# Patient Record
Sex: FEMALE | Race: WHITE | NOT HISPANIC OR LATINO | Employment: FULL TIME | URBAN - METROPOLITAN AREA
[De-identification: names, ages, dates, MRNs, and addresses within clinical notes are randomized per-mention and may not be internally consistent; named-entity substitution may affect disease eponyms.]

---

## 2017-08-25 ENCOUNTER — APPOINTMENT (OUTPATIENT)
Dept: RADIOLOGY | Facility: MEDICAL CENTER | Age: 37
End: 2017-08-25
Attending: EMERGENCY MEDICINE
Payer: MEDICAID

## 2017-08-25 ENCOUNTER — HOSPITAL ENCOUNTER (EMERGENCY)
Facility: MEDICAL CENTER | Age: 37
End: 2017-08-26
Attending: EMERGENCY MEDICINE
Payer: MEDICAID

## 2017-08-25 VITALS
TEMPERATURE: 97.4 F | DIASTOLIC BLOOD PRESSURE: 73 MMHG | HEIGHT: 64 IN | SYSTOLIC BLOOD PRESSURE: 119 MMHG | HEART RATE: 71 BPM | BODY MASS INDEX: 35 KG/M2 | OXYGEN SATURATION: 96 % | RESPIRATION RATE: 16 BRPM | WEIGHT: 205.03 LBS

## 2017-08-25 DIAGNOSIS — R10.13 EPIGASTRIC PAIN: ICD-10-CM

## 2017-08-25 LAB
ALBUMIN SERPL BCP-MCNC: 3.9 G/DL (ref 3.2–4.9)
ALBUMIN/GLOB SERPL: 1.3 G/DL
ALP SERPL-CCNC: 70 U/L (ref 30–99)
ALT SERPL-CCNC: 39 U/L (ref 2–50)
ANION GAP SERPL CALC-SCNC: 6 MMOL/L (ref 0–11.9)
AST SERPL-CCNC: 35 U/L (ref 12–45)
BASOPHILS # BLD AUTO: 0.2 % (ref 0–1.8)
BASOPHILS # BLD: 0.02 K/UL (ref 0–0.12)
BILIRUB SERPL-MCNC: 0.8 MG/DL (ref 0.1–1.5)
BUN SERPL-MCNC: 10 MG/DL (ref 8–22)
CALCIUM SERPL-MCNC: 9 MG/DL (ref 8.4–10.2)
CHLORIDE SERPL-SCNC: 103 MMOL/L (ref 96–112)
CO2 SERPL-SCNC: 26 MMOL/L (ref 20–33)
CREAT SERPL-MCNC: 0.67 MG/DL (ref 0.5–1.4)
EOSINOPHIL # BLD AUTO: 0.18 K/UL (ref 0–0.51)
EOSINOPHIL NFR BLD: 1.7 % (ref 0–6.9)
ERYTHROCYTE [DISTWIDTH] IN BLOOD BY AUTOMATED COUNT: 43.7 FL (ref 35.9–50)
GFR SERPL CREATININE-BSD FRML MDRD: >60 ML/MIN/1.73 M 2
GLOBULIN SER CALC-MCNC: 3.1 G/DL (ref 1.9–3.5)
GLUCOSE SERPL-MCNC: 87 MG/DL (ref 65–99)
HCT VFR BLD AUTO: 39.7 % (ref 37–47)
HGB BLD-MCNC: 13.3 G/DL (ref 12–16)
IMM GRANULOCYTES # BLD AUTO: 0.06 K/UL (ref 0–0.11)
IMM GRANULOCYTES NFR BLD AUTO: 0.6 % (ref 0–0.9)
LIPASE SERPL-CCNC: 29 U/L (ref 7–58)
LYMPHOCYTES # BLD AUTO: 3.6 K/UL (ref 1–4.8)
LYMPHOCYTES NFR BLD: 33.2 % (ref 22–41)
MCH RBC QN AUTO: 29.5 PG (ref 27–33)
MCHC RBC AUTO-ENTMCNC: 33.5 G/DL (ref 33.6–35)
MCV RBC AUTO: 88 FL (ref 81.4–97.8)
MONOCYTES # BLD AUTO: 0.87 K/UL (ref 0–0.85)
MONOCYTES NFR BLD AUTO: 8 % (ref 0–13.4)
NEUTROPHILS # BLD AUTO: 6.12 K/UL (ref 2–7.15)
NEUTROPHILS NFR BLD: 56.3 % (ref 44–72)
NRBC # BLD AUTO: 0 K/UL
NRBC BLD AUTO-RTO: 0 /100 WBC
PLATELET # BLD AUTO: 208 K/UL (ref 164–446)
PMV BLD AUTO: 10.4 FL (ref 9–12.9)
POTASSIUM SERPL-SCNC: 3.6 MMOL/L (ref 3.6–5.5)
PROT SERPL-MCNC: 7 G/DL (ref 6–8.2)
RBC # BLD AUTO: 4.51 M/UL (ref 4.2–5.4)
SODIUM SERPL-SCNC: 135 MMOL/L (ref 135–145)
WBC # BLD AUTO: 10.9 K/UL (ref 4.8–10.8)

## 2017-08-25 PROCEDURE — 85025 COMPLETE CBC W/AUTO DIFF WBC: CPT

## 2017-08-25 PROCEDURE — 99283 EMERGENCY DEPT VISIT LOW MDM: CPT

## 2017-08-25 PROCEDURE — 83690 ASSAY OF LIPASE: CPT

## 2017-08-25 PROCEDURE — 80053 COMPREHEN METABOLIC PANEL: CPT

## 2017-08-25 PROCEDURE — 81025 URINE PREGNANCY TEST: CPT

## 2017-08-25 PROCEDURE — 36415 COLL VENOUS BLD VENIPUNCTURE: CPT

## 2017-08-25 NOTE — ED AVS SNAPSHOT
Tomorrow Access Code: [email or web address]  Expires: 9/25/2017 12:16 AM    Your email address is not on file at [facility].  Email Addresses are required for you to sign up for Tomorrow, please contact [phone] to verify your personal information and to provide your email address prior to attempting to register for Tomorrow.    Terri Alvares  [address]   [place] [address]    Tomorrow  A secure, online tool to manage your health information     [facility]s Tomorrow® is a secure, online tool that connects you to your personalized health information from the privacy of your home -- day or night - making it very easy for you to manage your healthcare. Once the activation process is completed, you can even access your medical information using the Tomorrow concha, which is available for free in the Apple Concha store or Google Play store.     To learn more about Tomorrow, visit [email or web address]    There are two levels of access available (as shown below):   My Chart Features  [facility] Primary Care Doctor [facility]  Specialists [facility]  Urgent  Care Non-[facility] Primary Care Doctor   Email your healthcare team securely and privately 24/7 X X X    Manage appointments: schedule your next appointment; view details of past/upcoming appointments X      Request prescription refills. X      View recent personal medical records, including lab and immunizations X X X X   View health record, including health history, allergies, medications X X X X   Read reports about your outpatient visits, procedures, consult and ER notes X X X X   See your discharge summary, which is a recap of your hospital and/or ER visit that includes your diagnosis, lab results, and care plan X X  X     How to register for [employer]Me[employer]:  Once your e-mail address has been verified, follow the following steps to sign up for Tomorrow.     1. Go to  [email or web address]  2. Click on the Sign Up Now box, which takes you to the New Member Sign Up page. You will need  to provide the following information:  a. Enter your mySBX Access Code exactly as it appears at the top of this page. (You will not need to use this code after you’ve completed the sign-up process. If you do not sign up before the expiration date, you must request a new code.)   b. Enter your date of birth.   c. Enter your home email address.   d. Click Submit, and follow the next screen’s instructions.  3. Create a mySBX ID. This will be your mySBX login ID and cannot be changed, so think of one that is secure and easy to remember.  4. Create a mySBX password. You can change your password at any time.  5. Enter your Password Reset Question and Answer. This can be used at a later time if you forget your password.   6. Enter your e-mail address. This allows you to receive e-mail notifications when new information is available in mySBX.  7. Click Sign Up. You can now view your health information.    For assistance activating your mySBX account, call [phone]

## 2017-08-25 NOTE — ED AVS SNAPSHOT
Home Care Instructions                                                                                                                Terri Alvares   MRN: [id number]    Department:  [facility], Emergency Dept   Date of Visit:  8/25/2017            [facility], Emergency Dept    [address] [place] [address]    Phone:  [phone]      You were seen by     Edwin Qiu M.D.      Your Diagnosis Was     Epigastric pain     R10.13       Follow-up Information     1. Follow up with Missy Eckert M.D. In 1 week.    Specialty:  Family Medicine    Contact information    [address]   [place] [address] [phone]        Medication Information     Review all of your home medications and newly ordered medications with your primary doctor and/or pharmacist as soon as possible. Follow medication instructions as directed by your doctor and/or pharmacist.     Please keep your complete medication list with you and share with your physician. Update the information when medications are discontinued, doses are changed, or new medications (including over-the-counter products) are added; and carry medication information at all times in the event of emergency situations.               Medication List      ASK your doctor about these medications        Instructions    Morning Afternoon Evening Bedtime    acyclovir 400 MG tablet   Commonly known as:  ZOVIRAX        Take 1 Tab by mouth 2 times a day.   Dose:  400 mg                        ibuprofen 800 MG Tabs   Commonly known as:  MOTRIN        Take 1 Tab by mouth every 8 hours as needed (Cramping).   Dose:  800 mg                        oxycodone-acetaminophen 5-325 MG Tabs   Commonly known as:  PERCOCET        Take 2 Tabs by mouth every four hours as needed for Severe Pain ((Pain Scale 7-10)).   Dose:  2 Tab                        PRENATAL VIT-FE BISG-FA-DHA PO        Take  by mouth.                                   Procedures and tests performed during your visit     CBC WITH DIFFERENTIAL    COMP METABOLIC PANEL    CONSENT FOR CONTRAST INJECTION    CT-ABDOMEN-PELVIS WITH    ESTIMATED GFR    LIPASE        Discharge Instructions       Your CT shows no problems with your left and/or other surgical process. Please follow-up with your doctor as below, seek immediate medical attention for any vomiting, fevers, new or worsening pain or any other concerns    Abdominal Pain (Nonspecific)  Your exam might not show the exact reason you have abdominal pain. Since there are many different causes of abdominal pain, another checkup and more tests may be needed. It is very important to follow up for lasting (persistent) or worsening symptoms. A possible cause of abdominal pain in any person who still has his or her appendix is acute appendicitis. Appendicitis is often hard to diagnose. Normal blood tests, urine tests, ultrasound, and CT scans do not completely rule out early appendicitis or other causes of abdominal pain. Sometimes, only the changes that happen over time will allow appendicitis and other causes of abdominal pain to be determined. Other potential problems that may require surgery may also take time to become more apparent. Because of this, it is important that you follow all of the instructions below.  HOME CARE INSTRUCTIONS   · Rest as much as possible.   · Do not eat solid food until your pain is gone.   · While adults or children have pain: A diet of water, weak decaffeinated tea, broth or bouillon, gelatin, oral rehydration solutions (ORS), frozen ice pops, or ice chips may be helpful.   · When pain is gone in adults or children: Start a light diet (dry toast, crackers, applesauce, or white rice). Increase the diet slowly as long as it does not bother you. Eat no dairy products (including cheese and eggs) and no spicy, fatty, fried, or high-fiber foods.   · Use no alcohol, caffeine, or cigarettes.   · Take your regular  medicines unless your caregiver told you not to.   · Take any prescribed medicine as directed.   · Only take over-the-counter or prescription medicines for pain, discomfort, or fever as directed by your caregiver. Do not give aspirin to children.   If your caregiver has given you a follow-up appointment, it is very important to keep that appointment. Not keeping the appointment could result in a permanent injury and/or lasting (chronic) pain and/or disability. If there is any problem keeping the appointment, you must call to reschedule.   SEEK IMMEDIATE MEDICAL CARE IF:   · Your pain is not gone in 24 hours.   · Your pain becomes worse, changes location, or feels different.   · You or your child has an oral temperature above 102° F (38.9° C), not controlled by medicine.   · Your baby is older than 3 months with a rectal temperature of 102° F (38.9° C) or higher.   · Your baby is 3 months old or younger with a rectal temperature of 100.4° F (38° C) or higher.   · You have shaking chills.   · You keep throwing up (vomiting) or cannot drink liquids.   · There is blood in your vomit or you see blood in your bowel movements.   · Your bowel movements become dark or black.   · You have frequent bowel movements.   · Your bowel movements stop (become blocked) or you cannot pass gas.   · You have bloody, frequent, or painful urination.   · You have yellow discoloration in the skin or whites of the eyes.   · Your stomach becomes bloated or bigger.   · You have dizziness or fainting.   · You have chest or back pain.   MAKE SURE YOU:   · Understand these instructions.   · Will watch your condition.   · Will get help right away if you are not doing well or get worse.   Document Released: 12/18/2006 Document Revised: 03/11/2013 Document Reviewed: 11/15/2010  ExitCare® Patient Information ©2013 [employer].          Patient Information     Patient Information    Following emergency treatment: all patient requiring follow-up care  must return either to a private physician or a clinic if your condition worsens before you are able to obtain further medical attention, please return to the emergency room.     Billing Information    At [facility], we work to make the billing process streamlined for our patients.  Our Representatives are here to answer any questions you may have regarding your hospital bill.  If you have insurance coverage and have supplied your insurance information to us, we will submit a claim to your insurer on your behalf.  Should you have any questions regarding your bill, we can be reached online or by phone as follows:  Online: You are able pay your bills online or live chat with our representatives about any billing questions you may have. We are here to help Monday - Friday from 8:00am to 7:30pm and 9:00am - 12:00pm on Saturdays.  Please visit [email or web address]  for more information.   Phone:  [phone] or [phone]    Please note that your emergency physician, surgeon, pathologist, radiologist, anesthesiologist, and other specialists are not employed by [facility] and will therefore bill separately for their services.  Please contact them directly for any questions concerning their bills at the numbers below:     Emergency Physician Services:  [phone]  [facility]:  [phone]  [facility]:  [phone]  [facility]:  [phone]    1. Your final bill may vary from the amount quoted upon discharge if all procedures are not complete at that time, or if your doctor has additional procedures of which we are not aware. You will receive an additional bill if you return to the Emergency Department at [facility] for suture removal regardless of the facility of which the sutures were placed.     2. Please arrange for settlement of this account at the emergency registration.    3. All self-pay accounts are due in full at the  time of treatment.  If you are unable to meet this obligation then payment is expected within 4-5 days.     4. If you have had radiology studies (CT, X-ray, Ultrasound, MRI), you have received a preliminary result during your emergency department visit. Please contact the radiology department [phone] to receive a copy of your final result. Please discuss the Final result with your primary physician or with the follow up physician provided.     Crisis Hotline:  [facility] Hotline:  7-139-LRDSXTX or 1-584.357.9145  [place] Crisis Hotline:    1-590.516.3574 or 381-078-8617         ED Discharge Follow Up Questions    1. In order to provide you with very good care, we would like to follow up with a phone call in the next few days.  May we have your permission to contact you?     YES /  NO    2. What is the best phone number to call you? (       )_____-__________    3. What is the best time to call you?      Morning  /  Afternoon  /  Evening                   Patient Signature:  ____________________________________________________________    Date:  ____________________________________________________________

## 2017-08-25 NOTE — ED AVS SNAPSHOT
8/26/2017    Terri Alvares  [address]   [place] [address]    Dear Terri:    [facility] wants to ensure your discharge home is safe and you or your loved ones have had all of your questions answered regarding your care after you leave the hospital.    Below is a list of resources and contact information should you have any questions regarding your hospital stay, follow-up instructions, or active medical symptoms.    Questions or Concerns Regarding… Contact   Medical Questions Related to Your Discharge  (7 days a week, 8am-5pm) Contact a Nurse Care Coordinator   [phone]   Medical Questions Not Related to Your Discharge  (24 hours a day / 7 days a week)  Contact the Nurse Health Line   [phone]    Medications or Discharge Instructions Refer to your discharge packet   or contact your [facility] Primary Care Provider   [phone]   Follow-up Appointment(s) Schedule your appointment via [employer]   or contact Scheduling [phone]   Billing Review your statement via [employer]  or contact Billing [phone]   Medical Records Review your records via [employer]   or contact Medical Records [phone]     You may receive a telephone call within two days of discharge. This call is to make certain you understand your discharge instructions and have the opportunity to have any questions answered. You can also easily access your medical information, test results and upcoming appointments via the MagForce free online health management tool. You can learn more and sign up at [email or web address]/MagForce. For assistance setting up your MagForce account, please call [phone].    Once again, we want to ensure your discharge home is safe and that you have a clear understanding of any next steps in your care. If you have any questions or concerns, please do not hesitate to contact us, we are here for you. Thank you for choosing [facility] for your healthcare needs.    Sincerely,    Your [facility] Healthcare Team

## 2017-08-26 LAB — HCG UR QL: NEGATIVE

## 2017-08-26 PROCEDURE — 700117 HCHG RX CONTRAST REV CODE 255: Performed by: EMERGENCY MEDICINE

## 2017-08-26 PROCEDURE — 74177 CT ABD & PELVIS W/CONTRAST: CPT

## 2017-08-26 RX ADMIN — IOHEXOL 100 ML: 350 INJECTION, SOLUTION INTRAVENOUS at 00:20

## 2017-08-26 NOTE — ED PROVIDER NOTES
ED Provider Note    ER PROVIDER NOTE    Scribed for Edwin Qiu M.D.  by Edwin Qiu. 8/25/2017 at 11:03 PM.    Primary Care Provider: Missy Eckert M.D.  Means of Arrival: pt  History obtained from: pt   History limited by: none     CHIEF COMPLAINT  Chief Complaint   Patient presents with   • Abdominal Pain       HPI  Terri Alvares is a 37 y.o. female who presents to the emergency department complaining ofUpper abdominal pain. Patient had lap band 4 years ago and has been doing well. She had some felt sick and outs around 1 month ago and has had some intermittent pain since then. She denies any nausea or vomiting. Pain just seems more random and crampy in nature to her without alleviating or aggravating factors. She's had some constipation that is still having bowel movements. No blood. No fevers or chills. No urinary symptoms. No chest pain or other symptoms    REVIEW OF SYSTEMS  Pertinent positives include abdominal pain. Pertinent negatives include no vomiting. See HPI for details. All other systems reviewed and are negative.    PAST MEDICAL HISTORY   has a past medical history of Allergy; Depression; GDM (gestational diabetes mellitus) (7/3/2013); Psychiatric disorder; and Suicidal attempted.    SURGICAL HISTORY   has a past surgical history that includes eye surgery (2011) and gastric banding laparoscopic (12/12).    FAMILY HISTORY  Family History   Problem Relation Age of Onset   • Diabetes Mother      dialysis   • Diabetes Brother    • Diabetes Maternal Aunt    • Diabetes Maternal Grandmother        SOCIAL HISTORY  Social History     Social History   • Marital status: Single     Spouse name: N/A   • Number of children: N/A   • Years of education: N/A     Social History Main Topics   • Smoking status: Former Smoker     Types: Cigarettes     Quit date: 11/12/2013   • Smokeless tobacco: Not on file      Comment: last used 3/13 due to pregnancy   • Alcohol use No      Comment: occ    • Drug use:  "No   • Sexual activity: Not Currently     Partners: Male      Comment: unplanned pregnancy FOB not involved     Other Topics Concern   • Not on file     Social History Narrative   • No narrative on file      History   Drug Use No       CURRENT MEDICATIONS  Home Medications    **Home medications have not yet been reviewed for this encounter**         ALLERGIES  No Known Allergies    PHYSICAL EXAM  VITAL SIGNS: /73   Pulse 71   Temp 36.3 °C (97.4 °F)   Resp 16   Ht 1.626 m (5' 4.02\")   Wt 93 kg (205 lb 0.4 oz)   LMP 07/01/2015 Comment: Irregular periods, +UPT in Aug, no bleeding since  SpO2 96%   BMI 35.18 kg/m²   Pulse ox interpretation: I interpret this pulse ox as normal.    Constitutional: Alert in no apparent distress.  HENT: No signs of trauma, Bilateral external ears normal, Nose normal.   Eyes: Pupils are equal and reactive, Conjunctiva normal, Non-icteric.   Neck: Normal range of motion, No tenderness, Supple, No stridor.   Lymphatic: No lymphadenopathy noted.   Cardiovascular: Regular rate and rhythm, no murmurs.   Thorax & Lungs: Normal breath sounds, No respiratory distress, No wheezing, No chest tenderness.   Abdomen: Bowel sounds normal, Soft, No tenderness, No masses, No pulsatile masses. No peritoneal signs.  Skin: Warm, Dry, No erythema, No rash.   Back: No bony tenderness, No CVA tenderness.   Extremities: Intact distal pulses, No edema, No tenderness, No cyanosis, Negative Alla's sign.  Musculoskeletal: Good range of motion in all major joints. No tenderness to palpation or major deformities noted.   Neurologic: Alert , Normal motor function, Normal sensory function, No focal deficits noted.   Psychiatric: Affect normal, Judgment normal, Mood normal.     DIAGNOSTIC STUDIES / PROCEDURES        LABS  Results for orders placed or performed during the hospital encounter of 08/25/17   CBC WITH DIFFERENTIAL   Result Value Ref Range    WBC 10.9 (H) 4.8 - 10.8 K/uL    RBC 4.51 4.20 - 5.40 " M/uL    Hemoglobin 13.3 12.0 - 16.0 g/dL    Hematocrit 39.7 37.0 - 47.0 %    MCV 88.0 81.4 - 97.8 fL    MCH 29.5 27.0 - 33.0 pg    MCHC 33.5 (L) 33.6 - 35.0 g/dL    RDW 43.7 35.9 - 50.0 fL    Platelet Count 208 164 - 446 K/uL    MPV 10.4 9.0 - 12.9 fL    Neutrophils-Polys 56.30 44.00 - 72.00 %    Lymphocytes 33.20 22.00 - 41.00 %    Monocytes 8.00 0.00 - 13.40 %    Eosinophils 1.70 0.00 - 6.90 %    Basophils 0.20 0.00 - 1.80 %    Immature Granulocytes 0.60 0.00 - 0.90 %    Nucleated RBC 0.00 /100 WBC    Neutrophils (Absolute) 6.12 2.00 - 7.15 K/uL    Lymphs (Absolute) 3.60 1.00 - 4.80 K/uL    Monos (Absolute) 0.87 (H) 0.00 - 0.85 K/uL    Eos (Absolute) 0.18 0.00 - 0.51 K/uL    Baso (Absolute) 0.02 0.00 - 0.12 K/uL    Immature Granulocytes (abs) 0.06 0.00 - 0.11 K/uL    NRBC (Absolute) 0.00 K/uL   COMP METABOLIC PANEL   Result Value Ref Range    Sodium 135 135 - 145 mmol/L    Potassium 3.6 3.6 - 5.5 mmol/L    Chloride 103 96 - 112 mmol/L    Co2 26 20 - 33 mmol/L    Anion Gap 6.0 0.0 - 11.9    Glucose 87 65 - 99 mg/dL    Bun 10 8 - 22 mg/dL    Creatinine 0.67 0.50 - 1.40 mg/dL    Calcium 9.0 8.4 - 10.2 mg/dL    AST(SGOT) 35 12 - 45 U/L    ALT(SGPT) 39 2 - 50 U/L    Alkaline Phosphatase 70 30 - 99 U/L    Total Bilirubin 0.8 0.1 - 1.5 mg/dL    Albumin 3.9 3.2 - 4.9 g/dL    Total Protein 7.0 6.0 - 8.2 g/dL    Globulin 3.1 1.9 - 3.5 g/dL    A-G Ratio 1.3 g/dL   LIPASE   Result Value Ref Range    Lipase 29 7 - 58 U/L   ESTIMATED GFR   Result Value Ref Range    GFR If African American >60 >60 mL/min/1.73 m 2    GFR If Non African American >60 >60 mL/min/1.73 m 2   POC URINE PREGNANCY   Result Value Ref Range    POC Urine Pregnancy Test Negative        All labs reviewed by me.    RADIOLOGY  CT-ABDOMEN-PELVIS WITH   Final Result         1.  No acute abnormality.   2.  Hepatomegaly and diffuse hepatic steatosis.   3.  Splenic cyst        The radiologist's interpretation of all radiological studies have been reviewed by  me.    COURSE & MEDICAL DECISION MAKING  Nursing notes, VS, PMSFHx reviewed in chart.    11:03 PM Patient seen and examined at bedside.Ordered for  CT, blood work to evaluate her symptoms.     12:17 AM  Evaluated, patient states no pain at this time, abdomen soft and nontender    Patient reevaluated still symptom free. Updated on result and plan for discharge    Decision Making:  This is a 37 y.o. female presenting with intermittent abdominal pain. At this time no surgical process is detected. The nature of her symptoms and diagnostics obtained here today do not suggest biliary pathology, or pancreatitis. This may be more gastric in nature. There does not appear to be any complication, emergently, from her lap band without evidence of obstruction, internal hernia or perforation. Patient will follow up with her surgeon for reevaluation and is well-appearing, pain-free and tolerating by mouth well at the time of discharge     The patient will return for new or worsening symptoms and is stable at the time of discharge.    The patient is referred to a primary physician for blood pressure management, diabetic screening, and for all other preventative health concerns.    DISPOSITION:  Patient will be discharged home in stable condition.    FOLLOW UP:  Missy Eckert M.D.  [address] [facility] [address]  [phone]    In 1 week        OUTPATIENT MEDICATIONS:  Discharge Medication List as of 8/26/2017 12:16 AM              FINAL IMPRESSION  1. Epigastric pain         The note accurately reflects work and decisions made by me.  Edwin Qiu  8/26/2017  4:57 AM

## 2017-08-26 NOTE — DISCHARGE INSTRUCTIONS
Your CT shows no problems with your left and/or other surgical process. Please follow-up with your doctor as below, seek immediate medical attention for any vomiting, fevers, new or worsening pain or any other concerns    Abdominal Pain (Nonspecific)  Your exam might not show the exact reason you have abdominal pain. Since there are many different causes of abdominal pain, another checkup and more tests may be needed. It is very important to follow up for lasting (persistent) or worsening symptoms. A possible cause of abdominal pain in any person who still has his or her appendix is acute appendicitis. Appendicitis is often hard to diagnose. Normal blood tests, urine tests, ultrasound, and CT scans do not completely rule out early appendicitis or other causes of abdominal pain. Sometimes, only the changes that happen over time will allow appendicitis and other causes of abdominal pain to be determined. Other potential problems that may require surgery may also take time to become more apparent. Because of this, it is important that you follow all of the instructions below.  HOME CARE INSTRUCTIONS   · Rest as much as possible.   · Do not eat solid food until your pain is gone.   · While adults or children have pain: A diet of water, weak decaffeinated tea, broth or bouillon, gelatin, oral rehydration solutions (ORS), frozen ice pops, or ice chips may be helpful.   · When pain is gone in adults or children: Start a light diet (dry toast, crackers, applesauce, or white rice). Increase the diet slowly as long as it does not bother you. Eat no dairy products (including cheese and eggs) and no spicy, fatty, fried, or high-fiber foods.   · Use no alcohol, caffeine, or cigarettes.   · Take your regular medicines unless your caregiver told you not to.   · Take any prescribed medicine as directed.   · Only take over-the-counter or prescription medicines for pain, discomfort, or fever as directed by your caregiver. Do not give  aspirin to children.   If your caregiver has given you a follow-up appointment, it is very important to keep that appointment. Not keeping the appointment could result in a permanent injury and/or lasting (chronic) pain and/or disability. If there is any problem keeping the appointment, you must call to reschedule.   SEEK IMMEDIATE MEDICAL CARE IF:   · Your pain is not gone in 24 hours.   · Your pain becomes worse, changes location, or feels different.   · You or your child has an oral temperature above 102° F (38.9° C), not controlled by medicine.   · Your baby is older than 3 months with a rectal temperature of 102° F (38.9° C) or higher.   · Your baby is 3 months old or younger with a rectal temperature of 100.4° F (38° C) or higher.   · You have shaking chills.   · You keep throwing up (vomiting) or cannot drink liquids.   · There is blood in your vomit or you see blood in your bowel movements.   · Your bowel movements become dark or black.   · You have frequent bowel movements.   · Your bowel movements stop (become blocked) or you cannot pass gas.   · You have bloody, frequent, or painful urination.   · You have yellow discoloration in the skin or whites of the eyes.   · Your stomach becomes bloated or bigger.   · You have dizziness or fainting.   · You have chest or back pain.   MAKE SURE YOU:   · Understand these instructions.   · Will watch your condition.   · Will get help right away if you are not doing well or get worse.   Document Released: 12/18/2006 Document Revised: 03/11/2013 Document Reviewed: 11/15/2010  [employer]® Patient Information ©2013 [employer], "[employer].

## 2017-08-26 NOTE — ED NOTES
"C/o pain in upper abdomen. Pt states she thinks this is secondary to her lap band. Pt states she had the surgery \"a couple years ago\" and that she has had issues before. Wants an x-ray to confirm that the lap band has not moved.  "

## 2017-10-16 ENCOUNTER — NON-PROVIDER VISIT (OUTPATIENT)
Dept: OCCUPATIONAL MEDICINE | Facility: CLINIC | Age: 37
End: 2017-10-16

## 2017-10-16 DIAGNOSIS — Z02.1 PRE-EMPLOYMENT DRUG SCREENING: ICD-10-CM

## 2017-10-16 LAB
AMP AMPHETAMINE: NORMAL
COC COCAINE: NORMAL
INT CON NEG: NORMAL
INT CON POS: NORMAL
MET METHAMPHETAMINES: NORMAL
OPI OPIATES: NORMAL
PCP PHENCYCLIDINE: NORMAL
POC DRUG COMMENT 753798-OCCUPATIONAL HEALTH: NORMAL
THC: NORMAL

## 2017-10-16 PROCEDURE — 80305 DRUG TEST PRSMV DIR OPT OBS: CPT | Performed by: PREVENTIVE MEDICINE

## 2018-05-02 ENCOUNTER — INITIAL PRENATAL (OUTPATIENT)
Dept: OBGYN | Facility: CLINIC | Age: 38
End: 2018-05-02
Payer: MEDICAID

## 2018-05-02 VITALS — WEIGHT: 207 LBS | BODY MASS INDEX: 35.51 KG/M2 | DIASTOLIC BLOOD PRESSURE: 72 MMHG | SYSTOLIC BLOOD PRESSURE: 108 MMHG

## 2018-05-02 DIAGNOSIS — N93.8 DUB (DYSFUNCTIONAL UTERINE BLEEDING): ICD-10-CM

## 2018-05-02 LAB
INT CON NEG: NEGATIVE
INT CON POS: POSITIVE
POC URINE PREGNANCY TEST: POSITIVE

## 2018-05-02 PROCEDURE — 81025 URINE PREGNANCY TEST: CPT | Performed by: OBSTETRICS & GYNECOLOGY

## 2018-05-02 PROCEDURE — 99213 OFFICE O/P EST LOW 20 MIN: CPT | Mod: 25 | Performed by: OBSTETRICS & GYNECOLOGY

## 2018-05-02 PROCEDURE — 76830 TRANSVAGINAL US NON-OB: CPT | Performed by: OBSTETRICS & GYNECOLOGY

## 2018-05-02 NOTE — PROGRESS NOTES
Patient here for a   LMP: beginning of February 2018  PNV patient started taking vitamins when she found out she was pregnant   Phone #:[phone]  Pharmacy Confirmed w/Pt.  C/O: Yeast infection, tried OTC, but states this has not resolved.

## 2018-05-02 NOTE — PROGRESS NOTES
Chief complaint;  This patient is a 37 y.o. female , No LMP recorded. Patient is pregnant. presents complaining of dysfunctional uterine bleeding.    Review of systems-denies; chest pain, shortness of breath, fever, chills, abdominal pain  Past OB history-  OB History    Para Term  AB Living   4 2 2   1 2   SAB TAB Ectopic Molar Multiple Live Births     0     0 2      # Outcome Date GA Lbr Frank/2nd Weight Sex Delivery Anes PTL Lv   4 Current            3 Term 04/10/16 41w0d  2.722 kg (6 lb) F Vag-Spont EPI N ROWAN      Birth Comments: 1 day procedure   2 Term 13 39w0d  3.135 kg (6 lb 14.6 oz) F VAGINAL TREY  N ROWAN   1 AB 98              Birth Comments: System Generated. Please review and update pregnancy details.        Past surgical history-   Past Surgical History:   Procedure Laterality Date   • GASTRIC BANDING LAPAROSCOPIC      Lost 40#   • EYE SURGERY       Past medical history-   Past Medical History:   Diagnosis Date   • Allergy     seasonal   • Depression    • GDM (gestational diabetes mellitus) 7/3/2013    denies, states treated r/t previous gastric surgery. NOT current diabetic   • Psychiatric disorder    • Suicidal attempted      Allergies- Patient has no known allergies.  Present medications-   Outpatient Encounter Prescriptions as of 2018   Medication Sig Dispense Refill   • PRENATAL VIT-FE BISG-FA-DHA PO Take  by mouth.     • oxycodone-acetaminophen (PERCOCET) 5-325 MG Tab Take 2 Tabs by mouth every four hours as needed for Severe Pain ((Pain Scale 7-10)). (Patient not taking: Reported on 2018) 15 Tab 0   • ibuprofen (MOTRIN) 800 MG Tab Take 1 Tab by mouth every 8 hours as needed (Cramping). (Patient not taking: Reported on 2018) 30 Tab 3   • acyclovir (ZOVIRAX) 400 MG tablet Take 1 Tab by mouth 2 times a day. (Patient not taking: Reported on 2018) 60 Tab 6     No facility-administered encounter medications on file as of 2018.      Family  history- no history of breast or ovarian cancer  Social history-   Social History     Social History   • Marital status: Single     Spouse name: N/A   • Number of children: N/A   • Years of education: N/A     Occupational History   • Not on file.     Social History Main Topics   • Smoking status: Former Smoker     Types: Cigarettes     Quit date: 11/12/2013   • Smokeless tobacco: Never Used      Comment: last used 3/13 due to pregnancy   • Alcohol use No      Comment: occ    • Drug use: No   • Sexual activity: Yes     Partners: Male     Other Topics Concern   • Not on file     Social History Narrative   • No narrative on file         Physical examination;   Alert and oriented x3  General-well-developed well-nourished female in no apparent distress  Vitals:    05/02/18 1322   BP: 108/72   Weight: 93.9 kg (207 lb)     Skin is warm and dry   HEENT-normocephalic,nontraumatic,PERRLA,EOMI  Throat- no edema or erythema  Neck-supple  Cardiovascular-regular rate and rhythm, normal S1-S2 without murmurs or gallops  Lungs-clear to auscultation bilaterally  Back-negative CVA tenderness  Abdomen-nondistended positive bowel sounds soft nontender without masses or hepatosplenomegaly  Pelvic examination;  External genitalia-without lesions  Vagina-no blood, no discharge  Cervix-closed,no gross lesions  Uterus-  12 weeks size, nontender  Adnexa- without mass or tenderness  Extremities-without cyanosis clubbing or edema  Neurologic-grossly intact    Transvaginal ultrasound; as performed and read by myself; intrauterine gestational sac containing small pregnancy positive fetal cardiac motion crown-rump length consistent with 12 weeks 0 days, EDC 11/14/18 no free pelvic fluid, no adnexal masses    Impression;  Dysfunctional uterine bleeding-no evidence of ectopic or miscarriage    Plan;     Patient to followup 2 weeks for initial OB

## 2018-06-05 ENCOUNTER — HOSPITAL ENCOUNTER (EMERGENCY)
Facility: MEDICAL CENTER | Age: 38
End: 2018-06-06
Attending: EMERGENCY MEDICINE
Payer: MEDICAID

## 2018-06-05 ENCOUNTER — APPOINTMENT (OUTPATIENT)
Dept: RADIOLOGY | Facility: MEDICAL CENTER | Age: 38
End: 2018-06-05
Attending: EMERGENCY MEDICINE
Payer: MEDICAID

## 2018-06-05 DIAGNOSIS — R10.9 ABDOMINAL PAIN DURING PREGNANCY IN SECOND TRIMESTER: ICD-10-CM

## 2018-06-05 DIAGNOSIS — O26.892 ABDOMINAL PAIN DURING PREGNANCY IN SECOND TRIMESTER: ICD-10-CM

## 2018-06-05 LAB
ALBUMIN SERPL BCP-MCNC: 3.6 G/DL (ref 3.2–4.9)
ALBUMIN/GLOB SERPL: 1.1 G/DL
ALP SERPL-CCNC: 46 U/L (ref 30–99)
ALT SERPL-CCNC: 41 U/L (ref 2–50)
ANION GAP SERPL CALC-SCNC: 9 MMOL/L (ref 0–11.9)
AST SERPL-CCNC: 52 U/L (ref 12–45)
BASOPHILS # BLD AUTO: 0.1 % (ref 0–1.8)
BASOPHILS # BLD: 0.01 K/UL (ref 0–0.12)
BILIRUB SERPL-MCNC: 0.4 MG/DL (ref 0.1–1.5)
BUN SERPL-MCNC: 8 MG/DL (ref 8–22)
CALCIUM SERPL-MCNC: 8.9 MG/DL (ref 8.4–10.2)
CHLORIDE SERPL-SCNC: 108 MMOL/L (ref 96–112)
CO2 SERPL-SCNC: 19 MMOL/L (ref 20–33)
CREAT SERPL-MCNC: 0.59 MG/DL (ref 0.5–1.4)
EOSINOPHIL # BLD AUTO: 0.14 K/UL (ref 0–0.51)
EOSINOPHIL NFR BLD: 1.4 % (ref 0–6.9)
ERYTHROCYTE [DISTWIDTH] IN BLOOD BY AUTOMATED COUNT: 46.4 FL (ref 35.9–50)
GLOBULIN SER CALC-MCNC: 3.3 G/DL (ref 1.9–3.5)
GLUCOSE SERPL-MCNC: 106 MG/DL (ref 65–99)
HCT VFR BLD AUTO: 37.4 % (ref 37–47)
HGB BLD-MCNC: 12.4 G/DL (ref 12–16)
IMM GRANULOCYTES # BLD AUTO: 0.14 K/UL (ref 0–0.11)
IMM GRANULOCYTES NFR BLD AUTO: 1.4 % (ref 0–0.9)
LIPASE SERPL-CCNC: 25 U/L (ref 7–58)
LYMPHOCYTES # BLD AUTO: 2.49 K/UL (ref 1–4.8)
LYMPHOCYTES NFR BLD: 25.6 % (ref 22–41)
MCH RBC QN AUTO: 30.5 PG (ref 27–33)
MCHC RBC AUTO-ENTMCNC: 33.2 G/DL (ref 33.6–35)
MCV RBC AUTO: 91.9 FL (ref 81.4–97.8)
MONOCYTES # BLD AUTO: 0.54 K/UL (ref 0–0.85)
MONOCYTES NFR BLD AUTO: 5.5 % (ref 0–13.4)
NEUTROPHILS # BLD AUTO: 6.42 K/UL (ref 2–7.15)
NEUTROPHILS NFR BLD: 66 % (ref 44–72)
NRBC # BLD AUTO: 0 K/UL
NRBC BLD-RTO: 0 /100 WBC
PLATELET # BLD AUTO: 179 K/UL (ref 164–446)
PMV BLD AUTO: 10.5 FL (ref 9–12.9)
POTASSIUM SERPL-SCNC: 3.1 MMOL/L (ref 3.6–5.5)
PROT SERPL-MCNC: 6.9 G/DL (ref 6–8.2)
RBC # BLD AUTO: 4.07 M/UL (ref 4.2–5.4)
SODIUM SERPL-SCNC: 136 MMOL/L (ref 135–145)
WBC # BLD AUTO: 9.7 K/UL (ref 4.8–10.8)

## 2018-06-05 PROCEDURE — 80053 COMPREHEN METABOLIC PANEL: CPT

## 2018-06-05 PROCEDURE — 76705 ECHO EXAM OF ABDOMEN: CPT

## 2018-06-05 PROCEDURE — 36415 COLL VENOUS BLD VENIPUNCTURE: CPT

## 2018-06-05 PROCEDURE — 99285 EMERGENCY DEPT VISIT HI MDM: CPT

## 2018-06-05 PROCEDURE — A9270 NON-COVERED ITEM OR SERVICE: HCPCS | Performed by: EMERGENCY MEDICINE

## 2018-06-05 PROCEDURE — 85025 COMPLETE CBC W/AUTO DIFF WBC: CPT

## 2018-06-05 PROCEDURE — 700102 HCHG RX REV CODE 250 W/ 637 OVERRIDE(OP): Performed by: EMERGENCY MEDICINE

## 2018-06-05 PROCEDURE — 83690 ASSAY OF LIPASE: CPT

## 2018-06-05 RX ORDER — POTASSIUM CHLORIDE 20 MEQ/1
40 TABLET, EXTENDED RELEASE ORAL ONCE
Status: COMPLETED | OUTPATIENT
Start: 2018-06-05 | End: 2018-06-05

## 2018-06-05 RX ADMIN — POTASSIUM CHLORIDE 40 MEQ: 1500 TABLET, EXTENDED RELEASE ORAL at 22:42

## 2018-06-05 ASSESSMENT — PAIN SCALES - GENERAL
PAINLEVEL_OUTOF10: 7
PAINLEVEL_OUTOF10: 7

## 2018-06-06 VITALS
HEART RATE: 74 BPM | SYSTOLIC BLOOD PRESSURE: 114 MMHG | RESPIRATION RATE: 18 BRPM | TEMPERATURE: 98.2 F | OXYGEN SATURATION: 97 % | WEIGHT: 212.08 LBS | HEIGHT: 64 IN | DIASTOLIC BLOOD PRESSURE: 74 MMHG | BODY MASS INDEX: 36.21 KG/M2

## 2018-06-06 NOTE — ED NOTES
ANTICOAGULATION FOLLOW-UP CLINIC VISIT    Patient Name:  Salome Saeed  Date:  2/8/2018  Contact Type:  Face to Face    SUBJECTIVE:     Patient Findings     Positives Change in diet/appetite, Unexplained INR or factor level change           OBJECTIVE    INR Protime   Date Value Ref Range Status   02/08/2018 3.8 (A) 0.86 - 1.14 Final       ASSESSMENT / PLAN  INR assessment SUPRA    Recheck INR In: 5 DAYS    INR Location Clinic      Anticoagulation Summary as of 2/8/2018     INR goal 2.0-3.0   Today's INR 3.8!   Maintenance plan 2.5 mg (5 mg x 0.5) on Mon, Wed, Fri; 5 mg (5 mg x 1) all other days   Full instructions 2/8: Hold; Otherwise 2.5 mg on Mon, Wed, Fri; 5 mg all other days   Weekly total 27.5 mg   Plan last modified Leyla Lopez (1/11/2016)   Next INR check 2/13/2018   Target end date     Indications   Long-term (current) use of anticoagulants [Z79.01] [Z79.01]  CVA (cerebral vascular accident) (Resolved) [I63.9]         Anticoagulation Episode Summary     INR check location     Preferred lab     Send INR reminders to [facility] TRIAGE    Comments             See the Encounter Report to view Anticoagulation Flowsheet and Dosing Calendar (Go to Encounters tab in chart review, and find the Anticoagulation Therapy Visit)    Dosage adjustment made based on physician directed care plan.    Rosanne Rico RN                Pt assessed, c/o mid epigastric pain. Denies N/V/D. 16 weeks pregnant. Call light within reach, will cont to monitor.

## 2018-06-06 NOTE — PROGRESS NOTES
Patient given DC instructions and verbalized understanding. Patient ambulated out of ED in good condition.

## 2018-06-06 NOTE — ED PROVIDER NOTES
ED Provider Note  Chief Complaint:   Right upper quadrant pain    HPI:  Terri Alvares is a 37 y.o. Female,  at 16 and 6/7 weeks by first trimester US (YEE 18) who presents with chief complaint of right upper quadrant pain. She has a past medical history significant for bariatric lap band procedure in  performed in [place]. She is currently followed by [facility]. Approximately 3-4 days ago she developed persistent right upper quadrant pain, she states this is in the area of her lap band. When she learned she was pregnant, she had the lap band decompressed. She has had some intermittent pain in the area of the right upper quadrant, however over the last 3-4 days it has become persistent and more severe. When she initially learned she was pregnant she discussed this pain with her provider at [facility] who recommended CT scan. She declined due to her pregnancy. She has not followed up with her obstetrician regarding this pain. Pain is exacerbated by eating, though not always brought on by eating. She describes it as intermittently sharp and dull. Pain is nonradiating.    She has not had any associated vomiting, she has had some mild nausea which she attributes to her pregnancy. She has had no chest pain, no shortness of breath, no fevers. She has no lower abdominal pain, no lower abdominal cramping, no abnormal vaginal discharge. She has not had any vaginal bleeding. She has no history of diabetes, no impaired immunity. No headaches, no neck or back pain.    Review of Systems:  See HPI for pertinent positives and negatives. All other systems negative.    Past Medical History:   has a past medical history of Allergy; Depression; GDM (gestational diabetes mellitus) (7/3/2013); Psychiatric disorder; and Suicidal attempted.    Social History:  Social History     Social History Main Topics   • Smoking status: Former Smoker     Types: Cigarettes     Quit date: 2013   • Smokeless  "tobacco: Never Used      Comment: last used 3/13 due to pregnancy   • Alcohol use No      Comment: occ    • Drug use: No   • Sexual activity: Yes     Partners: Male       Surgical History:   has a past surgical history that includes eye surgery (2011) and gastric banding laparoscopic (12/12).    Current Medications:  Home Medications    **Home medications have not yet been reviewed for this encounter**         Allergies:  No Known Allergies    Physical Exam:  Vital Signs: /79   Pulse 72   Temp 36.8 °C (98.2 °F)   Resp 18   Ht 1.626 m (5' 4\")   Wt 96.2 kg (212 lb 1.3 oz)   SpO2 99%   BMI 36.40 kg/m²   Constitutional: Alert, no acute distress  HENT: Moist mucus membranes, normal posterior pharynx, no intraoral lesions  Eyes: Pupils equal and reactive, normal conjunctiva  Neck: Supple, normal range of motion, no stridor  Cardiovascular: Extremities are warm and well perfused, no murmer appreciated, normal cardiac auscultation  Pulmonary: No respiratory distress, normal work of breathing, no accessory muscule usage, breath sounds clear and equal bilaterally  Abdomen: Soft, non-distended, mild right upper quadrant tenderness to palpation without rebound or guarding, negative Buckley sign, no peritoneal signs  Skin: Warm, dry, no rashes or lesions  Musculoskeletal: Normal range of motion in all extremities, no swelling or deformity noted  Neurologic: Alert, oriented, normal speech, normal motor function  Psychiatric: Normal and appropriate mood and affect    Labs:  Labs Reviewed   CBC WITH DIFFERENTIAL - Abnormal; Notable for the following:        Result Value    RBC 4.07 (*)     MCHC 33.2 (*)     Immature Granulocytes 1.40 (*)     Immature Granulocytes (abs) 0.14 (*)     All other components within normal limits   COMP METABOLIC PANEL - Abnormal; Notable for the following:     Potassium 3.1 (*)     Co2 19 (*)     Glucose 106 (*)     AST(SGOT) 52 (*)     All other components within normal limits   LIPASE "   ESTIMATED GFR       Radiology:  US-GALLBLADDER   Final Result         1. Echogenic heterogeneous liver parenchyma, most commonly hepatic steatosis.           Medical records reviewed for continuity of care. Patient seen and evaluated 8/25/17 for abdominal pain. History of lap band procedure done 4 years ago. White blood count elevated to 10.9. Potassium normal at 3.6.    Differential diagnosis:  Lap band complication, adhesions, cholecystitis, cholelithiasis, pancreatitis    MDM:  History and physical exam as documented above. Patient presents with right upper quadrant pain for the past 4 days in the setting of pregnancy. On physical exam she has negative Buckley sign, no peritoneal signs, no acute abdomen. She has no fevers, no tachycardia, no hypotension, no evidence of SIRS or sepsis by vital signs.    On laboratory evaluation white blood count is within normal limits at 9.7, no evidence of infectious etiology. Hemoglobin within normal limits. Lipase is 25, no evidence of pancreatitis. Potassium is low at 13.1, this was replaced in the emergency department. No other electrolyte abnormalities. She does have normal liver enzymes, normal total bilirubin, less concerning for obstructive biliary process.    Ultrasound of the gallbladder demonstrates heterogenous liver parenchyma, most commonly hepatic steatosis. Gallbladder is normal without wall thickening or calculi. Gallbladder wall thickness measures 0.14 cm. No pericholecystic fluid.    At this time, I suspect her pain may be related to the LAP-BAND. There is no evidence of infection, I suspect if there were perforation or other complication we would see an elevated white blood count and more concerning abdominal exam. Due to the risks involved with CT in this pregnant patient, I do believe the safest option is to have her follow up with [facility] as well as her obstetrician to continue to evaluate her abdominal pain. Return precautions given including  worsening pain, development of fevers, nausea or vomiting, inability to tolerate fluids, or any further concerns. She will contact both of these specialists tomorrow morning for follow-up appointments.    Blood pressure today is greater than 120/80, patient is instructed to follow up with primary care provider for blood pressure recheck.    Disposition:  Discharge home in stable condition    Final Impression:  1. Abdominal pain during pregnancy in second trimester        Electronically signed by: Smita Stern, 6/5/2018 10:25 PM

## 2018-06-06 NOTE — DISCHARGE INSTRUCTIONS
Please follow up with your bariatric surgeon as well as your obstetrician. Call tomorrow to schedule these appointments. Return to the emergency department if you develop new or worsening symptoms including worsening abdominal pain, fevers, redness or swelling, nausea or vomiting, inability to drink fluids, or any further concerns.        Abdominal Pain During Pregnancy  Belly (abdominal) pain is common during pregnancy. Most of the time, it is not a serious problem. Other times, it can be a sign that something is wrong with the pregnancy. Always tell your doctor if you have belly pain.  Follow these instructions at home:  Monitor your belly pain for any changes. The following actions may help you feel better:  · Do not have sex (intercourse) or put anything in your vagina until you feel better.  · Rest until your pain stops.  · Drink clear fluids if you feel sick to your stomach (nauseous). Do not eat solid food until you feel better.  · Only take medicine as told by your doctor.  · Keep all doctor visits as told.  Get help right away if:  · You are bleeding, leaking fluid, or pieces of tissue come out of your vagina.  · You have more pain or cramping.  · You keep throwing up (vomiting).  · You have pain when you pee (urinate) or have blood in your pee.  · You have a fever.  · You do not feel your baby moving as much.  · You feel very weak or feel like passing out.  · You have trouble breathing, with or without belly pain.  · You have a very bad headache and belly pain.  · You have fluid leaking from your vagina and belly pain.  · You keep having watery poop (diarrhea).  · Your belly pain does not go away after resting, or the pain gets worse.  This information is not intended to replace advice given to you by your health care provider. Make sure you discuss any questions you have with your health care provider.  Document Released: 12/06/2010 Document Revised: 07/26/2017 Document Reviewed: 07/17/2014  [employer]  Interactive Patient Education © 2017 [employer]

## 2018-06-06 NOTE — ED NOTES
Patient presents with mid epigastric pain for 4 days. Patient is 16 weeks pregnant. She denies vaginal DC or n/v.

## 2018-06-13 ENCOUNTER — HOSPITAL ENCOUNTER (OUTPATIENT)
Dept: LAB | Facility: MEDICAL CENTER | Age: 38
End: 2018-06-13
Attending: NURSE PRACTITIONER
Payer: MEDICAID

## 2018-06-13 ENCOUNTER — INITIAL PRENATAL (OUTPATIENT)
Dept: OBGYN | Facility: CLINIC | Age: 38
End: 2018-06-13
Payer: MEDICAID

## 2018-06-13 ENCOUNTER — HOSPITAL ENCOUNTER (OUTPATIENT)
Facility: MEDICAL CENTER | Age: 38
End: 2018-06-13
Attending: NURSE PRACTITIONER
Payer: MEDICAID

## 2018-06-13 VITALS
SYSTOLIC BLOOD PRESSURE: 120 MMHG | DIASTOLIC BLOOD PRESSURE: 80 MMHG | WEIGHT: 211 LBS | HEIGHT: 64 IN | BODY MASS INDEX: 36.02 KG/M2

## 2018-06-13 DIAGNOSIS — B00.9 HSV INFECTION: ICD-10-CM

## 2018-06-13 DIAGNOSIS — Z34.90 ENCOUNTER FOR SUPERVISION OF NORMAL PREGNANCY, ANTEPARTUM, UNSPECIFIED GRAVIDITY: Primary | ICD-10-CM

## 2018-06-13 DIAGNOSIS — Z34.90 ENCOUNTER FOR SUPERVISION OF NORMAL PREGNANCY, ANTEPARTUM, UNSPECIFIED GRAVIDITY: ICD-10-CM

## 2018-06-13 LAB
ABO GROUP BLD: NORMAL
APPEARANCE UR: ABNORMAL
APPEARANCE UR: NORMAL
BACTERIA #/AREA URNS HPF: NEGATIVE /HPF
BASOPHILS # BLD AUTO: 0.2 % (ref 0–1.8)
BASOPHILS # BLD: 0.02 K/UL (ref 0–0.12)
BILIRUB UR QL STRIP.AUTO: NEGATIVE
BILIRUB UR STRIP-MCNC: NORMAL MG/DL
BLD GP AB SCN SERPL QL: NORMAL
COLOR UR AUTO: NORMAL
COLOR UR: YELLOW
EOSINOPHIL # BLD AUTO: 0.14 K/UL (ref 0–0.51)
EOSINOPHIL NFR BLD: 1.3 % (ref 0–6.9)
EPI CELLS #/AREA URNS HPF: ABNORMAL /HPF
ERYTHROCYTE [DISTWIDTH] IN BLOOD BY AUTOMATED COUNT: 46.4 FL (ref 35.9–50)
GLUCOSE UR STRIP.AUTO-MCNC: NEGATIVE MG/DL
GLUCOSE UR STRIP.AUTO-MCNC: NEGATIVE MG/DL
HBV SURFACE AG SER QL: NEGATIVE
HCT VFR BLD AUTO: 38.1 % (ref 37–47)
HGB BLD-MCNC: 12.5 G/DL (ref 12–16)
HIV 1+2 AB+HIV1 P24 AG SERPL QL IA: NON REACTIVE
HYALINE CASTS #/AREA URNS LPF: ABNORMAL /LPF
IMM GRANULOCYTES # BLD AUTO: 0.18 K/UL (ref 0–0.11)
IMM GRANULOCYTES NFR BLD AUTO: 1.6 % (ref 0–0.9)
KETONES UR STRIP.AUTO-MCNC: ABNORMAL MG/DL
KETONES UR STRIP.AUTO-MCNC: NORMAL MG/DL
LEUKOCYTE ESTERASE UR QL STRIP.AUTO: ABNORMAL
LEUKOCYTE ESTERASE UR QL STRIP.AUTO: NEGATIVE
LYMPHOCYTES # BLD AUTO: 2.24 K/UL (ref 1–4.8)
LYMPHOCYTES NFR BLD: 20.5 % (ref 22–41)
MCH RBC QN AUTO: 30.1 PG (ref 27–33)
MCHC RBC AUTO-ENTMCNC: 32.8 G/DL (ref 33.6–35)
MCV RBC AUTO: 91.8 FL (ref 81.4–97.8)
MICRO URNS: ABNORMAL
MONOCYTES # BLD AUTO: 0.85 K/UL (ref 0–0.85)
MONOCYTES NFR BLD AUTO: 7.8 % (ref 0–13.4)
NEUTROPHILS # BLD AUTO: 7.49 K/UL (ref 2–7.15)
NEUTROPHILS NFR BLD: 68.6 % (ref 44–72)
NITRITE UR QL STRIP.AUTO: NEGATIVE
NITRITE UR QL STRIP.AUTO: NEGATIVE
NRBC # BLD AUTO: 0 K/UL
NRBC BLD-RTO: 0 /100 WBC
PH UR STRIP.AUTO: 6.5 [PH]
PH UR STRIP.AUTO: 6.5 [PH] (ref 5–8)
PLATELET # BLD AUTO: 192 K/UL (ref 164–446)
PMV BLD AUTO: 11 FL (ref 9–12.9)
PROT UR QL STRIP: NEGATIVE MG/DL
PROT UR QL STRIP: NEGATIVE MG/DL
RBC # BLD AUTO: 4.15 M/UL (ref 4.2–5.4)
RBC # URNS HPF: ABNORMAL /HPF
RBC UR QL AUTO: NEGATIVE
RBC UR QL AUTO: NORMAL
RH BLD: NORMAL
RUBV AB SER QL: 7.1 IU/ML
RUBV IGM SER IA-ACNC: 7.1
SP GR UR STRIP.AUTO: 1.02
SP GR UR STRIP.AUTO: 1.03
TREPONEMA PALLIDUM IGG+IGM AB [PRESENCE] IN SERUM OR PLASMA BY IMMUNOASSAY: NON REACTIVE
UROBILINOGEN UR STRIP-MCNC: NORMAL MG/DL
UROBILINOGEN UR STRIP.AUTO-MCNC: 0.2 MG/DL
WBC # BLD AUTO: 10.9 K/UL (ref 4.8–10.8)
WBC #/AREA URNS HPF: ABNORMAL /HPF

## 2018-06-13 PROCEDURE — 86850 RBC ANTIBODY SCREEN: CPT

## 2018-06-13 PROCEDURE — 86900 BLOOD TYPING SEROLOGIC ABO: CPT

## 2018-06-13 PROCEDURE — 87491 CHLMYD TRACH DNA AMP PROBE: CPT

## 2018-06-13 PROCEDURE — 81002 URINALYSIS NONAUTO W/O SCOPE: CPT | Performed by: NURSE PRACTITIONER

## 2018-06-13 PROCEDURE — 36415 COLL VENOUS BLD VENIPUNCTURE: CPT

## 2018-06-13 PROCEDURE — 87389 HIV-1 AG W/HIV-1&-2 AB AG IA: CPT

## 2018-06-13 PROCEDURE — 86780 TREPONEMA PALLIDUM: CPT

## 2018-06-13 PROCEDURE — 87340 HEPATITIS B SURFACE AG IA: CPT

## 2018-06-13 PROCEDURE — 86762 RUBELLA ANTIBODY: CPT

## 2018-06-13 PROCEDURE — 85025 COMPLETE CBC W/AUTO DIFF WBC: CPT

## 2018-06-13 PROCEDURE — 88175 CYTOPATH C/V AUTO FLUID REDO: CPT

## 2018-06-13 PROCEDURE — 81001 URINALYSIS AUTO W/SCOPE: CPT

## 2018-06-13 PROCEDURE — 87591 N.GONORRHOEAE DNA AMP PROB: CPT

## 2018-06-13 PROCEDURE — 81511 FTL CGEN ABNOR FOUR ANAL: CPT

## 2018-06-13 PROCEDURE — 59402 PR NEW OB HIGH RISK: CPT | Performed by: NURSE PRACTITIONER

## 2018-06-13 PROCEDURE — 86901 BLOOD TYPING SEROLOGIC RH(D): CPT

## 2018-06-13 NOTE — PROGRESS NOTES
Pt here for New OB today  Phone: [phone]  Pharmacy verified  Pt is taking PNV  Desires AFP  Desires BTL  Pt was seen at [facility] on 6-5-18 for RUQ pain, pt report she is still having pain

## 2018-06-13 NOTE — PROGRESS NOTES
S:  Terri Alvares is a 37 y.o.  who presents for her new OB exam.  She is 18w0d with and YEE of Estimated Date of Delivery: 18 by  ultrasound performed by Dr. Finch. She has had an ER visit on 18 with dx of lap band pain. She had the lap band decompressed upon positive pregnancy. She is followed by [facility].  She states hx of depression and does not have a care provider. Is stable on mental health status and has no requests for referrals. States hx of herpes with last outbreak last pregnancy.  She absolutely states no GDM history.     desires AFP.  decline CF.  Denies VB, LOF, or cramping.  Denies dysuria, vaginal DC. Reports positive fetal movement.     Patient lives with FOB and pregnancy is desired     Past Medical History:   Diagnosis Date   • Allergy     seasonal   • Depression    • GDM (gestational diabetes mellitus) 7/3/2013    denies, states treated r/t previous gastric surgery. NOT current diabetic   • Psychiatric disorder    • Suicidal attempted      Family History   Problem Relation Age of Onset   • Diabetes Mother      dialysis   • Diabetes Brother    • Diabetes Maternal Aunt    • Diabetes Maternal Grandmother      Social History     Social History   • Marital status: Single     Spouse name: N/A   • Number of children: N/A   • Years of education: N/A     Occupational History   • Not on file.     Social History Main Topics   • Smoking status: Former Smoker     Types: Cigarettes     Quit date: 2013   • Smokeless tobacco: Never Used      Comment: last used 3/13 due to pregnancy   • Alcohol use No      Comment: occ    • Drug use: No   • Sexual activity: Yes     Partners: Male     Other Topics Concern   • Not on file     Social History Narrative   • No narrative on file     OB History    Para Term  AB Living   4 2 2   1 2   SAB TAB Ectopic Molar Multiple Live Births     0     0 2      # Outcome Date GA Lbr Frank/2nd Weight Sex Delivery Anes PTL Lv   4 Current             3 Term 04/10/16 41w0d  2.722 kg (6 lb) F Vag-Spont EPI N ROWAN      Birth Comments: 1 day procedure   2 Term 13 39w0d  3.135 kg (6 lb 14.6 oz) F VAGINAL TREY  N ROWAN   1 AB 98              Birth Comments: System Generated. Please review and update pregnancy details.          History of HSV I or II in self or partner: no  History of Thyroid problems: no    O:  There were no vitals filed for this visit.   See Prenatal Physical.    Wet mount: none      A:   1.  IUP @ 18w0d per         2.  S=D        3.  See problem list below              Patient Active Problem List    Diagnosis Date Noted   •  (normal spontaneous vaginal delivery) 04/10/2016   • BV (bacterial vaginosis) 2016   • SUPERVISION OF HIGH-RISK PREGNANCY 2013   • AMA (advanced maternal age) multigravida 35+ - wants AFP, declines PANN 2013   • History of laparoscopic adjustable gastric banding 2013   • History of depression 2013   • History of suicide attempt 2013   Herpes history    P:  1.  GC/CT & pap done        2.  Prenatal labs ordered - lab slip given        3.  Discussed PNV, diet, avoidances and adequate water intake        4.  NOB packet given        5.  Return to office in 4 wks        6.  Complete OB US to be done with Dr Orona.Patients referral for AMA status and for genetic counseling.            No orders of the defined types were placed in this encounter.

## 2018-06-13 NOTE — PROGRESS NOTES
"Subjective:      Terri Alvares is a 37 y.o. female who presents with No chief complaint on file.            HPI    ROS       Objective:     /80   Ht 1.626 m (5' 4\")   Wt 95.7 kg (211 lb)   BMI 36.22 kg/m²      Physical Exam   Constitutional: She is oriented to person, place, and time. She appears well-developed and well-nourished.   HENT:   Head: Normocephalic.   Eyes: Pupils are equal, round, and reactive to light.   Neck: Normal range of motion. No thyromegaly present.   Cardiovascular: Normal rate, regular rhythm and normal heart sounds.    Pulmonary/Chest: Effort normal and breath sounds normal.   Abdominal: Soft. Bowel sounds are normal.   Genitourinary: Vagina normal and uterus normal.   Musculoskeletal: Normal range of motion.   Neurological: She is alert and oriented to person, place, and time.   Skin: Skin is warm and dry.   Psychiatric: She has a normal mood and affect. Her behavior is normal. Judgment and thought content normal.   Nursing note and vitals reviewed.              Assessment/Plan:     1. Encounter for supervision of normal pregnancy, antepartum, unspecified     - US-OB 2ND 3RD TRI COMPLETE; Future  - PREG CNTR PRENATAL PN; Future  - THINPREP RFLX HPV ASCUS W/CTNG; Future  - POCT Urinalysis  - POCT Urinalysis  - REFERRAL TO PERINATOLOGY  - AFP TETRA; Future    2. HSV infection  Anticipate acyclovir if current outbreak and for prophylaxis at 36 weeks.       "

## 2018-06-13 NOTE — LETTER
Cystic Fibrosis Carrier Testing  Terri Alvares    The following information is about a blood test that can be done to determine if you and/or your partner carry the gene for cystic fibrosis.    WHAT IS CYSTIC FIBROSIS?  · Cystic fibrosis (CF) is an inherited disease that affects more than 25,000 [place] children and young adults.  · Symptoms of CF vary but include lung congestion, pneumonia, diarrhea and poor growth.  Most people with CF have severe medical problems and some die at a young age.  Others have so few symptoms they are unaware they have CF.  · CF does not affect intelligence.  · Although there is no cure for CF at this time, scientists are making progress in improving treatment and in searching for a cure.  In the past many people with CF  at a very young age.  Today, many are living into their 20’s and 30’s.    IS THERE A CHANCE MY BABY COULD HAVE CYSTIC FIBROSIS?  · You can have a child with CF even if there is no history in your family (see chart below).  · CF testing can help determine if you are a carrier and at risk to have a child with CF.  Note: if both parents are carriers, there is a 1 in 4 (25%) chance with each pregnancy that they will have a child with CF.  · Carriers have one normal CF gene and one altered CF gene.  · People with CF have two altered CF genes.  · Most people have two normal copies of the CF gene.    Approximate risk that a couple with no family history of cystic fibrosis will have a child with cystic fibrosis:    Ethnic background / Risk     couple:  1 in 2,500   couple:  1 in 15,000            couple:  1 in 8,000     American couple:  1 in 32,000     WHAT TESTING IS AVAILABLE?  · There is a blood test that can be done to find out if you or your partner is a carrier.  · It is important to understand that CF carrier testing does not detect all CF carriers.  · If the test shows that you are both CF carriers, you unborn baby can be  tested to find out if the baby has CF.    HOW MUCH DOES IT COST TO HAVE CYSTIC FIBROSIS CARRIER TESTING?  · Cost and insurance coverage for CF carrier testing vary depending upon the laboratory used and your insurance policy.  · The average cost for CF carrier testing is $300 per person.  · Your genetic counselor can provide you with more information about cystic fibrosis carrier testing.    _____  Yes, I am interested in discussing carrier testing with a genetic counselor.    _____  No, I am not interested in CF carrier testing or in receiving more information about CF carrier testing.      Client signature: ________________________________________  6/13/2018

## 2018-06-14 PROBLEM — O09.899 RUBELLA NON-IMMUNE STATUS, ANTEPARTUM: Status: ACTIVE | Noted: 2018-06-14

## 2018-06-14 PROBLEM — Z28.39 RUBELLA NON-IMMUNE STATUS, ANTEPARTUM: Status: ACTIVE | Noted: 2018-06-14

## 2018-06-15 DIAGNOSIS — Z34.90 ENCOUNTER FOR SUPERVISION OF NORMAL PREGNANCY, ANTEPARTUM, UNSPECIFIED GRAVIDITY: ICD-10-CM

## 2018-06-17 PROBLEM — R77.2 ABNORMAL AFP3 TEST: Status: ACTIVE | Noted: 2018-06-17

## 2018-06-18 LAB
C TRACH DNA GENITAL QL NAA+PROBE: NEGATIVE
CYTOLOGY REG CYTOL: NORMAL
N GONORRHOEA DNA GENITAL QL NAA+PROBE: NEGATIVE
SPECIMEN SOURCE: NORMAL

## 2018-06-26 ENCOUNTER — TELEPHONE (OUTPATIENT)
Dept: OBGYN | Facility: CLINIC | Age: 38
End: 2018-06-26

## 2018-06-26 NOTE — TELEPHONE ENCOUNTER
Betina from Dr. Orona called regarding AFP test results asking to get recalculation due to was process as twin pregnancy.  I called [facility] main lab and spoke with David to get recalculation based on single pregnancy instead of twin as was resulted. .  He will call to get an update.  Will be ready on 6/27/18 per David.

## 2018-06-27 ENCOUNTER — TELEPHONE (OUTPATIENT)
Dept: OBGYN | Facility: CLINIC | Age: 38
End: 2018-06-27

## 2018-06-27 NOTE — TELEPHONE ENCOUNTER
Per [facility] request I called the lab to have them recalculate patient's AFP. AFP was ran as twins instead of small. Spoke to Ani at the lab, per Ani AFP will be recalculated. 2:50p AO

## 2018-06-28 LAB
# FETUSES US: ABNORMAL
AFP MOM SERPL: 0.8
AFP SERPL-MCNC: 27 NG/ML
AGE - REPORTED: 40.3 YR
CURRENT SMOKER: NO
FAMILY MEMBER DISEASES HX: NO
GA METHOD: ABNORMAL
GA: ABNORMAL WK
HCG MOM SERPL: 0.46
HCG SERPL-ACNC: 8354 IU/L
HX OF HEREDITARY DISORDERS: NO
IDDM PATIENT QL: NO
INHIBIN A MOM SERPL: 0.79
INHIBIN A SERPL-MCNC: 108 PG/ML
INTEGRATED SCN PATIENT-IMP: ABNORMAL
PATHOLOGY STUDY: ABNORMAL
SPECIMEN DRAWN SERPL: ABNORMAL
U ESTRIOL MOM SERPL: 0.97
U ESTRIOL SERPL-MCNC: 1.25 NG/ML

## 2018-06-29 ENCOUNTER — TELEPHONE (OUTPATIENT)
Dept: OBGYN | Facility: CLINIC | Age: 38
End: 2018-06-29

## 2018-06-29 NOTE — TELEPHONE ENCOUNTER
----- Message from HU SandovalNALEX sent at 6/28/2018  1:04 PM PDT -----  Re-calculated AFP wnl - please let pt know.

## 2018-07-11 ENCOUNTER — ROUTINE PRENATAL (OUTPATIENT)
Dept: OBGYN | Facility: CLINIC | Age: 38
End: 2018-07-11
Payer: MEDICAID

## 2018-07-11 VITALS — SYSTOLIC BLOOD PRESSURE: 122 MMHG | DIASTOLIC BLOOD PRESSURE: 66 MMHG | WEIGHT: 212 LBS | BODY MASS INDEX: 36.39 KG/M2

## 2018-07-11 DIAGNOSIS — R77.2 ABNORMAL AFP3 TEST: ICD-10-CM

## 2018-07-11 PROCEDURE — 90040 PR PRENATAL FOLLOW UP: CPT | Performed by: NURSE PRACTITIONER

## 2018-07-11 NOTE — PROGRESS NOTES
S:  Pt is  at 22w0d here for routine OB follow up.  Reports spotting following sex, no current bleeding.  Reports good FM.  Denies VB, LOF, RUCs, or vaginal DC.     O:  Please see above vitals.        FHTs: 155        Fundal ht: 23 cm.        AFP: recalculated as wnl -- reviewed w pt.    Complete OB US  Done with Dr. Orona, no report in media    A:  IUP 22w0d  Patient Active Problem List    Diagnosis Date Noted   • Abnormal AFP3 test - 1:2 risk Trisomy 18, referral placed to Dr Orona.  2018   • Rubella non-immune  2018   • HSV infection 2018   • SUPERVISION OF HIGH-RISK PREGNANCY 2013   • Advanced maternal age in multigravida 2013   • History of laparoscopic adjustable gastric banding 2013   • History of depression 2013   • History of suicide attempt 2013        P:  1.  Reviewed labs, ultrasound w pt.          2.  Questions answered.          3.  Encouraged adequate water intake        4.  F/u 4 wks.

## 2018-07-11 NOTE — PROGRESS NOTES
Pt here today for OB follow up  Reports +FM  WT: 212 lb  BP: 122/66  Pt states she had a little bit of vagina spotting yesterday and the day before. States no spotting today.   Good # [phone]

## 2018-08-20 ENCOUNTER — ROUTINE PRENATAL (OUTPATIENT)
Dept: OBGYN | Facility: CLINIC | Age: 38
End: 2018-08-20
Payer: MEDICAID

## 2018-08-20 VITALS — SYSTOLIC BLOOD PRESSURE: 120 MMHG | BODY MASS INDEX: 37.42 KG/M2 | DIASTOLIC BLOOD PRESSURE: 64 MMHG | WEIGHT: 218 LBS

## 2018-08-20 DIAGNOSIS — O09.521 SUPERVISION OF HIGH RISK ELDERLY MULTIGRAVIDA IN FIRST TRIMESTER: ICD-10-CM

## 2018-08-20 DIAGNOSIS — R77.2 ABNORMAL AFP3 TEST: ICD-10-CM

## 2018-08-20 PROCEDURE — 90040 PR PRENATAL FOLLOW UP: CPT | Performed by: NURSE PRACTITIONER

## 2018-08-20 PROCEDURE — 90471 IMMUNIZATION ADMIN: CPT | Performed by: NURSE PRACTITIONER

## 2018-08-20 PROCEDURE — 90715 TDAP VACCINE 7 YRS/> IM: CPT | Performed by: NURSE PRACTITIONER

## 2018-08-20 NOTE — PROGRESS NOTES
Pt here today for OB follow up  Reports +FM  WT: 218 lb  BP: 120/64  DesiresTdap vaccine  LORETA sheet given and explained today  Desires BTL, Consent signed today  1 hr gtt, H/H, and T.Pallidum lab slip given today with instructions.  [place] # [phone]    Tdap vaccine given. Right Deltoid. VIS given and screening check list reviewed with pt.  Tdap vaccine verified by Eva Wlels ([place])

## 2018-08-20 NOTE — PROGRESS NOTES
SUBJECTIVE:  Pt is a 38 y.o.   at 27w5d  gestation. Presents today for follow-up prenatal care. Reports no issues at this time.  Reports fetal movement. Denies cramping/contractions, bleeding or leaking of fluid. Denies dysuria, headaches, N/V, or other issues at this time. Generally feels well today. Reports moods stable no continuous depression issues. Reports she has had issues with yeast but has never been tested to make sure that is what it is. She does not desire to be tested today. No herpes outbreaks.     OBJECTIVE:  - See prenatal vitals flow  -   Vitals:    18 1355   BP: 120/64   Weight: 98.9 kg (218 lb)                 ASSESSMENT:   - IUP at 27w5d    - S=D   -   Patient Active Problem List    Diagnosis Date Noted   • Abnormal AFP3 test - 1:2 risk Trisomy 18, referral placed to Dr Orona.  2018   • Rubella non-immune  2018   • HSV infection 2018   • Supervision of high risk elderly multigravida in first trimester 2013   • Advanced maternal age in multigravida 2013   • History of laparoscopic adjustable gastric banding 2013   • History of depression 2013   • History of suicide attempt 2013         PLAN:  - S/sx pregnancy and labor warning signs vs general discomforts discussed  - Fetal movements and kick counts reviewed   - Adequate hydration reinforced  - Nutrition/exercise/vitamin education; continued PNV  - Kick counts reviewed  - Pregnancy support belt reviewed   - S/p TDAP vacc  - Anticipatory guidance given  - RTC in 2 weeks for follow-up prenatal care

## 2018-08-20 NOTE — LETTER
"Count Your Baby's Movements  Another step to a healthy delivery    Terri Alvares             Dept: [phone]    How Many Weeks Pregnant? 27W5D    Date to Begin Counting: Today 08/20/2018              How to use this chart    One way for your physician to keep track of your baby's health is by knowing how often the baby moves (or \"kicks\") in your womb.  You can help your physician to do this by using this chart every day.    Every day, you should see how many hours it takes for your baby to move 10 times.  Start in the morning, as soon as you get up.    · First, write down the time your baby moves until you get to 10.  · Check off one box every time your baby moves until you get to 10.  · Write down the time you finished counting in the last column.  · Total how long it took to count up all 10 movements.  · Finally, fill in the box that shows how long this took.  After counting 10 movements, you no longer have to count any more that day.  The next morning, just start counting again as soon as you get up.    What should you call a \"movement\"?  It is hard to say, because it will feel different from one mother to another and from one pregnancy to the next.  The important thing is that you count the movements the same way throughout your pregnancy.  If you have more questions, you should ask your physician.    Count carefully every day!  SAMPLE:  Week 28    How many hours did it take to feel 10 movements?       Start  Time     1     2     3     4     5     6     7     8     9     10   Finish Time   Mon 8:20 ·  ·  ·  ·  ·  ·  ·  ·  ·  ·  11:40   Tue Wed Thu Fri               Sat               Sun                 IMPORTANT: You should contact your physician if it takes more than two hours for you to feel 10 movements.  Each morning, write down the time and start to count the movements of your baby.  Keep track by checking off one box every time you feel one movement.  When you " "have felt 10 \"kicks\", write down the time you finished counting in the last column.  Then fill in the   box (over the check rob) for the number of hours it took.  Be sure to read the complete instructions on the previous page.            " 17-Nov-2017 00:45

## 2018-08-28 ENCOUNTER — HOSPITAL ENCOUNTER (OUTPATIENT)
Dept: LAB | Facility: MEDICAL CENTER | Age: 38
End: 2018-08-28
Attending: NURSE PRACTITIONER
Payer: MEDICAID

## 2018-08-28 DIAGNOSIS — O09.521 SUPERVISION OF HIGH RISK ELDERLY MULTIGRAVIDA IN FIRST TRIMESTER: ICD-10-CM

## 2018-08-28 DIAGNOSIS — R77.2 ABNORMAL AFP3 TEST: ICD-10-CM

## 2018-08-28 LAB
GLUCOSE 1H P 50 G GLC PO SERPL-MCNC: 127 MG/DL (ref 70–139)
HCT VFR BLD AUTO: 41.4 % (ref 37–47)
HGB BLD-MCNC: 13 G/DL (ref 12–16)
TREPONEMA PALLIDUM IGG+IGM AB [PRESENCE] IN SERUM OR PLASMA BY IMMUNOASSAY: NON REACTIVE

## 2018-08-28 PROCEDURE — 85014 HEMATOCRIT: CPT

## 2018-08-28 PROCEDURE — 82950 GLUCOSE TEST: CPT

## 2018-08-28 PROCEDURE — 85018 HEMOGLOBIN: CPT

## 2018-08-28 PROCEDURE — 86780 TREPONEMA PALLIDUM: CPT

## 2018-08-28 PROCEDURE — 36415 COLL VENOUS BLD VENIPUNCTURE: CPT

## 2018-09-05 ENCOUNTER — ROUTINE PRENATAL (OUTPATIENT)
Dept: OBGYN | Facility: CLINIC | Age: 38
End: 2018-09-05
Payer: MEDICAID

## 2018-09-05 VITALS — DIASTOLIC BLOOD PRESSURE: 72 MMHG | BODY MASS INDEX: 37.76 KG/M2 | SYSTOLIC BLOOD PRESSURE: 128 MMHG | WEIGHT: 220 LBS

## 2018-09-05 DIAGNOSIS — R30.0 DYSURIA: ICD-10-CM

## 2018-09-05 DIAGNOSIS — R77.2 ABNORMAL AFP3 TEST: ICD-10-CM

## 2018-09-05 DIAGNOSIS — O23.43 UTI IN PREGNANCY, ANTEPARTUM, THIRD TRIMESTER: ICD-10-CM

## 2018-09-05 DIAGNOSIS — O09.521 SUPERVISION OF HIGH RISK ELDERLY MULTIGRAVIDA IN FIRST TRIMESTER: Primary | ICD-10-CM

## 2018-09-05 LAB
APPEARANCE UR: CLEAR
BILIRUB UR STRIP-MCNC: NORMAL MG/DL
COLOR UR AUTO: YELLOW
GLUCOSE UR STRIP.AUTO-MCNC: NEGATIVE MG/DL
KETONES UR STRIP.AUTO-MCNC: NEGATIVE MG/DL
LEUKOCYTE ESTERASE UR QL STRIP.AUTO: NEGATIVE
NITRITE UR QL STRIP.AUTO: NEGATIVE
PH UR STRIP.AUTO: 6 [PH] (ref 5–8)
PROT UR QL STRIP: NORMAL MG/DL
RBC UR QL AUTO: NORMAL
SP GR UR STRIP.AUTO: 1.02
UROBILINOGEN UR STRIP-MCNC: NORMAL MG/DL

## 2018-09-05 PROCEDURE — 90040 PR PRENATAL FOLLOW UP: CPT | Performed by: NURSE PRACTITIONER

## 2018-09-05 PROCEDURE — 81002 URINALYSIS NONAUTO W/O SCOPE: CPT | Performed by: NURSE PRACTITIONER

## 2018-09-05 RX ORDER — NITROFURANTOIN 25; 75 MG/1; MG/1
100 CAPSULE ORAL 2 TIMES DAILY
Qty: 14 CAP | Refills: 0 | Status: SHIPPED | OUTPATIENT
Start: 2018-09-05 | End: 2018-09-12

## 2018-09-05 NOTE — PROGRESS NOTES
S:  Pt is  at 30w0d for routine OB follow up.  C/o dysuria.  Reports good FM.  Denies VB, LOF, RUCs or vaginal DC.    O:  Please see above vitals.        FHTs: 152        Fundal ht: 30 cm.        1hr GTT: wnl -- reviewed w pt.        UA: small blood.    A:  IUP at 30w0d  Patient Active Problem List    Diagnosis Date Noted   • UTI in pregnancy, antepartum, third trimester 2018   • Abnormal AFP3 test - 1:2 risk Trisomy 18, referral placed to Dr Orona.  2018   • Rubella non-immune  2018   • HSV infection 2018   • Supervision of high risk elderly multigravida in second trimester 2013   • Advanced maternal age in multigravida 2013   • History of laparoscopic adjustable gastric banding 2013   • History of depression 2013   • History of suicide attempt 2013        P:  1.  PP contraception: BTL, consent already signed.        2.  Continue FKCs.          3.  Questions answered.          4.  Encouraged pt to tour L&D.          5.  Encourage adequate water intake.        6.  F/u 2 wks.        7.  Tdap already given.          8.  Rx for macrobid sent to pharmacy.

## 2018-09-05 NOTE — PATIENT INSTRUCTIONS
P:  1.  PP contraception: BTL, consent already signed.        2.  Continue FKCs.          3.  Questions answered.          4.  Encouraged pt to tour L&D.          5.  Encourage adequate water intake.        6.  F/u 2 wks.        7.  Tdap already given.          8.  Rx for macrobid sent to pharmacy.

## 2018-09-19 ENCOUNTER — HOSPITAL ENCOUNTER (OUTPATIENT)
Facility: MEDICAL CENTER | Age: 38
End: 2018-09-19
Attending: NURSE PRACTITIONER
Payer: MEDICAID

## 2018-09-19 ENCOUNTER — ROUTINE PRENATAL (OUTPATIENT)
Dept: OBGYN | Facility: CLINIC | Age: 38
End: 2018-09-19
Payer: MEDICAID

## 2018-09-19 VITALS — WEIGHT: 223.8 LBS | SYSTOLIC BLOOD PRESSURE: 118 MMHG | DIASTOLIC BLOOD PRESSURE: 80 MMHG | BODY MASS INDEX: 38.42 KG/M2

## 2018-09-19 DIAGNOSIS — R35.0 FREQUENT URINATION: ICD-10-CM

## 2018-09-19 LAB
APPEARANCE UR: NORMAL
BILIRUB UR STRIP-MCNC: NORMAL MG/DL
COLOR UR AUTO: NORMAL
GLUCOSE UR STRIP.AUTO-MCNC: NEGATIVE MG/DL
KETONES UR STRIP.AUTO-MCNC: NORMAL MG/DL
LEUKOCYTE ESTERASE UR QL STRIP.AUTO: NEGATIVE
NITRITE UR QL STRIP.AUTO: NEGATIVE
PH UR STRIP.AUTO: 6 [PH] (ref 5–8)
PROT UR QL STRIP: NORMAL MG/DL
RBC UR QL AUTO: NORMAL
SP GR UR STRIP.AUTO: 1.03
UROBILINOGEN UR STRIP-MCNC: NORMAL MG/DL

## 2018-09-19 PROCEDURE — 90040 PR PRENATAL FOLLOW UP: CPT | Performed by: NURSE PRACTITIONER

## 2018-09-19 PROCEDURE — 87086 URINE CULTURE/COLONY COUNT: CPT

## 2018-09-19 PROCEDURE — 81002 URINALYSIS NONAUTO W/O SCOPE: CPT | Performed by: NURSE PRACTITIONER

## 2018-09-19 NOTE — PROGRESS NOTES
Patient here today for OB follow up, patient states good FM, denies VB and LOF.  Patient states she is still having frequent urination, does not know if its due to pregnancy or bladder infection  Pharm verified  Good # [phone]

## 2018-09-19 NOTE — PROGRESS NOTES
SUBJECTIVE:  Pt is a 38 y.o.   at 32w0d  gestation. Presents today for follow-up prenatal care. Reports still having urinary frequency even after treatment with macrobid.   Reports good  fetal movement. Denies cramping/contractions, bleeding or leaking of fluid. Denies headaches, N/V, or other issues at this time. Generally feels well today. States no hsv ourbreaks.     OBJECTIVE:  - See prenatal vitals flow  -   Vitals:    18 1616   BP: 118/80   Weight: 101.5 kg (223 lb 12.8 oz)      Labs - normal prenatal labs  US - normal fetal survey            ASSESSMENT:   - IUP at 32w0d    - S=D   -   Patient Active Problem List    Diagnosis Date Noted   • UTI in pregnancy, antepartum, third trimester 2018   • Abnormal AFP3 test - 1:2 risk Trisomy 18, referral placed to Dr Orona.  2018   • Rubella non-immune  2018   • HSV infection 2018   • Supervision of high risk elderly multigravida in second trimester 2013   • Advanced maternal age in multigravida 2013   • History of laparoscopic adjustable gastric banding 2013   • History of depression 2013   • History of suicide attempt 2013         PLAN:  - S/sx pregnancy and labor warning signs vs general discomforts discussed  - Fetal movements and kick counts reviewed   - Adequate hydration reinforced  - Nutrition/exercise/vitamin education; continued PNV  -  Patient has large blood in clean catch today. UA to lab for culture  Patient is stable with mental health status and does not request any resources.   Patient is having pain with lap band and will have repair/replacement after the birth.

## 2018-09-20 ENCOUNTER — TELEPHONE (OUTPATIENT)
Dept: OBGYN | Facility: CLINIC | Age: 38
End: 2018-09-20

## 2018-09-20 NOTE — TELEPHONE ENCOUNTER
I returned Pt's call about her lab results and let her know that they aren't finalized yet, I'll check on them tomorrow and call her back.

## 2018-09-22 LAB
BACTERIA UR CULT: NORMAL
SIGNIFICANT IND 70042: NORMAL
SITE SITE: NORMAL
SOURCE SOURCE: NORMAL

## 2018-09-24 ENCOUNTER — TELEPHONE (OUTPATIENT)
Dept: OBGYN | Facility: CLINIC | Age: 38
End: 2018-09-24

## 2018-10-04 ENCOUNTER — ROUTINE PRENATAL (OUTPATIENT)
Dept: OBGYN | Facility: CLINIC | Age: 38
End: 2018-10-04
Payer: MEDICAID

## 2018-10-04 VITALS — DIASTOLIC BLOOD PRESSURE: 82 MMHG | SYSTOLIC BLOOD PRESSURE: 118 MMHG | BODY MASS INDEX: 38.88 KG/M2 | WEIGHT: 226.5 LBS

## 2018-10-04 DIAGNOSIS — O09.521 SUPERVISION OF HIGH RISK ELDERLY MULTIGRAVIDA IN FIRST TRIMESTER: Primary | ICD-10-CM

## 2018-10-04 DIAGNOSIS — O36.8190 DECREASED FETAL MOVEMENT AFFECTING MANAGEMENT OF PREGNANCY, ANTEPARTUM, SINGLE OR UNSPECIFIED FETUS: ICD-10-CM

## 2018-10-04 LAB
NST ACOUSTIC STIMULATION: NORMAL
NST ACTION NECESSARY: NORMAL
NST ASSESSMENT: NORMAL
NST BASELINE: 135
NST INDICATIONS: NORMAL
NST OTHER DATA: NORMAL
NST READ BY: NORMAL
NST RETURN: NO
NST UTERINE ACTIVITY: NORMAL

## 2018-10-04 PROCEDURE — 90040 PR PRENATAL FOLLOW UP: CPT | Performed by: NURSE PRACTITIONER

## 2018-10-04 PROCEDURE — 90686 IIV4 VACC NO PRSV 0.5 ML IM: CPT | Performed by: NURSE PRACTITIONER

## 2018-10-04 PROCEDURE — 90471 IMMUNIZATION ADMIN: CPT | Performed by: NURSE PRACTITIONER

## 2018-10-04 PROCEDURE — 59025 FETAL NON-STRESS TEST: CPT | Performed by: NURSE PRACTITIONER

## 2018-10-04 NOTE — PROGRESS NOTES
S) Pt is a 38 y.o.   at 34w1d  gestation. Routine prenatal care today. Pt states feeling decreased FM over last week.  Reviwed with pt change in movement as baby grows and very important to get 10x/2 hr movements.    Fetal movement abNormal-decreased  Cramping no  VB no  LOF no   Denies dysuria. Generally feels well today. Good self-care activities identified. Denies headaches, swelling, visual changes, or epigastric pain .     O) Blood pressure 118/82, weight 102.7 kg (226 lb 8 oz), unknown if currently breastfeeding.        Labs: WNL       PNL: WNL       GCT: 127       AFP: normal       GBS: N/A       Pertinent ultrasound - 18 Dr Orona-WNL, OMKAR normal.           A) IUP at 34w1d       S=D         Patient Active Problem List    Diagnosis Date Noted   • UTI in pregnancy, antepartum, third trimester 2018   • Abnormal AFP3 test - 1:2 risk Trisomy 18, referral placed to Dr Orona.  2018   • Rubella non-immune  2018   • HSV infection 2018   • Supervision of high risk elderly multigravida in second trimester 2013   • Advanced maternal age in multigravida 2013   • History of laparoscopic adjustable gastric banding 2013   • History of depression 2013   • History of suicide attempt 2013          SVE:deferred         TDAP: yes       FLU: yes        BTL: yes       : no       C/S Consent: no       IOL or C/S scheduled: no       LAST PAP: 18 negative         P) s/s ptl vs general discomforts.   Fetal movements reviewed. NST now.   General ed and anticipatory guidance. Nutrition/exercise/vitamin. Plans breast Plans pp contraception- BTL  GBS next visit.  RTC 2 weeks or PRN.

## 2018-10-04 NOTE — PROGRESS NOTES
Patient here today for OB follow up @ 90gwi5f, denies VB and LOF.  Patient states decrease in FM  Flu vaccine given today, Right deltoid. Screening checklist reviewed with patient. Verified by BREANN GUTIERREZ  Pharm verified  Good # [phone]

## 2018-10-19 ENCOUNTER — HOSPITAL ENCOUNTER (OUTPATIENT)
Facility: MEDICAL CENTER | Age: 38
End: 2018-10-19
Attending: NURSE PRACTITIONER
Payer: MEDICAID

## 2018-10-19 ENCOUNTER — ROUTINE PRENATAL (OUTPATIENT)
Dept: OBGYN | Facility: CLINIC | Age: 38
End: 2018-10-19
Payer: MEDICAID

## 2018-10-19 VITALS — WEIGHT: 231 LBS | SYSTOLIC BLOOD PRESSURE: 102 MMHG | DIASTOLIC BLOOD PRESSURE: 76 MMHG | BODY MASS INDEX: 39.65 KG/M2

## 2018-10-19 DIAGNOSIS — Z3A.36 36 WEEKS GESTATION OF PREGNANCY: ICD-10-CM

## 2018-10-19 LAB
APPEARANCE UR: NORMAL
BILIRUB UR STRIP-MCNC: NORMAL MG/DL
COLOR UR AUTO: NORMAL
GLUCOSE UR STRIP.AUTO-MCNC: NEGATIVE MG/DL
KETONES UR STRIP.AUTO-MCNC: NEGATIVE MG/DL
LEUKOCYTE ESTERASE UR QL STRIP.AUTO: NEGATIVE
NITRITE UR QL STRIP.AUTO: NEGATIVE
PH UR STRIP.AUTO: 6 [PH] (ref 5–8)
PROT UR QL STRIP: NEGATIVE MG/DL
RBC UR QL AUTO: NORMAL
SP GR UR STRIP.AUTO: 1.02
UROBILINOGEN UR STRIP-MCNC: NORMAL MG/DL

## 2018-10-19 PROCEDURE — 90040 PR PRENATAL FOLLOW UP: CPT | Performed by: NURSE PRACTITIONER

## 2018-10-19 PROCEDURE — 87150 DNA/RNA AMPLIFIED PROBE: CPT

## 2018-10-19 PROCEDURE — 81002 URINALYSIS NONAUTO W/O SCOPE: CPT | Performed by: NURSE PRACTITIONER

## 2018-10-19 PROCEDURE — 87081 CULTURE SCREEN ONLY: CPT

## 2018-10-19 RX ORDER — ACYCLOVIR 400 MG/1
400 TABLET ORAL 3 TIMES DAILY
Qty: 60 TAB | Refills: 0 | Status: SHIPPED | OUTPATIENT
Start: 2018-10-19 | End: 2018-11-01 | Stop reason: SDUPTHER

## 2018-10-19 RX ORDER — METRONIDAZOLE 500 MG/1
500 TABLET ORAL 2 TIMES DAILY
Qty: 14 TAB | Refills: 0 | Status: ON HOLD | OUTPATIENT
Start: 2018-10-19 | End: 2018-11-19

## 2018-10-21 LAB — GP B STREP DNA SPEC QL NAA+PROBE: NEGATIVE

## 2018-10-24 ENCOUNTER — TELEPHONE (OUTPATIENT)
Dept: OBGYN | Facility: CLINIC | Age: 38
End: 2018-10-24

## 2018-10-24 NOTE — TELEPHONE ENCOUNTER
Pt called stated that she has a migraine and wants to know what to take. I asked pt if she has taken anything so far, pt says she took two 500mg of tylenol this morning but is not working. I informed pt to drink one cup of coffee and if it doesn't go away to call back. Pt verbalized understanding and had no other questions or concerns.     Pt called back stating that she still has a migraine.   Per consult with Dr. Dickson, pt advised to take 25mg of benadryl. If that doesn't work, got to labor and delivery so they can help.   Pt verbalized understanding and had no other questions or concerns.

## 2018-10-28 ENCOUNTER — HOSPITAL ENCOUNTER (OUTPATIENT)
Facility: MEDICAL CENTER | Age: 38
End: 2018-10-28
Attending: OBSTETRICS & GYNECOLOGY | Admitting: OBSTETRICS & GYNECOLOGY
Payer: MEDICAID

## 2018-10-28 VITALS — SYSTOLIC BLOOD PRESSURE: 122 MMHG | DIASTOLIC BLOOD PRESSURE: 77 MMHG | HEART RATE: 69 BPM

## 2018-10-28 PROCEDURE — 59025 FETAL NON-STRESS TEST: CPT

## 2018-10-28 NOTE — PROGRESS NOTES
OB Triage Note    Subjective  Ms. Alvares is a 38 year old  at 37w4d that presents to OB triage with a chief compliant of decreased fetal movement. She noticed decreased movement around 6pm last night. Patient states she has infrequent contractions. No vaginal bleeding, vaginal discharge, or leakage of fluid. Patient denies fever/chills, vision changes, abdominal pain, dysuria, or swelling in her legs.    Objective  - Vitals stable   - General: alert and oriented. No acute distress.   - CV: heart sounds distant. RRR  - Lungs: breathing comfortably on room air without accessory mm. Use. CTAB  - Abdomen: normal bowel sounds. Non-tender to palpation.   - External fetal monitoring: Fetal HR baseline 130s with moderate variability. Reactive NST. Category I tracing. No contractions noted during monitoring period.     Assessment  Ms. Alvares is a 38 year old  at 37w4d that presents to OB triage secondary to concerns of decreased fetal movements. External fetal monitoring revealed a reactive NST.     Plan  - Safe for patient to go home.   - Nursing staff provided patient education and return precautions.   - Patient should continue with her regular pre- care.

## 2018-10-28 NOTE — PROGRESS NOTES
Pt presents to L&D with c/o decreased fetal movement. Pt is a  at 37.4 weeks gestation. Pt denies LOF or VB. External monitors applied, POC discussed. Dr. Yao updated.  1000: Discharge orders received  1010: Discharge instructions given, pt verbalized understanding. Pt ambulated off unit.

## 2018-11-01 ENCOUNTER — ROUTINE PRENATAL (OUTPATIENT)
Dept: OBGYN | Facility: CLINIC | Age: 38
End: 2018-11-01
Payer: MEDICAID

## 2018-11-01 VITALS — BODY MASS INDEX: 40.34 KG/M2 | WEIGHT: 235 LBS | SYSTOLIC BLOOD PRESSURE: 120 MMHG | DIASTOLIC BLOOD PRESSURE: 82 MMHG

## 2018-11-01 DIAGNOSIS — B00.9 HSV INFECTION: ICD-10-CM

## 2018-11-01 DIAGNOSIS — O09.523 SUPERVISION OF HIGH RISK ELDERLY MULTIGRAVIDA IN THIRD TRIMESTER: Primary | ICD-10-CM

## 2018-11-01 PROCEDURE — 90040 PR PRENATAL FOLLOW UP: CPT | Performed by: NURSE PRACTITIONER

## 2018-11-01 RX ORDER — ACYCLOVIR 400 MG/1
400 TABLET ORAL 3 TIMES DAILY
Qty: 60 TAB | Refills: 2 | Status: SHIPPED | OUTPATIENT
Start: 2018-11-01 | End: 2019-02-08

## 2018-11-01 NOTE — PROGRESS NOTES
S:  Pt is  at 38w1d here for routine OB follow up.  Reports some cramping.  Reports good FM.  Denies VB, LOF, RUCs, or vaginal DC.     O:  Please see above vitals.        FHTs: 152        Fundal ht: 38        Fetal position: vertex        SVE: declines today        GBS neg on 10/19/18 -- reviewed w pt.      A:  IUP at 38w1d  Patient Active Problem List    Diagnosis Date Noted   • UTI in pregnancy, antepartum, third trimester 2018   • Abnormal AFP3 test - 1:2 risk Trisomy 18, referral placed to Dr Orona.  2018   • Rubella non-immune  2018   • HSV infection 2018   • Supervision of high risk elderly multigravida in third trimester 2013   • Advanced maternal age in multigravida 2013   • History of laparoscopic adjustable gastric banding 2013   • History of depression 2013   • History of suicide attempt 2013       P:  1.  Continue FKCs.         2.  Labor precautions given.  Instructions given on where to go.  Pt receptive to education.         3.  D/w pt IOL policy.  IOL referral placed.        4.  Questions answered.         5.  Encouraged adequate water intake       6.  F/u 1wk       7.  PP contraception: BTL.       8.  Continue acyclovir, rx refilled to pharmacy.

## 2018-11-01 NOTE — PROGRESS NOTES
Pt here today for OB follow up  Pt states no complaints   Reports +FM  Good # [phone]  Pharmacy Confirmed.  Chaperone offered and not indicated.   GBS negative.

## 2018-11-01 NOTE — PATIENT INSTRUCTIONS
P:  1.  Continue FKCs.         2.  Labor precautions given.  Instructions given on where to go.  Pt receptive to education.         3.  D/w pt IOL policy.  IOL referral placed.        4.  Questions answered.         5.  Encouraged adequate water intake       6.  F/u 1wk       7.  PP contraception: BTL.       8.  Continue acyclovir, rx refilled to pharmacy.

## 2018-11-08 ENCOUNTER — ROUTINE PRENATAL (OUTPATIENT)
Dept: OBGYN | Facility: CLINIC | Age: 38
End: 2018-11-08
Payer: MEDICAID

## 2018-11-08 VITALS — BODY MASS INDEX: 40.17 KG/M2 | WEIGHT: 234 LBS | DIASTOLIC BLOOD PRESSURE: 78 MMHG | SYSTOLIC BLOOD PRESSURE: 108 MMHG

## 2018-11-08 DIAGNOSIS — O09.893 SUPERVISION OF OTHER HIGH RISK PREGNANCIES, THIRD TRIMESTER: ICD-10-CM

## 2018-11-08 PROCEDURE — 90040 PR PRENATAL FOLLOW UP: CPT | Performed by: NURSE PRACTITIONER

## 2018-11-08 NOTE — PROGRESS NOTES
SUBJECTIVE:  Pt is a 38 y.o.   at 39w1d  gestation. Presents today for follow-up prenatal care. Reports no issues at this time.  Lives in [place]. Is inquiring about moving up IOL. Reports good  fetal movement. Denies cramping/contractions, bleeding or leaking of fluid. Denies dysuria, headaches, N/V, or other issues at this time. Generally feels well today. Is taking Acyclovir and reports no recent outbreaks.     OBJECTIVE:  - See prenatal vitals flow  -   Vitals:    18 1436   BP: 108/78   Weight: 106.1 kg (234 lb)      Labs - normal prenatal labs.      US Oki normal media.         ASSESSMENT:   - IUP at 39w1d    - S=D   -   Patient Active Problem List    Diagnosis Date Noted   • UTI in pregnancy, antepartum, third trimester 2018   • Abnormal AFP3 test - 1:2 risk Trisomy 18, referral placed to Dr Orona.  2018   • Rubella non-immune  2018   • HSV infection 2018   • Supervision of high risk elderly multigravida in third trimester 2013   • Advanced maternal age in multigravida 2013   • History of laparoscopic adjustable gastric banding 2013   • History of depression 2013   • History of suicide attempt 2013         PLAN:  - S/sx pregnancy and labor warning signs vs general discomforts discussed  - Fetal movements and kick counts reviewed   - Adequate hydration reinforced  - Nutrition/exercise/vitamin education; continued PNV  - IOL gel canceled as patient is already 3 cm and favorable for labor. IOL moved to  at 8:00 pm

## 2018-11-08 NOTE — PROGRESS NOTES
Pt here today for OB follow up  Pt states UC's and pressure  Reports +FM  Good # [phone]  Pharmacy Confirmed.  Chaperone offered and present.   Pt IOL GEL has been scheduled for 11/22/18 and IOL scheduled for 11/23/18 at 0800.

## 2018-11-09 ENCOUNTER — HOSPITAL ENCOUNTER (OUTPATIENT)
Facility: MEDICAL CENTER | Age: 38
End: 2018-11-09
Attending: OBSTETRICS & GYNECOLOGY | Admitting: OBSTETRICS & GYNECOLOGY
Payer: MEDICAID

## 2018-11-09 VITALS
TEMPERATURE: 97.9 F | HEIGHT: 64 IN | BODY MASS INDEX: 40.12 KG/M2 | DIASTOLIC BLOOD PRESSURE: 80 MMHG | HEART RATE: 70 BPM | WEIGHT: 235 LBS | SYSTOLIC BLOOD PRESSURE: 133 MMHG

## 2018-11-09 LAB — CRYSTALS AMN MICRO: NORMAL

## 2018-11-09 PROCEDURE — 89060 EXAM SYNOVIAL FLUID CRYSTALS: CPT

## 2018-11-09 PROCEDURE — 99212 OFFICE O/P EST SF 10 MIN: CPT | Performed by: NURSE PRACTITIONER

## 2018-11-09 PROCEDURE — 59025 FETAL NON-STRESS TEST: CPT

## 2018-11-10 NOTE — PROGRESS NOTES
"S: Pt is a 38 y.o.  at 39w2d with Estimated Date of Delivery: 18 who presents to triage c/o possible LOF. States she has a dry cough from allergies, and she has been feeling some leaking of fluid. She wants to make sure her water isn't broken.  Denies VB or RUCs.  Reports good FM.      O: /80   Pulse 70   Temp 36.6 °C (97.9 °F) (Temporal)   Ht 1.626 m (5' 4\")   Wt 106.6 kg (235 lb)   BMI 40.34 kg/m²          FHTs: baseline 150, positive accels, negative decels, moderate variability       [employer]: irregular UCs       SVE: 3-4/80/-3 per RN. Maryellen collected and sent         A/P  Patient Active Problem List    Diagnosis Date Noted   • UTI in pregnancy, antepartum, third trimester 2018   • Abnormal AFP3 test - 1:2 risk Trisomy 18, referral placed to Dr Orona.  2018   • Rubella non-immune  2018   • HSV infection 2018   • Supervision of high risk elderly multigravida in third trimester 2013   • Advanced maternal age in multigravida 2013   • History of laparoscopic adjustable gastric banding 2013   • History of depression 2013   • History of suicide attempt 2013     Fern negative    1.  IUP @ 39w2d  2.  Reactive, category 1 NST.  3.  Fern negative  4.  Early/Latent labor  5.  F/u [facility] next week for scheduled appt.    Conchita [place] CNM, APRN      "

## 2018-11-15 ENCOUNTER — ROUTINE PRENATAL (OUTPATIENT)
Dept: OBGYN | Facility: CLINIC | Age: 38
End: 2018-11-15
Payer: MEDICAID

## 2018-11-15 VITALS — WEIGHT: 236 LBS | DIASTOLIC BLOOD PRESSURE: 80 MMHG | BODY MASS INDEX: 40.51 KG/M2 | SYSTOLIC BLOOD PRESSURE: 118 MMHG

## 2018-11-15 DIAGNOSIS — O09.523 SUPERVISION OF HIGH RISK ELDERLY MULTIGRAVIDA IN THIRD TRIMESTER: Primary | ICD-10-CM

## 2018-11-15 PROCEDURE — 90040 PR PRENATAL FOLLOW UP: CPT | Performed by: NURSE PRACTITIONER

## 2018-11-15 NOTE — PROGRESS NOTES
Pt here today for OB follow up  Negative GBS, pt aware  Pt states she has not paid attention to her baby movements today. States she felt baby moved around 5:00 and and just felt baby moved now.   WT: 236 lb  BP: 118/80  Pt states she has been having irregular contractions x 1 week.  Pt also reports having redish mucus discharge .   Pt states she has been coughing, states every time she coughs she feels leakage of fluid x 2 days.   Pt IOL Gel canceled. IOL evening of 11/19 at 8:00 pm. Instructions given to ede.   [place] # [phone]

## 2018-11-16 NOTE — PROGRESS NOTES
S) Pt is a 38 y.o.   at 40w1d  gestation. Routine prenatal care today. Pt states she is having urinary incontinence when she coughs, and she feels like all of her bones are pulling apart in her pelvis.     Fetal movement Normal  Cramping no  VB no  LOF no   Denies dysuria. Generally feels well today. Good self-care activities identified. Denies headaches, swelling, visual changes, or epigastric pain .     O) Blood pressure 118/80, weight 107 kg (236 lb), unknown if currently breastfeeding.        Labs: WNL       PNL: WNL       GCT: 127       AFP: normal       GBS: negative       Pertinent ultrasound - 18 Dr Orona-WNL, OMKAR normal.           A) IUP at 40w1d       S=D         Patient Active Problem List    Diagnosis Date Noted   • UTI in pregnancy, antepartum, third trimester 2018   • Abnormal AFP3 test - 1:2 risk Trisomy 18, referral placed to Dr Orona.  2018   • Rubella non-immune  2018   • HSV infection 2018   • Supervision of high risk elderly multigravida in third trimester 2013   • Advanced maternal age in multigravida 2013   • History of laparoscopic adjustable gastric banding 2013   • History of depression 2013   • History of suicide attempt 2013          SVE: 3-4/80/-2; membranes swept at patient request         TDAP: yes       FLU: yes        BTL: no       : no       C/S Consent: no       IOL or C/S scheduled: yes -        LAST PAP: 18 negative         P) Labour precautions discussed.   Fetal movements reviewed. General ed and anticipatory guidance. Nutrition/exercise/vitamin. Plans breast Plans pp contraception- BTL    Discussed IOL for   RTC PRN

## 2018-11-19 ENCOUNTER — APPOINTMENT (OUTPATIENT)
Dept: OBGYN | Facility: MEDICAL CENTER | Age: 38
End: 2018-11-19
Attending: OBSTETRICS & GYNECOLOGY
Payer: MEDICAID

## 2018-11-19 ENCOUNTER — HOSPITAL ENCOUNTER (INPATIENT)
Facility: MEDICAL CENTER | Age: 38
LOS: 2 days | End: 2018-11-21
Attending: OBSTETRICS & GYNECOLOGY | Admitting: OBSTETRICS & GYNECOLOGY
Payer: MEDICAID

## 2018-11-19 LAB
BASOPHILS # BLD AUTO: 0.3 % (ref 0–1.8)
BASOPHILS # BLD: 0.03 K/UL (ref 0–0.12)
EOSINOPHIL # BLD AUTO: 0.1 K/UL (ref 0–0.51)
EOSINOPHIL NFR BLD: 1 % (ref 0–6.9)
ERYTHROCYTE [DISTWIDTH] IN BLOOD BY AUTOMATED COUNT: 48.2 FL (ref 35.9–50)
ERYTHROCYTE [DISTWIDTH] IN BLOOD BY AUTOMATED COUNT: 48.6 FL (ref 35.9–50)
HCT VFR BLD AUTO: 36.7 % (ref 37–47)
HCT VFR BLD AUTO: 38.3 % (ref 37–47)
HGB BLD-MCNC: 12.3 G/DL (ref 12–16)
HGB BLD-MCNC: 12.6 G/DL (ref 12–16)
HOLDING TUBE BB 8507: NORMAL
IMM GRANULOCYTES # BLD AUTO: 0.12 K/UL (ref 0–0.11)
IMM GRANULOCYTES NFR BLD AUTO: 1.2 % (ref 0–0.9)
LYMPHOCYTES # BLD AUTO: 2.03 K/UL (ref 1–4.8)
LYMPHOCYTES NFR BLD: 19.8 % (ref 22–41)
MCH RBC QN AUTO: 29.8 PG (ref 27–33)
MCH RBC QN AUTO: 30 PG (ref 27–33)
MCHC RBC AUTO-ENTMCNC: 32.9 G/DL (ref 33.6–35)
MCHC RBC AUTO-ENTMCNC: 33.5 G/DL (ref 33.6–35)
MCV RBC AUTO: 89.5 FL (ref 81.4–97.8)
MCV RBC AUTO: 90.5 FL (ref 81.4–97.8)
MONOCYTES # BLD AUTO: 0.66 K/UL (ref 0–0.85)
MONOCYTES NFR BLD AUTO: 6.4 % (ref 0–13.4)
NEUTROPHILS # BLD AUTO: 7.33 K/UL (ref 2–7.15)
NEUTROPHILS NFR BLD: 71.3 % (ref 44–72)
NRBC # BLD AUTO: 0 K/UL
NRBC BLD-RTO: 0 /100 WBC
PLATELET # BLD AUTO: 182 K/UL (ref 164–446)
PLATELET # BLD AUTO: 227 K/UL (ref 164–446)
PMV BLD AUTO: 11.1 FL (ref 9–12.9)
PMV BLD AUTO: 11.5 FL (ref 9–12.9)
RBC # BLD AUTO: 4.1 M/UL (ref 4.2–5.4)
RBC # BLD AUTO: 4.23 M/UL (ref 4.2–5.4)
WBC # BLD AUTO: 10.3 K/UL (ref 4.8–10.8)
WBC # BLD AUTO: 13.6 K/UL (ref 4.8–10.8)

## 2018-11-19 PROCEDURE — 700111 HCHG RX REV CODE 636 W/ 250 OVERRIDE (IP)

## 2018-11-19 PROCEDURE — 304965 HCHG RECOVERY SERVICES

## 2018-11-19 PROCEDURE — 59409 OBSTETRICAL CARE: CPT

## 2018-11-19 PROCEDURE — 770002 HCHG ROOM/CARE - OB PRIVATE (112)

## 2018-11-19 PROCEDURE — 59160 D & C AFTER DELIVERY: CPT | Performed by: OBSTETRICS & GYNECOLOGY

## 2018-11-19 PROCEDURE — 59400 OBSTETRICAL CARE: CPT | Performed by: NURSE PRACTITIONER

## 2018-11-19 PROCEDURE — 700105 HCHG RX REV CODE 258

## 2018-11-19 PROCEDURE — 0UQGXZZ REPAIR VAGINA, EXTERNAL APPROACH: ICD-10-PCS | Performed by: OBSTETRICS & GYNECOLOGY

## 2018-11-19 PROCEDURE — 700111 HCHG RX REV CODE 636 W/ 250 OVERRIDE (IP): Performed by: OBSTETRICS & GYNECOLOGY

## 2018-11-19 PROCEDURE — 700101 HCHG RX REV CODE 250

## 2018-11-19 PROCEDURE — 700102 HCHG RX REV CODE 250 W/ 637 OVERRIDE(OP): Performed by: OBSTETRICS & GYNECOLOGY

## 2018-11-19 PROCEDURE — 85027 COMPLETE CBC AUTOMATED: CPT

## 2018-11-19 PROCEDURE — 700105 HCHG RX REV CODE 258: Performed by: OBSTETRICS & GYNECOLOGY

## 2018-11-19 PROCEDURE — 0UQMXZZ REPAIR VULVA, EXTERNAL APPROACH: ICD-10-PCS | Performed by: OBSTETRICS & GYNECOLOGY

## 2018-11-19 PROCEDURE — 36415 COLL VENOUS BLD VENIPUNCTURE: CPT

## 2018-11-19 PROCEDURE — A9270 NON-COVERED ITEM OR SERVICE: HCPCS | Performed by: OBSTETRICS & GYNECOLOGY

## 2018-11-19 PROCEDURE — 305385 HCHG SURGICAL SERVICES 1/4 HOUR: Performed by: OBSTETRICS & GYNECOLOGY

## 2018-11-19 PROCEDURE — 85025 COMPLETE CBC W/AUTO DIFF WBC: CPT

## 2018-11-19 PROCEDURE — 306828 HCHG ANES-TIME GENERAL: Performed by: OBSTETRICS & GYNECOLOGY

## 2018-11-19 PROCEDURE — 304964 HCHG RECOVERY ROOM TIME 1HR: Performed by: OBSTETRICS & GYNECOLOGY

## 2018-11-19 RX ORDER — TRANEXAMIC ACID 100 MG/ML
INJECTION, SOLUTION INTRAVENOUS
Status: ACTIVE
Start: 2018-11-19 | End: 2018-11-20

## 2018-11-19 RX ORDER — LOPERAMIDE HYDROCHLORIDE 2 MG/1
2 CAPSULE ORAL ONCE
Status: COMPLETED | OUTPATIENT
Start: 2018-11-19 | End: 2018-11-20

## 2018-11-19 RX ORDER — OXYCODONE HYDROCHLORIDE AND ACETAMINOPHEN 5; 325 MG/1; MG/1
1 TABLET ORAL EVERY 4 HOURS PRN
Status: DISCONTINUED | OUTPATIENT
Start: 2018-11-19 | End: 2018-11-21 | Stop reason: HOSPADM

## 2018-11-19 RX ORDER — ALUMINA, MAGNESIA, AND SIMETHICONE 2400; 2400; 240 MG/30ML; MG/30ML; MG/30ML
30 SUSPENSION ORAL EVERY 6 HOURS PRN
Status: DISCONTINUED | OUTPATIENT
Start: 2018-11-19 | End: 2018-11-20 | Stop reason: HOSPADM

## 2018-11-19 RX ORDER — MISOPROSTOL 200 UG/1
800 TABLET ORAL
Status: COMPLETED | OUTPATIENT
Start: 2018-11-19 | End: 2018-11-19

## 2018-11-19 RX ORDER — ACETAMINOPHEN 325 MG/1
325 TABLET ORAL EVERY 4 HOURS PRN
Status: DISCONTINUED | OUTPATIENT
Start: 2018-11-19 | End: 2018-11-21 | Stop reason: HOSPADM

## 2018-11-19 RX ORDER — ROPIVACAINE HYDROCHLORIDE 2 MG/ML
INJECTION, SOLUTION EPIDURAL; INFILTRATION; PERINEURAL
Status: COMPLETED
Start: 2018-11-19 | End: 2018-11-19

## 2018-11-19 RX ORDER — OXYCODONE AND ACETAMINOPHEN 10; 325 MG/1; MG/1
1 TABLET ORAL EVERY 4 HOURS PRN
Status: DISCONTINUED | OUTPATIENT
Start: 2018-11-19 | End: 2018-11-21 | Stop reason: HOSPADM

## 2018-11-19 RX ORDER — SODIUM CHLORIDE, SODIUM LACTATE, POTASSIUM CHLORIDE, CALCIUM CHLORIDE 600; 310; 30; 20 MG/100ML; MG/100ML; MG/100ML; MG/100ML
INJECTION, SOLUTION INTRAVENOUS CONTINUOUS
Status: DISPENSED | OUTPATIENT
Start: 2018-11-19 | End: 2018-11-20

## 2018-11-19 RX ORDER — CARBOPROST TROMETHAMINE 250 UG/ML
INJECTION, SOLUTION INTRAMUSCULAR
Status: COMPLETED
Start: 2018-11-19 | End: 2018-11-19

## 2018-11-19 RX ORDER — IBUPROFEN 600 MG/1
600 TABLET ORAL EVERY 6 HOURS PRN
Status: DISCONTINUED | OUTPATIENT
Start: 2018-11-19 | End: 2018-11-21 | Stop reason: HOSPADM

## 2018-11-19 RX ORDER — CARBOPROST TROMETHAMINE 250 UG/ML
250 INJECTION, SOLUTION INTRAMUSCULAR ONCE
Status: COMPLETED | OUTPATIENT
Start: 2018-11-19 | End: 2018-11-19

## 2018-11-19 RX ORDER — CEFAZOLIN SODIUM 2 G/100ML
2 INJECTION, SOLUTION INTRAVENOUS ONCE
Status: DISCONTINUED | OUTPATIENT
Start: 2018-11-19 | End: 2018-11-20

## 2018-11-19 RX ORDER — METHYLERGONOVINE MALEATE 0.2 MG/ML
0.2 INJECTION INTRAVENOUS
Status: COMPLETED | OUTPATIENT
Start: 2018-11-19 | End: 2018-11-19

## 2018-11-19 RX ORDER — SODIUM CHLORIDE, SODIUM LACTATE, POTASSIUM CHLORIDE, CALCIUM CHLORIDE 600; 310; 30; 20 MG/100ML; MG/100ML; MG/100ML; MG/100ML
INJECTION, SOLUTION INTRAVENOUS
Status: COMPLETED
Start: 2018-11-19 | End: 2018-11-19

## 2018-11-19 RX ADMIN — IBUPROFEN 600 MG: 600 TABLET, FILM COATED ORAL at 20:45

## 2018-11-19 RX ADMIN — METHYLERGONOVINE MALEATE 0.2 MG: 0.2 INJECTION, SOLUTION INTRAMUSCULAR; INTRAVENOUS at 22:25

## 2018-11-19 RX ADMIN — ROPIVACAINE HYDROCHLORIDE 100 ML: 2 INJECTION, SOLUTION EPIDURAL; INFILTRATION; PERINEURAL at 17:56

## 2018-11-19 RX ADMIN — Medication 2000 ML/HR: at 20:20

## 2018-11-19 RX ADMIN — SODIUM CHLORIDE, POTASSIUM CHLORIDE, SODIUM LACTATE AND CALCIUM CHLORIDE 1000 ML: 600; 310; 30; 20 INJECTION, SOLUTION INTRAVENOUS at 14:20

## 2018-11-19 RX ADMIN — SODIUM CHLORIDE, POTASSIUM CHLORIDE, SODIUM LACTATE AND CALCIUM CHLORIDE: 600; 310; 30; 20 INJECTION, SOLUTION INTRAVENOUS at 18:47

## 2018-11-19 RX ADMIN — SODIUM CHLORIDE, POTASSIUM CHLORIDE, SODIUM LACTATE AND CALCIUM CHLORIDE: 600; 310; 30; 20 INJECTION, SOLUTION INTRAVENOUS at 17:34

## 2018-11-19 RX ADMIN — CARBOPROST TROMETHAMINE 250 MCG: 250 INJECTION, SOLUTION INTRAMUSCULAR at 23:27

## 2018-11-19 RX ADMIN — Medication 125 ML/HR: at 21:05

## 2018-11-19 RX ADMIN — Medication 2 MILLI-UNITS/MIN: at 14:31

## 2018-11-19 RX ADMIN — MISOPROSTOL 800 MCG: 200 TABLET ORAL at 21:44

## 2018-11-19 RX ADMIN — FENTANYL CITRATE 100 MCG: 50 INJECTION, SOLUTION INTRAMUSCULAR; INTRAVENOUS at 23:05

## 2018-11-19 ASSESSMENT — LIFESTYLE VARIABLES
ALCOHOL_USE: NO
EVER_SMOKED: NEVER

## 2018-11-19 ASSESSMENT — PATIENT HEALTH QUESTIONNAIRE - PHQ9
SUM OF ALL RESPONSES TO PHQ9 QUESTIONS 1 AND 2: 0
2. FEELING DOWN, DEPRESSED, IRRITABLE, OR HOPELESS: NOT AT ALL
1. LITTLE INTEREST OR PLEASURE IN DOING THINGS: NOT AT ALL
2. FEELING DOWN, DEPRESSED, IRRITABLE, OR HOPELESS: NOT AT ALL
1. LITTLE INTEREST OR PLEASURE IN DOING THINGS: NOT AT ALL
SUM OF ALL RESPONSES TO PHQ9 QUESTIONS 1 AND 2: 0

## 2018-11-19 ASSESSMENT — COPD QUESTIONNAIRES
COPD SCREENING SCORE: 0
DO YOU EVER COUGH UP ANY MUCUS OR PHLEGM?: NO/ONLY WITH OCCASIONAL COLDS OR INFECTIONS
HAVE YOU SMOKED AT LEAST 100 CIGARETTES IN YOUR ENTIRE LIFE: NO/DON'T KNOW
DURING THE PAST 4 WEEKS HOW MUCH DID YOU FEEL SHORT OF BREATH: NONE/LITTLE OF THE TIME
IN THE PAST 12 MONTHS DO YOU DO LESS THAN YOU USED TO BECAUSE OF YOUR BREATHING PROBLEMS: DISAGREE/UNSURE

## 2018-11-19 NOTE — PROGRESS NOTES
presents at 40.5 wk gestation for UCs q 5 min. Denies VB or LOF; reports good FM. SVE 5/80/-2. Pt is breathing through UCs but not wanting anything for pain at this time. Report given to Dr. Finch; will be in to do bright light examination for herpes lesions. Pt denies symptoms  1232; MD at bedside; SVE. Orders to have pt ambulate for hour  1252: Pt ambulating for hour  1400: Pt placed back on monitors; SVE 6/80/-2; states some UCs hurt and others don't but pt agreeing to pitocin augmentation. Dr. Finch notified; admit orders obtained. Admission procedures completed  1530: Difficult to trace FHTs and UCs d/t pt's obesity and position; prefers leaning over the bed the best; Rn adjusting monitors frequently  1615; Pt c/o back labor; assisted to hands and knees position; unable to trace FHTs despite constant adjustment.   1630: Pt states she does not like this position; wanting to stand again  1650: Pt requesting epidural; bolus started but anesthesiologist in OR so will place when available  1700: LD down to 70s; repositioned on left side and pitocin turned off  1706: Another LD so O2 applied   1752: Dr. Hoffman at bedside to place epidural; tolerated well; negative test dose  1840: Mcmahan placed; SVE 7-8/90/-1. Dr. Finch called and updated on LDs and labor progress; requesting IUPC; will be in shortly.   185: Bedside report given to Khurram JONES

## 2018-11-19 NOTE — CARE PLAN
Problem: Pain  Goal: Alleviation of Pain or a reduction in pain to the patient's comfort goal  Outcome: PROGRESSING AS EXPECTED  Pt desires no epidural but wants IV pain meds when needed; educated about what needs to happen prior to getting epidural. Discussed non pharmacologic techniques such as breathing, moving/reposition changes, etc. Pt is coping well at this time    Problem: Risk for Infection, Impaired Wound Healing  Goal: Remain free from signs and symptoms of infection  Outcome: PROGRESSING AS EXPECTED  No signs of infection; using aseptic technique during vaginal exams

## 2018-11-19 NOTE — H&P
History and Physical;      Terri Alvares is a 38 y.o. year old female  at 40w5d who presents for contractions    Subjective:   positive  For CTXS.   positive Feels pain   negative for LOF  positive and negative for vaginal bleeding.   positive for fetal movement    ROS: A comprehensive review of systems was negative.    Past Medical History:   Diagnosis Date   • GDM (gestational diabetes mellitus) 7/3/2013    denies, states treated r/t previous gastric surgery. NOT current diabetic     Past Surgical History:   Procedure Laterality Date   • GASTRIC BANDING LAPAROSCOPIC      Lost 40#   • EYE SURGERY       OB History    Para Term  AB Living   4 2 2   1 2   SAB TAB Ectopic Molar Multiple Live Births     0     0 2      # Outcome Date GA Lbr Frank/2nd Weight Sex Delivery Anes PTL Lv   4 Current            3 Term 04/10/16 41w0d  2.722 kg (6 lb) F Vag-Spont EPI N ROWAN      Birth Comments: 1 day procedure   2 Term 13 39w0d  3.135 kg (6 lb 14.6 oz) F VAGINAL TREY  N ROWAN   1 AB 98              Birth Comments: System Generated. Please review and update pregnancy details.        Social History     Social History   • Marital status: Single     Spouse name: N/A   • Number of children: N/A   • Years of education: N/A     Occupational History   • Not on file.     Social History Main Topics   • Smoking status: Former Smoker     Types: Cigarettes     Quit date: 2013   • Smokeless tobacco: Never Used      Comment: last used 3/13 due to pregnancy   • Alcohol use No      Comment: occ    • Drug use: No   • Sexual activity: Yes     Partners: Male     Birth control/ protection: Surgical      Comment: Plans BTL.     Other Topics Concern   • Not on file     Social History Narrative   • No narrative on file     Allergies: Patient has no known allergies.    Current Facility-Administered Medications:   •  LACTATED RINGERS IV SOLN, , , ,   •  oxytocin (PITOCIN) 20 UNITS/1000ML LR, , , ,  "    Prenatal care with [facility] with the following problems:  Patient Active Problem List    Diagnosis Date Noted   • UTI in pregnancy, antepartum, third trimester 09/05/2018   • Abnormal AFP3 test - 1:2 risk Trisomy 18, referral placed to Dr Orona.  06/17/2018   • Rubella non-immune  06/14/2018   • HSV infection 06/13/2018   • Supervision of high risk elderly multigravida in third trimester 05/07/2013   • Advanced maternal age in multigravida 05/07/2013   • History of laparoscopic adjustable gastric banding 05/07/2013   • History of depression 05/07/2013   • History of suicide attempt 05/07/2013         Objective:      Height 1.626 m (5' 4\"), weight 107 kg (236 lb), unknown if currently breastfeeding.    General:   no acute distress   Skin:   normal   HEENT:  PERRLA   Lungs:   CTA bilateral   Heart:   brisk carotid upstroke without bruits, peripheral pulses very brisk, chest is clear without rales or wheezing, no pedal edema, no JVD, no hepatosplenomegaly   Abdomen:   gravid, NT   EFW:  3400 g   Pelvis:  Vulva and vagina appear normal. Bimanual exam reveals normal uterus and adnexa., proven to 3400 g   FHTs: 135 BPM good accels no decels good variability   Contractions: 2 contractions in 10 min soft to palpation   Uterine Size: S=D   Presentations: Cephalic per RN   Cervix: Per RN    Dilation:  6 cm    Effacement: 100%    Station:  -1    Consistency: Soft    Position: Middle     Complete OB US  See labs    Lab Review  Lab:   Blood type: O     Recent Results (from the past 5880 hour(s))   POCT Pregnancy    Collection Time: 05/02/18  1:18 PM   Result Value Ref Range    POC Urine Pregnancy Test Positive Negative    Internal Control Positive Positive     Internal Control Negative Negative    CBC WITH DIFFERENTIAL    Collection Time: 06/05/18  9:30 PM   Result Value Ref Range    WBC 9.7 4.8 - 10.8 K/uL    RBC 4.07 (L) 4.20 - 5.40 M/uL    Hemoglobin 12.4 12.0 - 16.0 g/dL    Hematocrit 37.4 37.0 - 47.0 %    MCV 91.9 81.4 - 97.8 " fL    MCH 30.5 27.0 - 33.0 pg    MCHC 33.2 (L) 33.6 - 35.0 g/dL    RDW 46.4 35.9 - 50.0 fL    Platelet Count 179 164 - 446 K/uL    MPV 10.5 9.0 - 12.9 fL    Neutrophils-Polys 66.00 44.00 - 72.00 %    Lymphocytes 25.60 22.00 - 41.00 %    Monocytes 5.50 0.00 - 13.40 %    Eosinophils 1.40 0.00 - 6.90 %    Basophils 0.10 0.00 - 1.80 %    Immature Granulocytes 1.40 (H) 0.00 - 0.90 %    Nucleated RBC 0.00 /100 WBC    Neutrophils (Absolute) 6.42 2.00 - 7.15 K/uL    Lymphs (Absolute) 2.49 1.00 - 4.80 K/uL    Monos (Absolute) 0.54 0.00 - 0.85 K/uL    Eos (Absolute) 0.14 0.00 - 0.51 K/uL    Baso (Absolute) 0.01 0.00 - 0.12 K/uL    Immature Granulocytes (abs) 0.14 (H) 0.00 - 0.11 K/uL    NRBC (Absolute) 0.00 K/uL   COMP METABOLIC PANEL    Collection Time: 06/05/18  9:30 PM   Result Value Ref Range    Sodium 136 135 - 145 mmol/L    Potassium 3.1 (L) 3.6 - 5.5 mmol/L    Chloride 108 96 - 112 mmol/L    Co2 19 (L) 20 - 33 mmol/L    Anion Gap 9.0 0.0 - 11.9    Glucose 106 (H) 65 - 99 mg/dL    Bun 8 8 - 22 mg/dL    Creatinine 0.59 0.50 - 1.40 mg/dL    Calcium 8.9 8.4 - 10.2 mg/dL    AST(SGOT) 52 (H) 12 - 45 U/L    ALT(SGPT) 41 2 - 50 U/L    Alkaline Phosphatase 46 30 - 99 U/L    Total Bilirubin 0.4 0.1 - 1.5 mg/dL    Albumin 3.6 3.2 - 4.9 g/dL    Total Protein 6.9 6.0 - 8.2 g/dL    Globulin 3.3 1.9 - 3.5 g/dL    A-G Ratio 1.1 g/dL   LIPASE    Collection Time: 06/05/18  9:30 PM   Result Value Ref Range    Lipase 25 7 - 58 U/L   ESTIMATED GFR    Collection Time: 06/05/18  9:30 PM   Result Value Ref Range    GFR If African American >60 >60 mL/min/1.73 m 2    GFR If Non African American >60 >60 mL/min/1.73 m 2   POCT Urinalysis    Collection Time: 06/13/18  2:31 PM   Result Value Ref Range    POC Color  Negative    POC Appearance  Negative    POC Leukocyte Esterase Negative Negative    POC Nitrites Negative Negative    POC Urobiligen  Negative (0.2) mg/dL    POC Protein Negative Negative mg/dL    POC Urine PH 6.5 5.0 - 8.0    POC Blood  Trace Negative    POC Specific Gravity 1.020 <1.005 - >1.030    POC Ketones Trace Negative mg/dL    POC Bilirubin  Negative mg/dL    POC Glucose Negative Negative mg/dL   PREG CNTR PRENATAL PN    Collection Time: 06/13/18  2:43 PM   Result Value Ref Range    Color Yellow     Character Turbid (A)     Specific Gravity 1.028 <1.035    Ph 6.5 5.0 - 8.0    Glucose Negative Negative mg/dL    Ketones Trace (A) Negative mg/dL    Protein Negative Negative mg/dL    Bilirubin Negative Negative    Urobilinogen, Urine 0.2 Negative    Nitrite Negative Negative    Leukocyte Esterase Trace (A) Negative    Occult Blood Negative Negative    Micro Urine Req Microscopic     WBC 10.9 (H) 4.8 - 10.8 K/uL    RBC 4.15 (L) 4.20 - 5.40 M/uL    Hemoglobin 12.5 12.0 - 16.0 g/dL    Hematocrit 38.1 37.0 - 47.0 %    MCV 91.8 81.4 - 97.8 fL    MCH 30.1 27.0 - 33.0 pg    MCHC 32.8 (L) 33.6 - 35.0 g/dL    RDW 46.4 35.9 - 50.0 fL    Platelet Count 192 164 - 446 K/uL    MPV 11.0 9.0 - 12.9 fL    Neutrophils-Polys 68.60 44.00 - 72.00 %    Lymphocytes 20.50 (L) 22.00 - 41.00 %    Monocytes 7.80 0.00 - 13.40 %    Eosinophils 1.30 0.00 - 6.90 %    Basophils 0.20 0.00 - 1.80 %    Immature Granulocytes 1.60 (H) 0.00 - 0.90 %    Nucleated RBC 0.00 /100 WBC    Neutrophils (Absolute) 7.49 (H) 2.00 - 7.15 K/uL    Lymphs (Absolute) 2.24 1.00 - 4.80 K/uL    Monos (Absolute) 0.85 0.00 - 0.85 K/uL    Eos (Absolute) 0.14 0.00 - 0.51 K/uL    Baso (Absolute) 0.02 0.00 - 0.12 K/uL    Immature Granulocytes (abs) 0.18 (H) 0.00 - 0.11 K/uL    NRBC (Absolute) 0.00 K/uL    Rubella IgG Antibody 7.10 IU/mL    Syphilis, Treponemal Qual Non Reactive Non Reactive    Hepatitis B Surface Antigen Negative Negative   AFP TETRA    Collection Time: 06/13/18  2:43 PM   Result Value Ref Range    AFP Value -Eia 27 ng/mL    AFP MOM Value 0.80     Ue3 Value 1.25 ng/mL    Ue3 Mom 0.97     Patient's hCG, 2nd Trimester 8354 IU/L    hCG MoM, 2nd Trimester 0.46     Bev Value -Eia 108 pg/mL     Bev Mom Value 0.79     Interpretation Screen Neg (A)     M[age]EE 38.3 yr    Maternal Weight 211.0 lbs.     Gest. Age on Collection Date 18 wks, 0 days     Gestational Age Based On Ultrasound     Multiple Pregnancy Hagen     Race Other     Insulin Dependent Diabetes No     Smoking No     Family Hx NTD No     Family Hx of Aneuploidy No     Specimen See Note     EER Quad, Maternal Serum See Note    HIV AG/AB COMBO ASSAY SCREENING    Collection Time: 06/13/18  2:43 PM   Result Value Ref Range    HIV Ag/Ab Combo Assay Non Reactive Non Reactive   URINE MICROSCOPIC (W/UA)    Collection Time: 06/13/18  2:43 PM   Result Value Ref Range    WBC 2-5 /hpf    RBC 5-10 (A) /hpf    Bacteria Negative None /hpf    Epithelial Cells Few /hpf    Hyaline Cast 3-5 (A) /lpf   THINPREP RFLX HPV ASCUS W/CTNG    Collection Time: 06/13/18  2:43 PM   Result Value Ref Range    Cytology Reg See Path Report     Source Endo/Cervical     C. trachomatis by PCR Negative Negative    N. gonorrhoeae by PCR Negative Negative   OP PRENATAL PANEL-BLOOD BANK    Collection Time: 06/13/18  2:50 PM   Result Value Ref Range    ABO Grouping Only O     Rh Grouping Only POS     Antibody Screen Scrn NEG    HGB    Collection Time: 08/28/18 10:23 AM   Result Value Ref Range    Hemoglobin 13.0 12.0 - 16.0 g/dL   HCT    Collection Time: 08/28/18 10:23 AM   Result Value Ref Range    Hematocrit 41.4 37.0 - 47.0 %   GLUCOSE 1HR GESTATIONAL    Collection Time: 08/28/18 10:23 AM   Result Value Ref Range    Glucose, Post Dose 127 70 - 139 mg/dL   T.PALLIDUM AB EIA    Collection Time: 08/28/18 10:23 AM   Result Value Ref Range    Syphilis, Treponemal Qual Non Reactive Non Reactive   POCT Urinalysis    Collection Time: 09/05/18  4:48 PM   Result Value Ref Range    POC Color Yellow Negative    POC Appearance Clear Negative    POC Leukocyte Esterase Negative Negative    POC Nitrites Negative Negative    POC Urobiligen  Negative (0.2) mg/dL    POC Protein Trace  Negative mg/dL    POC Urine PH 6.0 5.0 - 8.0    POC Blood Trace Negative    POC Specific Gravity 1.025 <1.005 - >1.030    POC Ketones Negative Negative mg/dL    POC Bilirubin  Negative mg/dL    POC Glucose Negative Negative mg/dL   POCT Urinalysis    Collection Time: 09/19/18  4:13 PM   Result Value Ref Range    POC Color dark yellow Negative    POC Appearance  Negative    POC Leukocyte Esterase negative Negative    POC Nitrites negative Negative    POC Urobiligen  Negative (0.2) mg/dL    POC Protein trace Negative mg/dL    POC Urine PH 6.0 5.0 - 8.0    POC Blood trace-lysed Negative    POC Specific Gravity 1.030 <1.005 - >1.030    POC Ketones trace Negative mg/dL    POC Bilirubin  Negative mg/dL    POC Glucose negative Negative mg/dL   URINE CULTURE(NEW)    Collection Time: 09/19/18  4:15 PM   Result Value Ref Range    Significant Indicator NEG     Source UR     Site      Urine Culture Mixed skin soraya ,000 cfu/mL    POCT Fetal Nonstress Test    Collection Time: 10/04/18  4:57 PM   Result Value Ref Range    NST Indications decreased FM     NST Baseline 135     NST Uterine Activity none     NST Acoustic Stimulation water     NST Assessment category 1     NST Action Necessary water     NST Other Data       15x15 accels, moderate variability, pt feeling fm accurately    NST Return no     NST Read By keara    POCT Urinalysis    Collection Time: 10/19/18 11:20 AM   Result Value Ref Range    POC Color  Negative    POC Appearance  Negative    POC Leukocyte Esterase Negative Negative    POC Nitrites Negative Negative    POC Urobiligen  Negative (0.2) mg/dL    POC Protein Negative Negative mg/dL    POC Urine PH 6.0 5.0 - 8.0    POC Blood Small Negative    POC Specific Gravity 1.025 <1.005 - >1.030    POC Ketones Negative Negative mg/dL    POC Bilirubin  Negative mg/dL    POC Glucose Negative Negative mg/dL   GRP B STREP, BY PCR (YO BROTH)    Collection Time: 10/19/18 11:20 AM   Result Value Ref Range    Strep Gp B  DNA PCR Negative Negative   FERN TEST    Collection Time: 11/09/18  7:50 PM   Result Value Ref Range    Fern Test On Amniotic Fluid see below Not present        Assessment:   1.  IUP at 40w5d  2.  Labor status: Early latent labor.  3.  Cat 1 FHTs  4.  Obstetrical history significant for   Patient Active Problem List    Diagnosis Date Noted   • UTI in pregnancy, antepartum, third trimester 09/05/2018   • Abnormal AFP3 test - 1:2 risk Trisomy 18, referral placed to Dr Orona.  06/17/2018   • Rubella non-immune  06/14/2018   • HSV infection 06/13/2018   • Supervision of high risk elderly multigravida in third trimester 05/07/2013   • Advanced maternal age in multigravida 05/07/2013   • History of laparoscopic adjustable gastric banding 05/07/2013   • History of depression 05/07/2013   • History of suicide attempt 05/07/2013   .      Plan:     Admit to L&D  GBS negative  Routine labs  Pain management prn  Patient has questionable history of herpes during last pregnancy-only episode by history the patient denies any normal symptoms-bright light examination performed by myself shows no active lesions

## 2018-11-20 LAB
ABO GROUP BLD: NORMAL
ACTION RANGE TRIGGERED IACRT: YES
BARCODED ABORH UBTYP: 6200
BARCODED PRD CODE UBPRD: NORMAL
BARCODED UNIT NUM UBUNT: NORMAL
BASE EXCESS BLDA CALC-SCNC: -2 MMOL/L (ref -4–3)
BASOPHILS # BLD AUTO: 0.1 % (ref 0–1.8)
BASOPHILS # BLD AUTO: 0.2 % (ref 0–1.8)
BASOPHILS # BLD: 0.01 K/UL (ref 0–0.12)
BASOPHILS # BLD: 0.03 K/UL (ref 0–0.12)
BLD GP AB SCN SERPL QL: NORMAL
BODY TEMPERATURE: ABNORMAL DEGREES
CA-I BLD ISE-SCNC: 1.15 MMOL/L (ref 1.1–1.3)
CO2 BLDA-SCNC: 24 MMOL/L (ref 20–33)
COMPONENT FT 8504FT: NORMAL
COMPONENT P 8504P: NORMAL
COMPONENT R 8504R: NORMAL
EOSINOPHIL # BLD AUTO: 0 K/UL (ref 0–0.51)
EOSINOPHIL # BLD AUTO: 0.06 K/UL (ref 0–0.51)
EOSINOPHIL NFR BLD: 0 % (ref 0–6.9)
EOSINOPHIL NFR BLD: 0.6 % (ref 0–6.9)
ERYTHROCYTE [DISTWIDTH] IN BLOOD BY AUTOMATED COUNT: 48.1 FL (ref 35.9–50)
ERYTHROCYTE [DISTWIDTH] IN BLOOD BY AUTOMATED COUNT: 49 FL (ref 35.9–50)
FIBRINOGEN PPP-MCNC: 365 MG/DL (ref 215–460)
HCO3 BLDA-SCNC: 22.9 MMOL/L (ref 17–25)
HCT VFR BLD AUTO: 29.3 % (ref 37–47)
HCT VFR BLD AUTO: 32.4 % (ref 37–47)
HCT VFR BLD CALC: 29 % (ref 37–47)
HGB BLD-MCNC: 10.5 G/DL (ref 12–16)
HGB BLD-MCNC: 9.7 G/DL (ref 12–16)
HGB BLD-MCNC: 9.9 G/DL (ref 12–16)
HOROWITZ INDEX BLDA+IHG-RTO: 230 MM[HG]
IMM GRANULOCYTES # BLD AUTO: 0.09 K/UL (ref 0–0.11)
IMM GRANULOCYTES # BLD AUTO: 0.16 K/UL (ref 0–0.11)
IMM GRANULOCYTES NFR BLD AUTO: 0.9 % (ref 0–0.9)
IMM GRANULOCYTES NFR BLD AUTO: 1 % (ref 0–0.9)
INR PPP: 1.08 (ref 0.87–1.13)
INST. QUALIFIED PATIENT IIQPT: YES
LYMPHOCYTES # BLD AUTO: 1.44 K/UL (ref 1–4.8)
LYMPHOCYTES # BLD AUTO: 1.87 K/UL (ref 1–4.8)
LYMPHOCYTES NFR BLD: 19.8 % (ref 22–41)
LYMPHOCYTES NFR BLD: 8 % (ref 22–41)
MCH RBC QN AUTO: 29.7 PG (ref 27–33)
MCH RBC QN AUTO: 29.8 PG (ref 27–33)
MCHC RBC AUTO-ENTMCNC: 32.4 G/DL (ref 33.6–35)
MCHC RBC AUTO-ENTMCNC: 33.1 G/DL (ref 33.6–35)
MCV RBC AUTO: 90.2 FL (ref 81.4–97.8)
MCV RBC AUTO: 91.5 FL (ref 81.4–97.8)
MONOCYTES # BLD AUTO: 0.38 K/UL (ref 0–0.85)
MONOCYTES # BLD AUTO: 0.46 K/UL (ref 0–0.85)
MONOCYTES NFR BLD AUTO: 2.1 % (ref 0–13.4)
MONOCYTES NFR BLD AUTO: 4.9 % (ref 0–13.4)
NEUTROPHILS # BLD AUTO: 16.01 K/UL (ref 2–7.15)
NEUTROPHILS # BLD AUTO: 6.97 K/UL (ref 2–7.15)
NEUTROPHILS NFR BLD: 73.6 % (ref 44–72)
NEUTROPHILS NFR BLD: 88.8 % (ref 44–72)
NRBC # BLD AUTO: 0 K/UL
NRBC # BLD AUTO: 0 K/UL
NRBC BLD-RTO: 0 /100 WBC
NRBC BLD-RTO: 0 /100 WBC
O2/TOTAL GAS SETTING VFR VENT: 100 %
PATHOLOGY CONSULT NOTE: NORMAL
PCO2 BLDA: 39.2 MMHG (ref 26–37)
PCO2 TEMP ADJ BLDA: 39.2 MMHG (ref 26–37)
PH BLDA: 7.37 [PH] (ref 7.4–7.5)
PH TEMP ADJ BLDA: 7.37 [PH] (ref 7.4–7.5)
PLATELET # BLD AUTO: 158 K/UL (ref 164–446)
PLATELET # BLD AUTO: 159 K/UL (ref 164–446)
PMV BLD AUTO: 11.4 FL (ref 9–12.9)
PMV BLD AUTO: 11.4 FL (ref 9–12.9)
PO2 BLDA: 230 MMHG (ref 64–87)
PO2 TEMP ADJ BLDA: 230 MMHG (ref 64–87)
POTASSIUM BLD-SCNC: 3.7 MMOL/L (ref 3.6–5.5)
PRODUCT TYPE UPROD: NORMAL
PROTHROMBIN TIME: 14.1 SEC (ref 12–14.6)
RBC # BLD AUTO: 3.25 M/UL (ref 4.2–5.4)
RBC # BLD AUTO: 3.54 M/UL (ref 4.2–5.4)
RH BLD: NORMAL
SAO2 % BLDA: 100 % (ref 93–99)
SODIUM BLD-SCNC: 139 MMOL/L (ref 135–145)
SPECIMEN DRAWN FROM PATIENT: ABNORMAL
UNIT STATUS USTAT: NORMAL
WBC # BLD AUTO: 18 K/UL (ref 4.8–10.8)
WBC # BLD AUTO: 9.5 K/UL (ref 4.8–10.8)

## 2018-11-20 PROCEDURE — 90707 MMR VACCINE SC: CPT | Performed by: OBSTETRICS & GYNECOLOGY

## 2018-11-20 PROCEDURE — 85014 HEMATOCRIT: CPT

## 2018-11-20 PROCEDURE — 86900 BLOOD TYPING SEROLOGIC ABO: CPT

## 2018-11-20 PROCEDURE — 36415 COLL VENOUS BLD VENIPUNCTURE: CPT

## 2018-11-20 PROCEDURE — 700101 HCHG RX REV CODE 250

## 2018-11-20 PROCEDURE — 86901 BLOOD TYPING SEROLOGIC RH(D): CPT

## 2018-11-20 PROCEDURE — 30233N1 TRANSFUSION OF NONAUTOLOGOUS RED BLOOD CELLS INTO PERIPHERAL VEIN, PERCUTANEOUS APPROACH: ICD-10-PCS | Performed by: OBSTETRICS & GYNECOLOGY

## 2018-11-20 PROCEDURE — 700102 HCHG RX REV CODE 250 W/ 637 OVERRIDE(OP)

## 2018-11-20 PROCEDURE — 700111 HCHG RX REV CODE 636 W/ 250 OVERRIDE (IP): Performed by: ANESTHESIOLOGY

## 2018-11-20 PROCEDURE — 84295 ASSAY OF SERUM SODIUM: CPT

## 2018-11-20 PROCEDURE — 82803 BLOOD GASES ANY COMBINATION: CPT

## 2018-11-20 PROCEDURE — 10D17Z9 MANUAL EXTRACTION OF PRODUCTS OF CONCEPTION, RETAINED, VIA NATURAL OR ARTIFICIAL OPENING: ICD-10-PCS | Performed by: OBSTETRICS & GYNECOLOGY

## 2018-11-20 PROCEDURE — 303615 HCHG EPIDURAL/SPINAL ANESTHESIA FOR LABOR

## 2018-11-20 PROCEDURE — 700111 HCHG RX REV CODE 636 W/ 250 OVERRIDE (IP)

## 2018-11-20 PROCEDURE — 0W3R7ZZ CONTROL BLEEDING IN GENITOURINARY TRACT, VIA NATURAL OR ARTIFICIAL OPENING: ICD-10-PCS | Performed by: OBSTETRICS & GYNECOLOGY

## 2018-11-20 PROCEDURE — A9270 NON-COVERED ITEM OR SERVICE: HCPCS | Performed by: OBSTETRICS & GYNECOLOGY

## 2018-11-20 PROCEDURE — 700102 HCHG RX REV CODE 250 W/ 637 OVERRIDE(OP): Performed by: OBSTETRICS & GYNECOLOGY

## 2018-11-20 PROCEDURE — 770002 HCHG ROOM/CARE - OB PRIVATE (112)

## 2018-11-20 PROCEDURE — A9270 NON-COVERED ITEM OR SERVICE: HCPCS | Performed by: NURSE PRACTITIONER

## 2018-11-20 PROCEDURE — 30233K1 TRANSFUSION OF NONAUTOLOGOUS FROZEN PLASMA INTO PERIPHERAL VEIN, PERCUTANEOUS APPROACH: ICD-10-PCS | Performed by: OBSTETRICS & GYNECOLOGY

## 2018-11-20 PROCEDURE — 90471 IMMUNIZATION ADMIN: CPT

## 2018-11-20 PROCEDURE — 85025 COMPLETE CBC W/AUTO DIFF WBC: CPT

## 2018-11-20 PROCEDURE — 3E0134Z INTRODUCTION OF SERUM, TOXOID AND VACCINE INTO SUBCUTANEOUS TISSUE, PERCUTANEOUS APPROACH: ICD-10-PCS | Performed by: OBSTETRICS & GYNECOLOGY

## 2018-11-20 PROCEDURE — 85384 FIBRINOGEN ACTIVITY: CPT

## 2018-11-20 PROCEDURE — 700102 HCHG RX REV CODE 250 W/ 637 OVERRIDE(OP): Performed by: NURSE PRACTITIONER

## 2018-11-20 PROCEDURE — A9270 NON-COVERED ITEM OR SERVICE: HCPCS

## 2018-11-20 PROCEDURE — 700111 HCHG RX REV CODE 636 W/ 250 OVERRIDE (IP): Performed by: OBSTETRICS & GYNECOLOGY

## 2018-11-20 PROCEDURE — 500726 HCHG BAKRI TAPENADE BALLOON

## 2018-11-20 PROCEDURE — 85610 PROTHROMBIN TIME: CPT

## 2018-11-20 PROCEDURE — 86850 RBC ANTIBODY SCREEN: CPT

## 2018-11-20 PROCEDURE — 88305 TISSUE EXAM BY PATHOLOGIST: CPT

## 2018-11-20 PROCEDURE — 30233R1 TRANSFUSION OF NONAUTOLOGOUS PLATELETS INTO PERIPHERAL VEIN, PERCUTANEOUS APPROACH: ICD-10-PCS | Performed by: OBSTETRICS & GYNECOLOGY

## 2018-11-20 PROCEDURE — 700105 HCHG RX REV CODE 258: Performed by: OBSTETRICS & GYNECOLOGY

## 2018-11-20 PROCEDURE — 59160 D & C AFTER DELIVERY: CPT

## 2018-11-20 PROCEDURE — 82330 ASSAY OF CALCIUM: CPT

## 2018-11-20 PROCEDURE — 84132 ASSAY OF SERUM POTASSIUM: CPT

## 2018-11-20 RX ORDER — IBUPROFEN 800 MG/1
800 TABLET ORAL EVERY 6 HOURS PRN
Status: DISCONTINUED | OUTPATIENT
Start: 2018-11-20 | End: 2018-11-21 | Stop reason: HOSPADM

## 2018-11-20 RX ORDER — SODIUM CHLORIDE 9 MG/ML
INJECTION, SOLUTION INTRAVENOUS CONTINUOUS
Status: DISCONTINUED | OUTPATIENT
Start: 2018-11-20 | End: 2018-11-21 | Stop reason: HOSPADM

## 2018-11-20 RX ORDER — SODIUM CHLORIDE, SODIUM LACTATE, POTASSIUM CHLORIDE, CALCIUM CHLORIDE 600; 310; 30; 20 MG/100ML; MG/100ML; MG/100ML; MG/100ML
INJECTION, SOLUTION INTRAVENOUS PRN
Status: DISCONTINUED | OUTPATIENT
Start: 2018-11-20 | End: 2018-11-21 | Stop reason: HOSPADM

## 2018-11-20 RX ORDER — SODIUM CHLORIDE, SODIUM LACTATE, POTASSIUM CHLORIDE, AND CALCIUM CHLORIDE .6; .31; .03; .02 G/100ML; G/100ML; G/100ML; G/100ML
250 INJECTION, SOLUTION INTRAVENOUS PRN
Status: DISCONTINUED | OUTPATIENT
Start: 2018-11-20 | End: 2018-11-20

## 2018-11-20 RX ORDER — ROPIVACAINE HYDROCHLORIDE 2 MG/ML
INJECTION, SOLUTION EPIDURAL; INFILTRATION; PERINEURAL CONTINUOUS
Status: DISCONTINUED | OUTPATIENT
Start: 2018-11-20 | End: 2018-11-20

## 2018-11-20 RX ORDER — METHYLERGONOVINE MALEATE 0.2 MG/1
0.2 TABLET ORAL EVERY 6 HOURS
Status: DISCONTINUED | OUTPATIENT
Start: 2018-11-20 | End: 2018-11-20 | Stop reason: HOSPADM

## 2018-11-20 RX ORDER — DOCUSATE SODIUM 100 MG/1
100 CAPSULE, LIQUID FILLED ORAL 2 TIMES DAILY PRN
Status: DISCONTINUED | OUTPATIENT
Start: 2018-11-20 | End: 2018-11-21 | Stop reason: HOSPADM

## 2018-11-20 RX ORDER — OXYCODONE HYDROCHLORIDE AND ACETAMINOPHEN 5; 325 MG/1; MG/1
1 TABLET ORAL EVERY 4 HOURS PRN
Status: DISCONTINUED | OUTPATIENT
Start: 2018-11-20 | End: 2018-11-21 | Stop reason: HOSPADM

## 2018-11-20 RX ORDER — MORPHINE SULFATE 10 MG/ML
2 INJECTION, SOLUTION INTRAMUSCULAR; INTRAVENOUS
Status: DISCONTINUED | OUTPATIENT
Start: 2018-11-20 | End: 2018-11-21 | Stop reason: HOSPADM

## 2018-11-20 RX ORDER — MISOPROSTOL 200 UG/1
600 TABLET ORAL
Status: DISCONTINUED | OUTPATIENT
Start: 2018-11-20 | End: 2018-11-21 | Stop reason: HOSPADM

## 2018-11-20 RX ORDER — CARBOPROST TROMETHAMINE 250 UG/ML
INJECTION, SOLUTION INTRAMUSCULAR
Status: ACTIVE
Start: 2018-11-20 | End: 2018-11-20

## 2018-11-20 RX ORDER — VITAMIN A ACETATE, BETA CAROTENE, ASCORBIC ACID, CHOLECALCIFEROL, .ALPHA.-TOCOPHEROL ACETATE, DL-, THIAMINE MONONITRATE, RIBOFLAVIN, NIACINAMIDE, PYRIDOXINE HYDROCHLORIDE, FOLIC ACID, CYANOCOBALAMIN, CALCIUM CARBONATE, FERROUS FUMARATE, ZINC OXIDE, CUPRIC OXIDE 3080; 12; 120; 400; 1; 1.84; 3; 20; 22; 920; 25; 200; 27; 10; 2 [IU]/1; UG/1; MG/1; [IU]/1; MG/1; MG/1; MG/1; MG/1; MG/1; [IU]/1; MG/1; MG/1; MG/1; MG/1; MG/1
1 TABLET, FILM COATED ORAL EVERY MORNING
Status: DISCONTINUED | OUTPATIENT
Start: 2018-11-20 | End: 2018-11-21 | Stop reason: HOSPADM

## 2018-11-20 RX ORDER — ONDANSETRON 2 MG/ML
4 INJECTION INTRAMUSCULAR; INTRAVENOUS EVERY 4 HOURS PRN
Status: DISCONTINUED | OUTPATIENT
Start: 2018-11-20 | End: 2018-11-21 | Stop reason: HOSPADM

## 2018-11-20 RX ORDER — SODIUM CHLORIDE, SODIUM LACTATE, POTASSIUM CHLORIDE, AND CALCIUM CHLORIDE .6; .31; .03; .02 G/100ML; G/100ML; G/100ML; G/100ML
1000 INJECTION, SOLUTION INTRAVENOUS
Status: DISCONTINUED | OUTPATIENT
Start: 2018-11-20 | End: 2018-11-20

## 2018-11-20 RX ORDER — BISACODYL 10 MG
10 SUPPOSITORY, RECTAL RECTAL PRN
Status: DISCONTINUED | OUTPATIENT
Start: 2018-11-20 | End: 2018-11-21 | Stop reason: HOSPADM

## 2018-11-20 RX ORDER — MISOPROSTOL 200 UG/1
800 TABLET ORAL ONCE
Status: COMPLETED | OUTPATIENT
Start: 2018-11-20 | End: 2018-11-20

## 2018-11-20 RX ORDER — LOPERAMIDE HYDROCHLORIDE 2 MG/1
2 CAPSULE ORAL ONCE
Status: COMPLETED | OUTPATIENT
Start: 2018-11-20 | End: 2018-11-20

## 2018-11-20 RX ORDER — MISOPROSTOL 200 UG/1
TABLET ORAL
Status: COMPLETED
Start: 2018-11-20 | End: 2018-11-20

## 2018-11-20 RX ADMIN — OXYCODONE HYDROCHLORIDE AND ACETAMINOPHEN 1 TABLET: 10; 325 TABLET ORAL at 06:37

## 2018-11-20 RX ADMIN — ONDANSETRON 4 MG: 2 INJECTION INTRAMUSCULAR; INTRAVENOUS at 01:28

## 2018-11-20 RX ADMIN — AMPICILLIN SODIUM AND SULBACTAM SODIUM 3 G: 2; 1 INJECTION, POWDER, FOR SOLUTION INTRAMUSCULAR; INTRAVENOUS at 08:14

## 2018-11-20 RX ADMIN — MISOPROSTOL 800 MCG: 200 TABLET ORAL at 01:42

## 2018-11-20 RX ADMIN — METHYLERGONOVINE MALEATE 0.2 MG: 0.2 TABLET ORAL at 08:15

## 2018-11-20 RX ADMIN — LOPERAMIDE HYDROCHLORIDE 2 MG: 2 CAPSULE ORAL at 06:36

## 2018-11-20 RX ADMIN — LOPERAMIDE HYDROCHLORIDE 2 MG: 2 CAPSULE ORAL at 01:32

## 2018-11-20 RX ADMIN — Medication 1 TABLET: at 08:14

## 2018-11-20 RX ADMIN — IBUPROFEN 800 MG: 800 TABLET, FILM COATED ORAL at 11:19

## 2018-11-20 RX ADMIN — Medication 125 ML/HR: at 00:58

## 2018-11-20 RX ADMIN — IBUPROFEN 800 MG: 800 TABLET, FILM COATED ORAL at 21:40

## 2018-11-20 RX ADMIN — AMPICILLIN SODIUM AND SULBACTAM SODIUM 3 G: 2; 1 INJECTION, POWDER, FOR SOLUTION INTRAMUSCULAR; INTRAVENOUS at 01:38

## 2018-11-20 RX ADMIN — OXYCODONE HYDROCHLORIDE AND ACETAMINOPHEN 1 TABLET: 10; 325 TABLET ORAL at 02:33

## 2018-11-20 RX ADMIN — MEASLES, MUMPS, AND RUBELLA VIRUS VACCINE LIVE 0.5 ML: 1000; 12500; 1000 INJECTION, POWDER, LYOPHILIZED, FOR SUSPENSION SUBCUTANEOUS at 21:30

## 2018-11-20 ASSESSMENT — PAIN SCALES - GENERAL
PAINLEVEL_OUTOF10: 3
PAINLEVEL_OUTOF10: 2
PAINLEVEL_OUTOF10: 6
PAINLEVEL_OUTOF10: 5
PAINLEVEL_OUTOF10: 2

## 2018-11-20 ASSESSMENT — EDINBURGH POSTNATAL DEPRESSION SCALE (EPDS)
THINGS HAVE BEEN GETTING ON TOP OF ME: NO, MOST OF THE TIME I HAVE COPED QUITE WELL
THE THOUGHT OF HARMING MYSELF HAS OCCURRED TO ME: NEVER
I HAVE LOOKED FORWARD WITH ENJOYMENT TO THINGS: AS MUCH AS I EVER DID
I HAVE BEEN ABLE TO LAUGH AND SEE THE FUNNY SIDE OF THINGS: AS MUCH AS I ALWAYS COULD
I HAVE FELT SAD OR MISERABLE: NOT VERY OFTEN
I HAVE BEEN SO UNHAPPY THAT I HAVE BEEN CRYING: NO, NEVER
I HAVE BEEN ANXIOUS OR WORRIED FOR NO GOOD REASON: YES, SOMETIMES
I HAVE BEEN SO UNHAPPY THAT I HAVE HAD DIFFICULTY SLEEPING: NOT AT ALL
I HAVE FELT SCARED OR PANICKY FOR NO GOOD REASON: NO, NOT MUCH
I HAVE BLAMED MYSELF UNNECESSARILY WHEN THINGS WENT WRONG: NOT VERY OFTEN

## 2018-11-20 ASSESSMENT — PATIENT HEALTH QUESTIONNAIRE - PHQ9
SUM OF ALL RESPONSES TO PHQ9 QUESTIONS 1 AND 2: 0
1. LITTLE INTEREST OR PLEASURE IN DOING THINGS: NOT AT ALL
2. FEELING DOWN, DEPRESSED, IRRITABLE, OR HOPELESS: NOT AT ALL

## 2018-11-20 NOTE — CARE PLAN
Problem: Safety  Goal: Will remain free from injury    Intervention: Provide assistance with mobility  Call light within reach, treaded socks in place, bed in lowest position and locked.  Hourly rounding in progress.       Problem: Pain Management  Goal: Pain level will decrease to patient's comfort goal  Pt denies pain at this time

## 2018-11-20 NOTE — OR SURGEON
Immediate Post OP Note    PreOp Diagnosis: postpartum hemorrhage    PostOp Diagnosis: same        D and C, placement on intrauterine balloon    Anesthesiologist/Type of Anesthesia:  Dr. Hoffman.    Surgical Staff:  * Surgery not found *    Specimens removed if any:  Uterine curettings    Estimated Blood Loss: 1000 mL prior to surgery. 1000 mL intraop    Findings: significant lower uterine segment atony, no cervical lacerations    Complications: none        11/20/2018 12:57 AM Jatin Stapleton M.D.

## 2018-11-20 NOTE — PROGRESS NOTES
Bedside report from Noc shift RN plan of care discussed and cont. Pt sleeping off and on, still tired from last noc. Stable now scant bleeding noted on pad.  in to assess pt . Labs reviewed  Martir D/Gerard last dose of antibiotics in and last bag of pitocin infusing. Breakfast delivered. And AM meds given as well as pain medication. Pt feeling tired but ok to be transferred up to  after breakfast and am care given. Will transfer per W/C with all belongings and  at side.

## 2018-11-20 NOTE — L&D DELIVERY NOTE
Patient is a 38 yoa G 4 P 2012 at 40 5/7 week gestation. Admit for active labor. GBS negative. Adequate epidural anesthesia. Became completely dilatated, pushed effectively an delivered a viable female infant through a nuchal body cord in a ROP position at 2018. Baby placed on maternal abdomen for drying, suctioning and stimulation. Placenta S&I in a prince presentation at 20213. Fundus massaged to firm, pitocin rapid IV. One interrupted suture placed periurethral and one placed vaginally with excellent hemostasis. Apgars 8&9. Weight pending for maternal bonding.  cc.  Mother and baby are stable. Baby is in room with mother. Anticipate normal pp course.     Dr. Stapleton attending and was present for delivery.

## 2018-11-20 NOTE — PROGRESS NOTES
-Report received from KVNG Branham RN pt resting in bed, POC discussed, pt verbalized understanding.   -Dr. Stapleton at bedside for AROM-cl. IUPC and FSE placed. SVE -1953-HU Leyva at bedside to evaluate FHT. SVE-Lip  -HU Leyva at bedside SVE-C/pushing  -, viable female per HU Leyva, apgars 8/9, infant skin to skin with mom.   -HU Leyva notifed of pts bleeding after cytotec, new order to give methergine.   -HU Leyva at bedside to evaluate pt's bleeding. Bimanual examination performed.   -Dr. Stapleton and HU Leyva remains at bedside to evaluate bleeding. New orders given, see MAR.  -Pt transferred to OR3 for D&C and Bakri Balloon placement.   48-Pt transferred to S234 for recovery.   100-Bakri balloon found in pt's bed, Dr. Stapleton notified and at bedside. Will continue to monitor bleeding. Bakri balloon discarded.   633-Placenta fragments sent in [facility]ish in pathology notified.   700-Report given to ROBERT Esqueda

## 2018-11-20 NOTE — PROGRESS NOTES
Pt out of OR. Balloon spontaneously expelled.  No VB noted.  Fundus firm    Will monitor.  If VB re-starts, will need aggressive OR management. Possible hysterectomy

## 2018-11-20 NOTE — PROGRESS NOTES
Assessment completed.  Pt AAOx4.  Pt has no comlaints of pain at this time.  No other s/s of discomfort or distress. Call light within reach and staff numbers provided.  Pt needs met at this time.

## 2018-11-21 VITALS
TEMPERATURE: 97.9 F | WEIGHT: 236 LBS | RESPIRATION RATE: 18 BRPM | BODY MASS INDEX: 40.29 KG/M2 | DIASTOLIC BLOOD PRESSURE: 75 MMHG | HEIGHT: 64 IN | HEART RATE: 66 BPM | SYSTOLIC BLOOD PRESSURE: 120 MMHG | OXYGEN SATURATION: 97 %

## 2018-11-21 LAB
ERYTHROCYTE [DISTWIDTH] IN BLOOD BY AUTOMATED COUNT: 51.6 FL (ref 35.9–50)
HCT VFR BLD AUTO: 22.1 % (ref 37–47)
HGB BLD-MCNC: 7 G/DL (ref 12–16)
MCH RBC QN AUTO: 29.9 PG (ref 27–33)
MCHC RBC AUTO-ENTMCNC: 31.7 G/DL (ref 33.6–35)
MCV RBC AUTO: 94.4 FL (ref 81.4–97.8)
PLATELET # BLD AUTO: 138 K/UL (ref 164–446)
PMV BLD AUTO: 11.1 FL (ref 9–12.9)
RBC # BLD AUTO: 2.34 M/UL (ref 4.2–5.4)
WBC # BLD AUTO: 9.9 K/UL (ref 4.8–10.8)

## 2018-11-21 PROCEDURE — 36415 COLL VENOUS BLD VENIPUNCTURE: CPT

## 2018-11-21 PROCEDURE — 85027 COMPLETE CBC AUTOMATED: CPT

## 2018-11-21 PROCEDURE — A9270 NON-COVERED ITEM OR SERVICE: HCPCS | Performed by: NURSE PRACTITIONER

## 2018-11-21 PROCEDURE — 700102 HCHG RX REV CODE 250 W/ 637 OVERRIDE(OP): Performed by: NURSE PRACTITIONER

## 2018-11-21 RX ORDER — IBUPROFEN 800 MG/1
800 TABLET ORAL EVERY 8 HOURS PRN
Qty: 30 TAB | Refills: 1 | Status: SHIPPED | OUTPATIENT
Start: 2018-11-21 | End: 2019-02-08

## 2018-11-21 RX ADMIN — IBUPROFEN 800 MG: 800 TABLET, FILM COATED ORAL at 06:30

## 2018-11-21 RX ADMIN — Medication 1 TABLET: at 06:30

## 2018-11-21 ASSESSMENT — PAIN SCALES - GENERAL
PAINLEVEL_OUTOF10: 0
PAINLEVEL_OUTOF10: 4

## 2018-11-21 NOTE — PROGRESS NOTES
Discharge instruction for mom and baby discussed. Explained where to get her prescription and how to take it.  Questions and concerns have been answered. Sleep sack given.

## 2018-11-21 NOTE — DISCHARGE SUMMARY
Discharge Summary      Date of Admission: 2018  Date of Discharge: 2018    Admission Dx: Pregnancy  Labor and delivery, indication for care,   Patient Active Problem List    Diagnosis Date Noted   • UTI in pregnancy, antepartum, third trimester 2018   • Abnormal AFP3 test - 1:2 risk Trisomy 18, referral placed to Dr Orona.  2018   • Rubella non-immune  2018   • HSV infection 2018   • Supervision of high risk elderly multigravida in third trimester 2013   • Advanced maternal age in multigravida 2013   • History of laparoscopic adjustable gastric banding 2013   • History of depression 2013   • History of suicide attempt 2013      Discharge Dx: S/P spontaneous vaginal delivery    Delivery Date: 18    Past Medical History:   Diagnosis Date   • GDM (gestational diabetes mellitus) 7/3/2013    denies, states treated r/t previous gastric surgery. NOT current diabetic   • Psychiatric problem     depression     Past Surgical History:   Procedure Laterality Date   • DILATION AND CURETTAGE  2018    Procedure: DILATION AND CURETTAGE;  Surgeon: Jatin Stapleton M.D.;  Location: LABOR AND DELIVERY;  Service: Obstetrics   • GASTRIC BANDING LAPAROSCOPIC      Lost 40#   • EYE SURGERY       OB History    Para Term  AB Living   4 3 3   1 3   SAB TAB Ectopic Molar Multiple Live Births     0     0 3      # Outcome Date GA Lbr Frank/2nd Weight Sex Delivery Anes PTL Lv   4 Term 18 40w5d / 00:08 2.985 kg (6 lb 9.3 oz) F Vag-Spont EPI N ROWAN   3 Term 04/10/16 41w0d  2.722 kg (6 lb) F Vag-Spont EPI N ROWAN      Birth Comments: 1 day procedure   2 Term 13 39w0d  3.135 kg (6 lb 14.6 oz) F VAGINAL TREY  N ROWAN   1 AB 98              Birth Comments: System Generated. Please review and update pregnancy details.        Allergies: Patient has no known allergies.    Hospital Course:   38 y.o. , now para3, was admitted with the  above mentioned diagnosis, underwent vaginal, spontaneous. Patient postpartum course was unremarkable, with progressive advancement in diet, ambulation and toleration of oral analgesia. Patient without complaints today and desires discharge.     Vitals:    11/20/18 0809 11/20/18 1120 11/20/18 1600 11/20/18 2000   BP: 124/66 113/52 128/74 130/74   Pulse: 64 63 78 91   Resp: 16 16 20 18   Temp: 36 °C (96.8 °F) 37.1 °C (98.8 °F) 36.4 °C (97.5 °F) 37.4 °C (99.4 °F)   TempSrc: Temporal Temporal Oral Temporal   SpO2:    97%   Weight:       Height:         Exam:  Breast Exam: Tenderness .no, Engourgement .yes, Mastitis .no  Abdomen soft, non-tender. BS normal. No masses,  No organomegaly  Incisionnone  Fundus Tenderness: no, Below umbilicus: Yes,   Lochia normal  perineum intact  ExtremitiesNormal    Meds:   No current facility-administered medications on file prior to encounter.      Current Outpatient Prescriptions on File Prior to Encounter   Medication Sig Dispense Refill   • acyclovir (ZOVIRAX) 400 MG tablet Take 1 Tab by mouth 3 times a day. 60 Tab 2   • PRENATAL VIT-FE BISG-FA-DHA PO Take  by mouth.         Labs:   Recent Results (from the past 168 hour(s))   CBC WITH DIFFERENTIAL    Collection Time: 11/19/18  2:20 PM   Result Value Ref Range    WBC 10.3 4.8 - 10.8 K/uL    RBC 4.23 4.20 - 5.40 M/uL    Hemoglobin 12.6 12.0 - 16.0 g/dL    Hematocrit 38.3 37.0 - 47.0 %    MCV 90.5 81.4 - 97.8 fL    MCH 29.8 27.0 - 33.0 pg    MCHC 32.9 (L) 33.6 - 35.0 g/dL    RDW 48.6 35.9 - 50.0 fL    Platelet Count 227 164 - 446 K/uL    MPV 11.5 9.0 - 12.9 fL    Neutrophils-Polys 71.30 44.00 - 72.00 %    Lymphocytes 19.80 (L) 22.00 - 41.00 %    Monocytes 6.40 0.00 - 13.40 %    Eosinophils 1.00 0.00 - 6.90 %    Basophils 0.30 0.00 - 1.80 %    Immature Granulocytes 1.20 (H) 0.00 - 0.90 %    Nucleated RBC 0.00 /100 WBC    Neutrophils (Absolute) 7.33 (H) 2.00 - 7.15 K/uL    Lymphs (Absolute) 2.03 1.00 - 4.80 K/uL    Monos (Absolute) 0.66  0.00 - 0.85 K/uL    Eos (Absolute) 0.10 0.00 - 0.51 K/uL    Baso (Absolute) 0.03 0.00 - 0.12 K/uL    Immature Granulocytes (abs) 0.12 (H) 0.00 - 0.11 K/uL    NRBC (Absolute) 0.00 K/uL   COD - Adult (Type and Screen)    Collection Time: 11/19/18  2:25 PM   Result Value Ref Range    ABO Grouping Only O     Rh Grouping Only POS     Antibody Screen-Cod NEG    Hold Blood Bank Specimen (Not Tested)    Collection Time: 11/19/18  2:28 PM   Result Value Ref Range    Holding Tube - Bb DONE    CBC WITHOUT DIFFERENTIAL    Collection Time: 11/19/18 11:30 PM   Result Value Ref Range    WBC 13.6 (H) 4.8 - 10.8 K/uL    RBC 4.10 (L) 4.20 - 5.40 M/uL    Hemoglobin 12.3 12.0 - 16.0 g/dL    Hematocrit 36.7 (L) 37.0 - 47.0 %    MCV 89.5 81.4 - 97.8 fL    MCH 30.0 27.0 - 33.0 pg    MCHC 33.5 (L) 33.6 - 35.0 g/dL    RDW 48.2 35.9 - 50.0 fL    Platelet Count 182 164 - 446 K/uL    MPV 11.1 9.0 - 12.9 fL   PROTHROMBIN TIME    Collection Time: 11/20/18 12:20 AM   Result Value Ref Range    PT 14.1 12.0 - 14.6 sec    INR 1.08 0.87 - 1.13   FIBRINOGEN    Collection Time: 11/20/18 12:20 AM   Result Value Ref Range    Fibrinogen 365 215 - 460 mg/dL   CBC WITH DIFFERENTIAL    Collection Time: 11/20/18 12:20 AM   Result Value Ref Range    WBC 9.5 4.8 - 10.8 K/uL    RBC 3.54 (L) 4.20 - 5.40 M/uL    Hemoglobin 10.5 (L) 12.0 - 16.0 g/dL    Hematocrit 32.4 (L) 37.0 - 47.0 %    MCV 91.5 81.4 - 97.8 fL    MCH 29.7 27.0 - 33.0 pg    MCHC 32.4 (L) 33.6 - 35.0 g/dL    RDW 49.0 35.9 - 50.0 fL    Platelet Count 158 (L) 164 - 446 K/uL    MPV 11.4 9.0 - 12.9 fL    Neutrophils-Polys 73.60 (H) 44.00 - 72.00 %    Lymphocytes 19.80 (L) 22.00 - 41.00 %    Monocytes 4.90 0.00 - 13.40 %    Eosinophils 0.60 0.00 - 6.90 %    Basophils 0.10 0.00 - 1.80 %    Immature Granulocytes 1.00 (H) 0.00 - 0.90 %    Nucleated RBC 0.00 /100 WBC    Neutrophils (Absolute) 6.97 2.00 - 7.15 K/uL    Lymphs (Absolute) 1.87 1.00 - 4.80 K/uL    Monos (Absolute) 0.46 0.00 - 0.85 K/uL    Eos  (Absolute) 0.06 0.00 - 0.51 K/uL    Baso (Absolute) 0.01 0.00 - 0.12 K/uL    Immature Granulocytes (abs) 0.09 0.00 - 0.11 K/uL    NRBC (Absolute) 0.00 K/uL   ISTAT ARTERIAL BLOOD GAS    Collection Time: 11/20/18 12:23 AM   Result Value Ref Range    Ph 7.374 (L) 7.400 - 7.500    Pco2 39.2 (H) 26.0 - 37.0 mmHg    Po2 230 (H) 64 - 87 mmHg    Tco2 24 20 - 33 mmol/L    S02 100 (H) 93 - 99 %    Hco3 22.9 17.0 - 25.0 mmol/L    BE -2 -4 - 3 mmol/L    Body Temp 37.0 C degrees    O2 Therapy 100 %    iPF Ratio 230     Ph Temp Iban 7.374 (L) 7.400 - 7.500    Pco2 Temp Co 39.2 (H) 26.0 - 37.0 mmHg    Po2 Temp Cor 230 (H) 64 - 87 mmHg    Specimen Arterial     Action Range Triggered YES     Inst. Qualified Patient YES    ISTAT SODIUM    Collection Time: 11/20/18 12:23 AM   Result Value Ref Range    Istat Sodium 139 135 - 145 mmol/L   ISTAT POTASSIUM    Collection Time: 11/20/18 12:23 AM   Result Value Ref Range    Istat Potassium 3.7 3.6 - 5.5 mmol/L   ISTAT IONIZED CA    Collection Time: 11/20/18 12:23 AM   Result Value Ref Range    Istat Ionized Calcium 1.15 1.10 - 1.30 mmol/L   ISTAT HEMATOCRIT AND HEMOGLOBIN    Collection Time: 11/20/18 12:23 AM   Result Value Ref Range    Istat Hematocrit 29 (L) 37 - 47 %    Istat Hemoglobin 9.9 (L) 12.0 - 16.0 g/dL   MASSIVE TRANSFUSION    Collection Time: 11/20/18 12:29 AM   Result Value Ref Range    Component R       R3                  Red Blood Cells3    [id number]   released     11/20/18   00:39      Product Type Red Blood Cells LR Pheresis     Dispense Status Issued     Unit Number (Barcoded) [id number]     Product Code (Barcoded) I2143R80     Blood Type (Barcoded) 5100     Component R       R3                  Red Blood Cells3    [id number]   issued       11/20/18   00:09      Product Type Red Blood Cells LR Pheresis     Dispense Status Issued     Unit Number (Barcoded) [id number]     Product Code (Barcoded) M8432M06     Blood Type (Barcoded) 5100     Component R       R3                   Red Blood Cells3    [id number]   issued       11/20/18   00:09      Product Type Red Blood Cells LR Pheresis     Dispense Status Issued     Unit Number (Barcoded) [id number]     Product Code (Barcoded) E6131Y22     Blood Type (Barcoded) 5100     Component R       R3                  Red Blood Cells3    [id number]   issued       11/20/18   00:09      Product Type Red Blood Cells LR Pheresis     Dispense Status Issued     Unit Number (Barcoded) [id number]     Product Code (Barcoded) W7877U29     Blood Type (Barcoded) 5100     Component Ft       FPT                 Plasma, Thawed      [id number]   issued       11/20/18   00:09      Product Type Plasma  Thawed     Dispense Status Issued     Unit Number (Barcoded) [id number]     Product Code (Barcoded) S5726G05     Blood Type (Barcoded) 6200     Component Ft       FPT                 Plasma, Thawed      [id number]   issued       11/20/18   00:09      Product Type Plasma  Thawed     Dispense Status Issued     Unit Number (Barcoded) [id number]     Product Code (Barcoded) O5325L43     Blood Type (Barcoded) 6200     Component Ft       FPT                 Plasma, Thawed      [id number]   issued       11/20/18   00:09      Product Type Plasma  Thawed     Dispense Status Issued     Unit Number (Barcoded) [id number]     Product Code (Barcoded) C9351E02     Blood Type (Barcoded) 6200     Component Ft       FPT                 Plasma, Thawed      [id number]   issued       11/20/18   00:09      Product Type Plasma  Thawed     Dispense Status Issued     Unit Number (Barcoded) [id number]     Product Code (Barcoded) Q9187A46     Blood Type (Barcoded) 6200     Component P       PTP                 Plts,Pheresis       [id number]   released     11/20/18   00:36      Product Type Platelets  Pheresis LR     Dispense Status Issued     Unit Number (Barcoded) [id number]     Product Code (Barcoded) X1475H18     Blood Type (Barcoded)  5100     Component R       R3                  Red Blood Cells3    [id number]   released     11/20/18   00:39      Component R       R3                  Red Blood Cells3    [id number]   released     11/20/18   00:39      Component R       R3                  Red Blood Cells3    [id number]   released     11/20/18   00:39      Component Ft       FPT                 Plasma, Thawed      [id number]   released     11/20/18   00:39      Component Ft       FPT                 Plasma, Thawed      [id number]   released     11/20/18   00:39      Component Ft       FPT                 Plasma, Thawed      [id number]   released     11/20/18   00:39      Component Ft       FPT                 Plasma, Thawed      [id number]   released     11/20/18   00:39      Component P       PTP                 Plts,Pheresis       [id number]   released     11/20/18   00:36     CBC WITH DIFFERENTIAL    Collection Time: 11/20/18  6:13 AM   Result Value Ref Range    WBC 18.0 (H) 4.8 - 10.8 K/uL    RBC 3.25 (L) 4.20 - 5.40 M/uL    Hemoglobin 9.7 (L) 12.0 - 16.0 g/dL    Hematocrit 29.3 (L) 37.0 - 47.0 %    MCV 90.2 81.4 - 97.8 fL    MCH 29.8 27.0 - 33.0 pg    MCHC 33.1 (L) 33.6 - 35.0 g/dL    RDW 48.1 35.9 - 50.0 fL    Platelet Count 159 (L) 164 - 446 K/uL    MPV 11.4 9.0 - 12.9 fL    Neutrophils-Polys 88.80 (H) 44.00 - 72.00 %    Lymphocytes 8.00 (L) 22.00 - 41.00 %    Monocytes 2.10 0.00 - 13.40 %    Eosinophils 0.00 0.00 - 6.90 %    Basophils 0.20 0.00 - 1.80 %    Immature Granulocytes 0.90 0.00 - 0.90 %    Nucleated RBC 0.00 /100 WBC    Neutrophils (Absolute) 16.01 (H) 2.00 - 7.15 K/uL    Lymphs (Absolute) 1.44 1.00 - 4.80 K/uL    Monos (Absolute) 0.38 0.00 - 0.85 K/uL    Eos (Absolute) 0.00 0.00 - 0.51 K/uL    Baso (Absolute) 0.03 0.00 - 0.12 K/uL    Immature Granulocytes (abs) 0.16 (H) 0.00 - 0.11 K/uL    NRBC (Absolute) 0.00 K/uL   HISTOLOGY REQUEST    Collection Time: 11/20/18  7:34 AM   Result Value Ref Range     Pathology Request Sent to [facility]          normal postpartum course  No heavy bleeding or foul vaginal discharge       Discharge Instructions:  Discharge to home  Pelvic Rest x 6 weeks  Follow up: .TPC 1 week for endometritis check.   Waiting CBC    Discharge Meds:     Motrin 800 mg , as directed  Colace 100 mg BID  FeS04 325 mg as directed

## 2018-11-21 NOTE — PROGRESS NOTES
1900: Report received from Nichelle JONES. Patient stable.    2000:Assessment complete. All questions and concerns regarding POC are addressed. Call light within reach and patient encouraged to call for additional needs.    2130: Given DBM for supplement feeding and medicated for pain. Also given MMR vaccine.    0000:  Patient breastfeeding with effective latch. Patient expressed concern about only getting a couple drops when pumping. Educated on continuing to latch infant and pump every 3 hours to stimulate milk production, as well as holding infant skin to skin.    0200: Patient asleep    0330: Patient pumping. FOB given DBM for infant.    0630: Medicated for pain and given DBM for supplemental feeding.

## 2018-11-21 NOTE — OP REPORT
DATE OF SERVICE:  11/20/18     PREOPERATIVE DIAGNOSES:  1.  Intrauterine pregnancy at 40w5d  2.  Postpartum hemorrhage     POSTOPERATIVE DIAGNOSES:  1.  Intrauterine pregnancy at 40w5d  2.  same    PROCEDURE PERFORMED:  Postpartum D and C, placement of intrauterine balloon.     SURGEON:  Jatin Stapleton MD     ASSISTANT: Dr. Yates     ANESTHESIA:  General.     ANESTHESIOLOGIST:  MD Teo     SPECIMEN:  Curettings     ESTIMATED BLOOD LOSS:  1000 mL in PP room, another 1000 mL in OR     FINDINGS:  Severely atonic uterus, especially in lower segment. No cervical lacerations     COMPLICATIONS:  None.     PROCEDURE: Pt experienced approximately 1000cc blood loss following an uncomplicated vaginal delivery.  Unresponsive to pitocin, methergine, hemabate and cytotec.  Bedside uterine evacuation yielded some clots, no placenta pieces.    Pt continued to bleed in PP room, decisions to move to OR made    In OR, pt underwent curettage (sharp and suction). Small piece of membrane noted.  Significant uterine atony noted, especially in lower segment    Tranexamic acid 1000mg given IV,    Atony continued, intrauterine balloon placed under US guidance.  Bleeding stopped at that time    Continued to monitor pt, no further bleeding noted.  Procedure ended at this time.  Pt moved to PACU in stable condition    IVF 5000cc      ____________________________________  Jatin Stapleton MD

## 2018-11-21 NOTE — DISCHARGE INSTRUCTIONS
POSTPARTUM DISCHARGE INSTRUCTIONS FOR MOM    YOB: 1980   Age: 38 y.o.               Admit Date: 11/19/2018     Discharge Date: 11/21/2018  Attending Doctor:  Frank Finch M.D.                  Allergies:  Patient has no known allergies.    Discharged to home by car. Discharged via wheelchair, hospital escort: Yes.  Special equipment needed: Not Applicable  Belongings with: Personal  Be sure to schedule a follow-up appointment with your primary care doctor or any specialists as instructed.     Discharge Plan:   Diet Plan: Discussed  Activity Level: Discussed  Confirmed Follow up Appointment: Appointment Scheduled  Confirmed Symptoms Management: Discussed  Medication Reconciliation Updated: Yes  Influenza Vaccine Indication: Not indicated: Previously immunized this influenza season and > 8 years of age    REASONS TO CALL YOUR OBSTETRICIAN:  1.   Persistent fever or shaking chills (Temperature higher than 100.4)  2.   Heavy bleeding (soaking more than 1 pad per hour); Passing clots  3.   Foul odor from vagina  4.   Mastitis (Breast infection; breast pain, chills, fever, redness)  5.   Urinary pain, burning or frequency  6.   Episiotomy infection  7.   Severe depression longer than 24 hours    HAND WASHING  · Prior to handling the baby.  · Before breastfeeding or bottle feeding baby.  · After using the bathroom or changing the baby's diaper.    VAGINAL CARE  · Nothing inside vagina for 6 weeks: no sexual intercourse, tampons or douching.  · Bleeding may continue for 2-4 weeks.  Amount may vary.    · Call your physician for heavy bleeding which means soaking more than 1 pad per hour    BIRTH CONTROL  · It is possible to become pregnant at any time after delivery and while breastfeeding.  · Plan to discuss a method of birth control with your physician at your follow up visit. visit.    DIET AND ELIMINATION  · Eating more fiber (bran cereal, fruits, and vegetables) and drinking plenty of fluids will help to  "avoid constipation.  · Urinary frequency after childbirth is normal.    POSTPARTUM BLUES  During the first few days after birth, you may experience a sense of the \"blues\" which may include impatience, irritability or even crying.  These feeling come and go quickly.  However, as many as 1 in 10 women experience emotional symptoms known as postpartum depression.    Postpartum depression:  May start as early as the second or third day after delivery or take several weeks or months to develop.  Symptoms of \"blues\" are present, but are more intense:  Crying spells; loss of appetite; feelings of hopelessness or loss of control; fear of touching the baby; over concern or no concern at all about the baby; little or no concern about your own appearance/caring for yourself; and/or inability to sleep or excessive sleeping.  Contact your physician if you are experiencing any of these symptoms.    Crisis Hotline:  · [place] Crisis Hotline:  0-757-ACEAZHT  Or 1-687.993.4289  · [place] Crisis Hotline:  1-659.221.1278  Or 724-404-9986    PREVENTING SHAKEN BABY:  If you are angry or stressed, PUT THE BABY IN THE CRIB, step away, take some deep breaths, and wait until you are calm to care for the baby.  DO NOT SHAKE THE BABY.  You are not alone, call a supporter for help.    · Crisis Call Center 24/7 crisis line 037-454-0267 or 1-756.743.4652  · You can also text them, text \"ANSWER\" to 127091    QUIT SMOKING/TOBACCO USE:  I understand the use of any tobacco products increases my chance of suffering from future heart disease and could cause other illnesses which may shorten my life. Quitting the use of tobacco products is the single most important thing I can do to improve my health. For further information on smoking / tobacco cessation call a Toll Free Quit Line at 1-907.460.9027 (*[employer]) or 1-300.481.4016 (American Lung Association) or you can access the web based program at www.lungusa.org.    · [place] Tobacco " Users Help Line:  (920) 731-8470       Toll Free: 1-799.129.8988  · Quit Tobacco Program [facility] (619)420-1897    DEPRESSION / SUICIDE RISK:  As you are discharged from this [facility], it is important to learn how to keep safe from harming yourself.    Recognize the warning signs:  · Abrupt changes in personality, positive or negative- including increase in energy   · Giving away possessions  · Change in eating patterns- significant weight changes-  positive or negative  · Change in sleeping patterns- unable to sleep or sleeping all the time   · Unwillingness or inability to communicate  · Depression  · Unusual sadness, discouragement and loneliness  · Talk of wanting to die  · Neglect of personal appearance   · Rebelliousness- reckless behavior  · Withdrawal from people/activities they love  · Confusion- inability to concentrate     If you or a loved one observes any of these behaviors or has concerns about self-harm, here's what you can do:  · Talk about it- your feelings and reasons for harming yourself  · Remove any means that you might use to hurt yourself (examples: pills, rope, extension cords, firearm)  · Get professional help from the community (Mental Health, Substance Abuse, psychological counseling)  · Do not be alone:Call your Safe Contact- someone whom you trust who will be there for you.  · Call your local CRISIS HOTLINE 170-9224 or 548-418-3986  · Call your local Children's Mobile Crisis Response Team [place] (613) 392-3102 or www.[employer]  · Call the toll free National Suicide Prevention Hotlines   · National Suicide Prevention Lifeline 987-345-ANMV (2728)  · National Hope Line Network 800-SUICIDE (784-2429)    DISCHARGE SURVEY:  Thank you for choosing [facility].  We hope we provided you with very good care.  You may be receiving a survey in the mail.  Please fill it out.  Your opinion is valuable to us.    ADDITIONAL EDUCATIONAL MATERIALS  GIVEN TO PATIENT:        My signature on this form indicates that:  1.  I have reviewed and understand the above information  2.  My questions regarding this information have been answered to my satisfaction.  3.  I have formulated a plan with my discharge nurse to obtain my prescribed medication for home.

## 2018-11-21 NOTE — PROGRESS NOTES
Discharged home. Escorted by volunteer. Baby is in the car seat. Both mom and baby are doing well.

## 2018-11-21 NOTE — LACTATION NOTE
This note was copied from a baby's chart.  Plan: Spend lots of time with baby on mothers chest-skin to skin. Pump with hospital grade electric breast pump every 2-3 hours/8-10 times per 24 hour period. Practice hand expression. Review [place] breastfeeding web page for video clips on latching, hand expressing breasts, and milk supply. Attempt to latch baby whenever baby is hungry, shows feeding cues. If mother or baby too frustrated or tired to attempt latch, skip that but still pump, hand express and spend time skin to skin.Feed baby appropriate amounts of expressed colostrum/milk. Supplement as needed with donor milk , may use to help baby latch with prior to feeding bottle. Educated mother regarding slow paced feeding method and appropriate volumes.

## 2018-11-21 NOTE — CARE PLAN
Problem: Altered physiologic condition related to immediate post-delivery state and potential for bleeding/hemorrhage  Goal: Patient physiologically stable as evidenced by normal lochia, palpable uterine involution and vital signs within normal limits  Outcome: PROGRESSING AS EXPECTED  Patient stable and resting comfortably. Physical assessment WDL. (See flowsheet). Lab values, and vital signs WDL for postpartum state.    Problem: Potential for postpartum infection related to presence of episiotomy/vaginal tear and/or uterine contamination  Goal: Patient will be absent from signs and symptoms of infection  Outcome: PROGRESSING AS EXPECTED  No signs or symptoms of infection.

## 2018-11-21 NOTE — DISCHARGE PLANNING
Discharge Planning Assessment Post Partum    Reason for Referral: History of suicide attempt and depression.  Address: [address] [place] [id number]  Phone: [phone]  Type of Living Situation: living with FOB and children  Mom Diagnosis: Pregnancy  Baby Diagnosis: [place]  Primary Language: English    Name of Baby: Amena Gloria (: 18)  Father of the Baby: Edward Gloria  Involved in baby’s care? Yes    Prenatal Care: Yes  Mom's PCP: [facility]  PCP for new baby:[facility]    Support System: FO  Coping/Bonding between mother & baby: Yes  Source of Feeding: pumping  Supplies for Infant: prepared, parents deny any needs    Mom's Insurance: Medicaid  Baby Covered on Insurance:Yes  Mother Employed/School: No  Other children in the home/names & ages: 5 year old-Emiliana Gloria (13) and 2 year old-Alonzo Gloria (4/10/16)    Financial Hardship/Income: denies   Mom's Mental status: alert and oriented  Services used prior to admit: Medicaid    CPS History: denies  Psychiatric History: past history of depression and suicide attempt from 2007.  Denies any symptoms.  Provided counseling resource for post partum depression.  Domestic Violence History: denies  Drug/ETOH History: denies    Resources Provided: pediatrician list, children and family resource list, counseling resource list  Referrals Made: none     Clearance for Discharge:  Infant is cleared to discharge home with parents.

## 2018-11-21 NOTE — LACTATION NOTE
This note was copied from a baby's chart.  Mother's plan now is to pump and bottle feed most of the time as baby is very fussy at the breast. Encouraged to continue latch practice when milk volume increases and return for weight checks and consultation as available through the IMB study. She is renting a Hg breast pump today and scheduling her follow up appointments around weight checks for this and next week. Slow paced bottle feeding and [place] Max. Milk videos recommended.

## 2018-11-26 ENCOUNTER — TELEPHONE (OUTPATIENT)
Dept: OBGYN | Facility: CLINIC | Age: 38
End: 2018-11-26

## 2018-11-26 NOTE — TELEPHONE ENCOUNTER
"Pt called stating she delivered her baby last week and was told if she is having any stomach pain to call and make an appointment. She states she had \"some scraping done because of hemorrhaging.\" Pt was transferred to [facility] to schedule appointment.   "

## 2018-11-27 ENCOUNTER — POST PARTUM (OUTPATIENT)
Dept: OBGYN | Facility: CLINIC | Age: 38
End: 2018-11-27
Payer: MEDICAID

## 2018-11-27 VITALS — BODY MASS INDEX: 37.08 KG/M2 | SYSTOLIC BLOOD PRESSURE: 120 MMHG | WEIGHT: 216 LBS | DIASTOLIC BLOOD PRESSURE: 78 MMHG

## 2018-11-27 PROCEDURE — 90050 PR POSTPARTUM VISIT: CPT | Performed by: NURSE PRACTITIONER

## 2018-11-27 RX ORDER — DOCUSATE SODIUM 100 MG/1
100 CAPSULE, LIQUID FILLED ORAL 2 TIMES DAILY
Qty: 60 CAP | Refills: 1 | Status: SHIPPED | OUTPATIENT
Start: 2018-11-27 | End: 2019-02-08

## 2018-11-27 RX ORDER — FERROUS SULFATE 325(65) MG
325 TABLET ORAL 2 TIMES DAILY
Qty: 60 TAB | Refills: 1 | Status: SHIPPED | OUTPATIENT
Start: 2018-11-27 | End: 2019-01-23 | Stop reason: SDUPTHER

## 2018-11-27 NOTE — PROGRESS NOTES
Fit in Postpartum s/p pp hemorrhage with D&C.    S) Patient is a 38 yoa G 4 P 2013 delivered  with postpartum hemorrhage with D&C likely due to uterine atony. PPD8. States no dysuria, has no vaginal odor. States normal lochia. States feels abdominal cramping only when standing up from a sitting position. Is both breast and bottle feeding  O) t 96.3, /78, Weight 216.   H&H 7.0/22.1  No additional PE done  P) sent iron and colace to pharmacy. S/s infection reviewed. If no continuing issues patient will return for pp exam. Desires IUD.

## 2018-11-27 NOTE — PROGRESS NOTES
Pt here today for postpartum fit in.  Delivery Date 11/19/18   Currently: breast milk  BCM: pt woulD like to discuss IUD, [facility].   Good ph:[phone]  Pt states having stomach pain  Chaperone offered and not indicated

## 2018-12-28 ENCOUNTER — POST PARTUM (OUTPATIENT)
Dept: OBGYN | Facility: CLINIC | Age: 38
End: 2018-12-28
Payer: MEDICAID

## 2018-12-28 VITALS — BODY MASS INDEX: 36.73 KG/M2 | DIASTOLIC BLOOD PRESSURE: 72 MMHG | WEIGHT: 214 LBS | SYSTOLIC BLOOD PRESSURE: 114 MMHG

## 2018-12-28 DIAGNOSIS — N61.0 MASTITIS: ICD-10-CM

## 2018-12-28 PROBLEM — R77.2 ABNORMAL AFP3 TEST: Status: RESOLVED | Noted: 2018-06-17 | Resolved: 2018-12-28

## 2018-12-28 PROBLEM — Z28.39 RUBELLA NON-IMMUNE STATUS, ANTEPARTUM: Status: RESOLVED | Noted: 2018-06-14 | Resolved: 2018-12-28

## 2018-12-28 PROBLEM — O23.43 UTI IN PREGNANCY, ANTEPARTUM, THIRD TRIMESTER: Status: RESOLVED | Noted: 2018-09-05 | Resolved: 2018-12-28

## 2018-12-28 PROBLEM — O09.899 RUBELLA NON-IMMUNE STATUS, ANTEPARTUM: Status: RESOLVED | Noted: 2018-06-14 | Resolved: 2018-12-28

## 2018-12-28 PROCEDURE — 90050 PR POSTPARTUM VISIT: CPT | Performed by: NURSE PRACTITIONER

## 2018-12-28 ASSESSMENT — ENCOUNTER SYMPTOMS
EYES NEGATIVE: 1
CARDIOVASCULAR NEGATIVE: 1
NEUROLOGICAL NEGATIVE: 1
CONSTITUTIONAL NEGATIVE: 1
GASTROINTESTINAL NEGATIVE: 1
MUSCULOSKELETAL NEGATIVE: 1
PSYCHIATRIC NEGATIVE: 1
RESPIRATORY NEGATIVE: 1

## 2018-12-28 NOTE — PROGRESS NOTES
Pt here today for postpartum exam.  Delivery Date 11/19/18  Pt states sometimes get overwhelmed  Currently: pumping and bottle feeding  BCM: Pt desires bc nexplanon, [facility].   Good ph:[phone]  Pap 6/2018-WNL

## 2018-12-28 NOTE — PROGRESS NOTES
Subjective:      Terri Alvares is a 38 y.o.  female who presents for her postpartum exam. She had a  with PPH. Her prenatal course was complicated by HSV infection and AMA.. She denies dysuria, vaginal bleeding, odor, or itching. She reports painful nipples and hard lump in her breast rith breast. She is breast pumping and bottle feeding. She desires a Nexplanon for her birth control method. Reports no sex prior to this appointment.  Denies any S/S of PP depression.    Assessment   Assessment:    1. PP care of lactating women   2. Exam WNL   3. Pap WNL on 2018  4. Desires contraception     Patient Active Problem List    Diagnosis Date Noted   • Encounter for postpartum care after hospital delivery 2018   • HSV infection 2018   • Advanced maternal age in multigravida 2013   • History of laparoscopic adjustable gastric banding 2013   • History of depression 2013   • History of suicide attempt 2013       Plan   Plan:    1. Breastfeeding support   2. Continue PNV   3. Contraceptive counseling - follow up w [facility] for Nexplanon placement  4. Encouraged condom use   5. Discussed diet, exercise and resumption of sexual activity   6. Gave copy of pap   7.  F/u c PCP or [facility] as needed for primary care needs.           HPI    Review of Systems   Constitutional: Negative.    HENT: Negative.    Eyes: Negative.    Respiratory: Negative.    Cardiovascular: Negative.    Gastrointestinal: Negative.    Genitourinary: Negative.    Musculoskeletal: Negative.    Skin: Negative.    Neurological: Negative.    Endo/Heme/Allergies: Negative.    Psychiatric/Behavioral: Negative.           Objective:     /72   Wt 97.1 kg (214 lb)   BMI 36.73 kg/m²    Temp: 99.6F    Physical Exam   Constitutional: She is oriented to person, place, and time. She appears well-developed and well-nourished.   Pulmonary/Chest: Effort normal.   Abdominal: Soft.   Genitourinary: Vagina normal and  uterus normal. Rectal exam shows no tenderness. Pelvic exam was performed with patient supine. No labial fusion. There is no rash, tenderness, lesion or injury on the right labia. There is no rash, tenderness, lesion or injury on the left labia. No erythema, tenderness or bleeding in the vagina. No signs of injury around the vagina. No vaginal discharge found.   Neurological: She is alert and oriented to person, place, and time.   Skin: Skin is warm and dry.   Psychiatric: She has a normal mood and affect. Her behavior is normal. Judgment and thought content normal.   Nipples: dry and cracked  Breasts: Rt breast frim, tender to touch, red lines radiating from nipple            Assessment/Plan:     1. Encounter for postpartum care of lactating mother  -dicloxacillin 500 mg QID x 10 days  - REFERRAL TO GYNECOLOGY

## 2019-01-03 NOTE — PROGRESS NOTES
I saw and evaluated the patient. Discussed with CNM and agree with findings and plan as documented in the note.

## 2019-01-10 ENCOUNTER — TELEPHONE (OUTPATIENT)
Dept: OBGYN | Facility: CLINIC | Age: 39
End: 2019-01-10

## 2019-01-10 ENCOUNTER — GYNECOLOGY VISIT (OUTPATIENT)
Dept: OBGYN | Facility: CLINIC | Age: 39
End: 2019-01-10
Payer: MEDICAID

## 2019-01-10 VITALS
TEMPERATURE: 98.4 F | DIASTOLIC BLOOD PRESSURE: 70 MMHG | SYSTOLIC BLOOD PRESSURE: 108 MMHG | BODY MASS INDEX: 36.73 KG/M2 | WEIGHT: 214 LBS

## 2019-01-10 DIAGNOSIS — O92.29: ICD-10-CM

## 2019-01-10 PROCEDURE — 99213 OFFICE O/P EST LOW 20 MIN: CPT | Performed by: ADVANCED PRACTICE MIDWIFE

## 2019-01-10 NOTE — NON-PROVIDER
Pt here for c/o breast pain  Pt states when her breasts are full, burning sensation, breast do not let down milk, itchy breasts  Good# [phone]  Pharmacy verified

## 2019-01-10 NOTE — TELEPHONE ENCOUNTER
Pt called c/o bilateral breast pain, was on Abx for Mastitis and still having pain. Pt requesting to be seen if possible. Offered pt appt for this afternoon at 1pm, pt agrees to plan and will come in. Pt had no other questions or concerns

## 2019-01-11 NOTE — PROGRESS NOTES
"Subjective   Subjective:    Terri Alvares is a 38 y.o. female who presents for concern of continued breast pain. She was previously diagnosed with mastitis and is finishing antibiotics with one day remaining. She is 8 weeks postpartum and currently attempting to wean infant by breastpumping. She is binding her breasts and using guaze on the nipples for comfort.  She denies any redness or itching on breasts or nipples. She denies headaches, fever, or chills. She is sleeping better but the pain of the breasts are causing her to wake at night. She has not tried anything for the breast pain outside of massaging any areas with \"knots:. She rates the pain 6/10 and burning if she feels that her breasts are full.     All other systems are reviewed that are pertinent and negative for significant findings outside of HPI.   Problem List     Patient Active Problem List    Diagnosis Date Noted   • Encounter for postpartum care after hospital delivery 12/28/2018   • Mastitis 12/28/2018   • HSV infection 06/13/2018   • Advanced maternal age in multigravida 05/07/2013   • History of laparoscopic adjustable gastric banding 05/07/2013   • History of depression 05/07/2013   • History of suicide attempt 05/07/2013      Objective    Well nourished female in no acute distress  A& O x 3  Heart RRR  BS x 4; no guarding or tenderness  Breasts: bilateral excoriation on either side of areola. Pain to deep palpation on bilateral breast that is diffuse. No evident streaking or discharge. Breast milk easily expressed with manipulation.  No masses noted.       Assessment   Assessment:    1. PP care of lactating women   2. Mastodynia  Plan   Plan:    1. Breastfeeding support   2. Discussed in detail with patient that her breast pain seems to be more nerve related and due to her on and off pumping and cessation of nursing. She strongly desires to continue pumping her breasts. Discussed comfort measures including PRN ibuprofen, cold cabbage " leaves, showering away from shower head, minimal nipple stimulation, and stopping placing gauze on the nipples. She verbalizes understanding of this teaching. She does desire to breast pump one additional month.   3. Patient did inquire about pelvic floor exercises. Briefly discussed kegel and other pelvic floor exercises to help with perceived weakness.

## 2019-01-15 ENCOUNTER — GYNECOLOGY VISIT (OUTPATIENT)
Dept: OBGYN | Facility: CLINIC | Age: 39
End: 2019-01-15
Payer: MEDICAID

## 2019-01-15 VITALS — WEIGHT: 214 LBS | BODY MASS INDEX: 36.73 KG/M2 | DIASTOLIC BLOOD PRESSURE: 80 MMHG | SYSTOLIC BLOOD PRESSURE: 124 MMHG

## 2019-01-15 DIAGNOSIS — Z30.8 ENCOUNTER FOR OTHER CONTRACEPTIVE MANAGEMENT: ICD-10-CM

## 2019-01-15 LAB
INT CON NEG: NEGATIVE
INT CON POS: POSITIVE
POC URINE PREGNANCY TEST: NEGATIVE

## 2019-01-15 PROCEDURE — 81025 URINE PREGNANCY TEST: CPT | Performed by: OBSTETRICS & GYNECOLOGY

## 2019-01-15 PROCEDURE — 11981 INSERTION DRUG DLVR IMPLANT: CPT | Performed by: OBSTETRICS & GYNECOLOGY

## 2019-01-15 NOTE — PROCEDURES
Procedures                                 Procedure Note : Nexplanon Insertion :   After informed consent and discussion of risks and benefits , patient who describes her dominant hand as right, was prepped and draped to expose the inner medial surface of the upper left  ID .Jamar at 8 cm distal to the medial epicondyl  1% lidocaine placed sub-Q along course of implant . Area totally anesthestized.   Nexplanon applicator placed with bevel of needle down ad skin lifted and inserted to hub   Trigger fired and nexplanon released: Implant palpated by myself and patient in proper location   Small needle puncture hemostatic   Steri Strips placed   Arm wrapped with self sealing gauze   Patient given Postoperative Advice  P. NSAIDS and tylenol for pain  Remove gauze wrap after 24 hrs , or at anytime it becomes uncomfortable   Steri Strips to be removed at 3-4 days  RTC 1 week

## 2019-01-15 NOTE — PROGRESS NOTES
Patient here for Nexplanon insertion  LMP: unknown   UPT: Negative   PAP 06/13/2018 WNL on file   Pharmacy Confirmed  Phone Number: [phone]

## 2019-01-23 RX ORDER — FERROUS SULFATE 325(65) MG
TABLET ORAL
Qty: 60 TAB | Refills: 1 | Status: SHIPPED | OUTPATIENT
Start: 2019-01-23 | End: 2019-02-08

## 2019-01-24 NOTE — PROGRESS NOTES
, [facility] , [place] 39w2d. Pt presents to L&D with c/o LOF since approximately 0300. Pt states she has seasonal allergies and during coughing fits will leak fluid, but is ensure if it is urine or amniotic fluid. Pt denies VB, reports occasional UCs, and reports + Fetal movement. Pt escorted to triage room, oriented to room and unit, and external monitors applied. POC discussed with pt and s/o and encouraged to state needs or questions at any time.    1950 Sterile speculum exam by HU Daley RN, no pooling of fluid noted. Sample collected for fern ROM assay. SVE by RN 4/80/-3    2018 HU Montgomery CNM called and given report, provider reviewed tracing, D/C order received.     HU Montgomery CNM at bedside, POC discussed with pt and s/o.     General L&D D/C instructions reviewed with pt and s/o who state understanding. Pt d/c to self with s/o.   Please call patient and schedule for 60 minute stroke clinic follow up with Dr. Francine Lambert next available.   Also please transfer him to pre services to schedule MRI ordered by cranial team.

## 2019-02-08 ENCOUNTER — HOSPITAL ENCOUNTER (EMERGENCY)
Facility: MEDICAL CENTER | Age: 39
End: 2019-02-09
Attending: EMERGENCY MEDICINE
Payer: MEDICAID

## 2019-02-08 DIAGNOSIS — N61.0 MASTITIS: ICD-10-CM

## 2019-02-08 DIAGNOSIS — N64.4 BREAST PAIN: ICD-10-CM

## 2019-02-08 PROCEDURE — 99283 EMERGENCY DEPT VISIT LOW MDM: CPT

## 2019-02-09 VITALS
HEART RATE: 77 BPM | HEIGHT: 64 IN | DIASTOLIC BLOOD PRESSURE: 79 MMHG | OXYGEN SATURATION: 99 % | SYSTOLIC BLOOD PRESSURE: 128 MMHG | RESPIRATION RATE: 16 BRPM | WEIGHT: 216.93 LBS | TEMPERATURE: 98.2 F | BODY MASS INDEX: 37.04 KG/M2

## 2019-02-09 RX ORDER — IBUPROFEN 600 MG/1
600 TABLET ORAL EVERY 6 HOURS PRN
Qty: 30 TAB | Refills: 0 | Status: SHIPPED | OUTPATIENT
Start: 2019-02-09 | End: 2019-02-12

## 2019-02-09 RX ORDER — SULFAMETHOXAZOLE AND TRIMETHOPRIM 800; 160 MG/1; MG/1
1 TABLET ORAL 2 TIMES DAILY
Qty: 20 TAB | Refills: 0 | Status: SHIPPED | OUTPATIENT
Start: 2019-02-09 | End: 2019-02-19

## 2019-02-09 NOTE — ED TRIAGE NOTES
".  Chief Complaint   Patient presents with   • Breast Pain     Pt is 2 months postpartum and states she is having left sided breast pain. Has been on abx to treat mastitis but states that it goes away and then comes back     ./91   Pulse 85   Temp 36.5 °C (97.7 °F) (Temporal)   Resp 18   Ht 1.626 m (5' 4\")   Wt 98.4 kg (216 lb 14.9 oz)   LMP  (LMP Unknown)   SpO2 100%   BMI 37.24 kg/m²     "

## 2019-02-09 NOTE — ED PROVIDER NOTES
ED Provider Note    CHIEF COMPLAINT  Chief Complaint   Patient presents with   • Breast Pain     Pt is 2 months postpartum and states she is having left sided breast pain. Has been on abx to treat mastitis but states that it goes away and then comes back     HPI  Patient is otherwise healthy 38-year-old female presents emergency room for evaluation of bilateral breast pain.  The patient states that she has had issues with breast pain following the birth of her child, who is currently 2 months old, and she has had problems with mastitis and was recently placed on during the course of taking Keflex she has noticed small amounts of painful nodules in her breasts that are intermittently relieved with expression and she has had a couple areas of hair follicle infections on her scalp.  She denies any gross enlargement of the breasts and denies any fevers at this time though she has had some subjective fevers and chills over the last 24 hours.    REVIEW OF SYSTEMS  Constitutional: as above, no recent illness.  Skin: as above  Eyes: No change in vision, no discharge.  ENT: No hearing change. No rhinorrhea or nasal congestion, no ST or difficulty swallowing.  Respiratory: No SOB. No coughing or hemoptysis  Cardiac: No CP, palpitations  GI: No Abdominal Pain; N/V; diarrhea  : No dysuria. No D/C.  MSK: No pain in joints or muscles. No calf pain or swelling.    PAST MEDICAL HISTORY   has a past medical history of GDM (gestational diabetes mellitus) (7/3/2013) and Psychiatric problem.    SOCIAL HISTORY  Social History     Social History Main Topics   • Smoking status: Former Smoker     Types: Cigarettes     Quit date: 11/12/2013   • Smokeless tobacco: Never Used      Comment: last used 3/13 due to pregnancy   • Alcohol use No      Comment: occ    • Drug use: No   • Sexual activity: Yes     Partners: Male     Birth control/ protection: Surgical      Comment: Nexplanon        SURGICAL HISTORY   has a past surgical history that  "includes eye surgery (2011); gastric banding laparoscopic (12/12); and dilation and curettage (11/19/2018).    CURRENT MEDICATIONS  Home Medications     Reviewed by Alicia Melgar R.N. (Registered Nurse) on 02/08/19 at 2124  Med List Status: Partial   Medication Last Dose Status        Patient Kendall Taking any Medications                     ALLERGIES  No Known Allergies    PHYSICAL EXAM  VITAL SIGNS: /79   Pulse 77   Temp 36.8 °C (98.2 °F) (Temporal)   Resp 16   Ht 1.626 m (5' 4\")   Wt 98.4 kg (216 lb 14.9 oz)   LMP  (LMP Unknown)   SpO2 99%   BMI 37.24 kg/m²    Pulse ox interpretation: I interpret this pulse ox as normal.  Genl: F sitting in chair comfortably, speaking clearly, appears in no acute distress   Head: NC/AT   ENT: Mucous membranes moist, posterior pharynx clear, uvula midline, nares patent bilaterally   Pulmonary: Lungs are clear to auscultation bilaterally  Chest: Breasts are symmetric with normal lie, there is tenderness in the ortega-alveolar glandular tissues with some warmth without erythema appreciated.  There is no soft tissue breakdown or erythema at the nipple or surrounding tissues.  She has no lymphadenopathy.  CV:  RRR, no murmur appreciated, pulses 2+ in both upper and lower extremities  Abdomen: soft, NT/ND; no rebound/guarding, no masses palpated  Musculoskeletal: Pain free ROM of the neck. Moving upper and lower extremities and spontaneous in coordinated fashion  Skin: 2 isolated soft tissue follicular areas of erythema with underlying induration without denys fluctuance noted on the left temporal scalp..  No pallor or jaundice.  No cyanosis.  Warm and dry.     COURSE & MEDICAL DECISION MAKING  Pertinent Labs & Imaging studies reviewed. (See chart for details)    DDX: mastitis, folliculitis, glandula infection, engorgement    MDM    Initial evaluation at 1215:    Patient presents emergency room for worsening bilateral breast pain that on clinical exam is suggestive of " worsening mastitis.  She has inflammation and warmth in the glandular tissues without evidence of an abscess or deep tissue infection.  She has no overlying cellulitis and no evidence of secondary infection at the breast/alveolar margins.  She has no concerning adenopathy and there is some evidence of soft tissue cellulitis noted on the scalp.  She has been on Keflex for several days and has had minimal response.  Due to the recurrence and the findings on the scan of a follicular infection I will expand her coverage to include MRSA.  We had extensive discussion regarding the working diagnosis and the importance of adding Bactrim to her regimen.  Questions are addressed including breast-feeding safety and with the best available evidence Bactrim is an appropriate medication for this.  She understands that she will need repeat evaluation in 3-5 days to reassess her wounds and clinical improvement and may return the emergency department if she develops fevers chills or any breast asymmetry.  Questions are addressed and she is discharged home in stable condition.    FINAL IMPRESSION  Visit Diagnoses     ICD-10-CM   1. Mastitis N61.0   2. Breast pain N64.4     Electronically signed by: Seth Ramires, 2/8/2019 11:51 PM

## 2019-05-13 ENCOUNTER — APPOINTMENT (OUTPATIENT)
Dept: RADIOLOGY | Facility: IMAGING CENTER | Age: 39
End: 2019-05-13
Attending: PHYSICIAN ASSISTANT
Payer: MEDICAID

## 2019-05-13 ENCOUNTER — NON-PROVIDER VISIT (OUTPATIENT)
Dept: URGENT CARE | Facility: PHYSICIAN GROUP | Age: 39
End: 2019-05-13
Payer: MEDICAID

## 2019-05-13 DIAGNOSIS — R63.8 EFFECTS OF THIRST: ICD-10-CM

## 2019-05-13 DIAGNOSIS — K95.09 OTHER COMPLICATIONS OF GASTRIC BAND PROCEDURE: ICD-10-CM

## 2019-05-13 DIAGNOSIS — K59.09 OTHER CONSTIPATION: ICD-10-CM

## 2019-05-13 PROCEDURE — 74019 RADEX ABDOMEN 2 VIEWS: CPT | Performed by: FAMILY MEDICINE

## 2019-08-27 ENCOUNTER — APPOINTMENT (OUTPATIENT)
Dept: ADMISSIONS | Facility: MEDICAL CENTER | Age: 39
End: 2019-08-27
Payer: MEDICAID

## 2019-08-27 ENCOUNTER — ANESTHESIA EVENT (OUTPATIENT)
Dept: SURGERY | Facility: MEDICAL CENTER | Age: 39
End: 2019-08-27
Payer: MEDICAID

## 2019-08-27 ENCOUNTER — HOSPITAL ENCOUNTER (OUTPATIENT)
Dept: RADIOLOGY | Facility: MEDICAL CENTER | Age: 39
End: 2019-08-27
Attending: OBSTETRICS & GYNECOLOGY
Payer: MEDICAID

## 2019-08-27 DIAGNOSIS — N64.4 BREAST PAIN: ICD-10-CM

## 2019-08-27 DIAGNOSIS — Z01.812 PRE-OPERATIVE LABORATORY EXAMINATION: ICD-10-CM

## 2019-08-27 LAB — HCG SERPL QL: NEGATIVE

## 2019-08-27 PROCEDURE — 76642 ULTRASOUND BREAST LIMITED: CPT | Mod: LT

## 2019-08-27 PROCEDURE — G0279 TOMOSYNTHESIS, MAMMO: HCPCS

## 2019-08-27 PROCEDURE — 36415 COLL VENOUS BLD VENIPUNCTURE: CPT

## 2019-08-27 PROCEDURE — 84703 CHORIONIC GONADOTROPIN ASSAY: CPT

## 2019-08-27 SDOH — HEALTH STABILITY: MENTAL HEALTH: HOW OFTEN DO YOU HAVE 6 OR MORE DRINKS ON ONE OCCASION?: NEVER

## 2019-08-27 SDOH — HEALTH STABILITY: MENTAL HEALTH: HOW MANY STANDARD DRINKS CONTAINING ALCOHOL DO YOU HAVE ON A TYPICAL DAY?: 1 OR 2

## 2019-08-27 SDOH — HEALTH STABILITY: MENTAL HEALTH: HOW OFTEN DO YOU HAVE A DRINK CONTAINING ALCOHOL?: MONTHLY OR LESS

## 2019-08-28 ENCOUNTER — ANESTHESIA (OUTPATIENT)
Dept: SURGERY | Facility: MEDICAL CENTER | Age: 39
End: 2019-08-28
Payer: MEDICAID

## 2019-08-28 ENCOUNTER — HOSPITAL ENCOUNTER (OUTPATIENT)
Facility: MEDICAL CENTER | Age: 39
End: 2019-08-28
Attending: SURGERY | Admitting: SURGERY
Payer: MEDICAID

## 2019-08-28 VITALS
OXYGEN SATURATION: 98 % | BODY MASS INDEX: 37.86 KG/M2 | RESPIRATION RATE: 22 BRPM | HEIGHT: 64 IN | DIASTOLIC BLOOD PRESSURE: 76 MMHG | TEMPERATURE: 98.1 F | HEART RATE: 60 BPM | SYSTOLIC BLOOD PRESSURE: 141 MMHG | WEIGHT: 221.78 LBS

## 2019-08-28 DIAGNOSIS — G89.18 POSTOPERATIVE PAIN: ICD-10-CM

## 2019-08-28 PROCEDURE — 700111 HCHG RX REV CODE 636 W/ 250 OVERRIDE (IP): Performed by: ANESTHESIOLOGY

## 2019-08-28 PROCEDURE — 700101 HCHG RX REV CODE 250: Performed by: SURGERY

## 2019-08-28 PROCEDURE — 501570 HCHG TROCAR, SEPARATOR: Performed by: SURGERY

## 2019-08-28 PROCEDURE — 502571 HCHG PACK, LAP CHOLE: Performed by: SURGERY

## 2019-08-28 PROCEDURE — 700105 HCHG RX REV CODE 258: Performed by: SURGERY

## 2019-08-28 PROCEDURE — 160046 HCHG PACU - 1ST 60 MINS PHASE II: Performed by: SURGERY

## 2019-08-28 PROCEDURE — 501838 HCHG SUTURE GENERAL: Performed by: SURGERY

## 2019-08-28 PROCEDURE — 160009 HCHG ANES TIME/MIN: Performed by: SURGERY

## 2019-08-28 PROCEDURE — 160041 HCHG SURGERY MINUTES - EA ADDL 1 MIN LEVEL 4: Performed by: SURGERY

## 2019-08-28 PROCEDURE — 160048 HCHG OR STATISTICAL LEVEL 1-5: Performed by: SURGERY

## 2019-08-28 PROCEDURE — 160035 HCHG PACU - 1ST 60 MINS PHASE I: Performed by: SURGERY

## 2019-08-28 PROCEDURE — 160025 RECOVERY II MINUTES (STATS): Performed by: SURGERY

## 2019-08-28 PROCEDURE — 160036 HCHG PACU - EA ADDL 30 MINS PHASE I: Performed by: SURGERY

## 2019-08-28 PROCEDURE — 160002 HCHG RECOVERY MINUTES (STAT): Performed by: SURGERY

## 2019-08-28 PROCEDURE — 160029 HCHG SURGERY MINUTES - 1ST 30 MINS LEVEL 4: Performed by: SURGERY

## 2019-08-28 PROCEDURE — 700102 HCHG RX REV CODE 250 W/ 637 OVERRIDE(OP): Performed by: ANESTHESIOLOGY

## 2019-08-28 PROCEDURE — 501583 HCHG TROCAR, THRD CAN&SEAL 5X100: Performed by: SURGERY

## 2019-08-28 PROCEDURE — 700101 HCHG RX REV CODE 250: Performed by: ANESTHESIOLOGY

## 2019-08-28 PROCEDURE — A6402 STERILE GAUZE <= 16 SQ IN: HCPCS | Performed by: SURGERY

## 2019-08-28 PROCEDURE — A9270 NON-COVERED ITEM OR SERVICE: HCPCS | Performed by: ANESTHESIOLOGY

## 2019-08-28 PROCEDURE — 500868 HCHG NEEDLE, SURGI(VARES): Performed by: SURGERY

## 2019-08-28 RX ORDER — HYDROCODONE BITARTRATE AND ACETAMINOPHEN 5; 325 MG/1; MG/1
1-2 TABLET ORAL EVERY 6 HOURS PRN
Qty: 12 TAB | Refills: 0 | Status: SHIPPED | OUTPATIENT
Start: 2019-08-28 | End: 2019-08-31

## 2019-08-28 RX ORDER — DEXAMETHASONE SODIUM PHOSPHATE 4 MG/ML
INJECTION, SOLUTION INTRA-ARTICULAR; INTRALESIONAL; INTRAMUSCULAR; INTRAVENOUS; SOFT TISSUE PRN
Status: DISCONTINUED | OUTPATIENT
Start: 2019-08-28 | End: 2019-08-28 | Stop reason: SURG

## 2019-08-28 RX ORDER — ONDANSETRON 2 MG/ML
4 INJECTION INTRAMUSCULAR; INTRAVENOUS
Status: DISCONTINUED | OUTPATIENT
Start: 2019-08-28 | End: 2019-08-28 | Stop reason: HOSPADM

## 2019-08-28 RX ORDER — ONDANSETRON 2 MG/ML
INJECTION INTRAMUSCULAR; INTRAVENOUS PRN
Status: DISCONTINUED | OUTPATIENT
Start: 2019-08-28 | End: 2019-08-28 | Stop reason: SURG

## 2019-08-28 RX ORDER — CELECOXIB 200 MG/1
400 CAPSULE ORAL ONCE
Status: COMPLETED | OUTPATIENT
Start: 2019-08-28 | End: 2019-08-28

## 2019-08-28 RX ORDER — MIDAZOLAM HYDROCHLORIDE 1 MG/ML
INJECTION INTRAMUSCULAR; INTRAVENOUS PRN
Status: DISCONTINUED | OUTPATIENT
Start: 2019-08-28 | End: 2019-08-28 | Stop reason: SURG

## 2019-08-28 RX ORDER — SODIUM CHLORIDE, SODIUM LACTATE, POTASSIUM CHLORIDE, CALCIUM CHLORIDE 600; 310; 30; 20 MG/100ML; MG/100ML; MG/100ML; MG/100ML
INJECTION, SOLUTION INTRAVENOUS CONTINUOUS
Status: DISCONTINUED | OUTPATIENT
Start: 2019-08-28 | End: 2019-08-28 | Stop reason: HOSPADM

## 2019-08-28 RX ORDER — LIDOCAINE HYDROCHLORIDE 20 MG/ML
INJECTION, SOLUTION EPIDURAL; INFILTRATION; INTRACAUDAL; PERINEURAL PRN
Status: DISCONTINUED | OUTPATIENT
Start: 2019-08-28 | End: 2019-08-28 | Stop reason: SURG

## 2019-08-28 RX ORDER — CELECOXIB 200 MG/1
CAPSULE ORAL
Status: DISCONTINUED
Start: 2019-08-28 | End: 2019-08-28 | Stop reason: HOSPADM

## 2019-08-28 RX ORDER — HALOPERIDOL 5 MG/ML
1 INJECTION INTRAMUSCULAR
Status: DISCONTINUED | OUTPATIENT
Start: 2019-08-28 | End: 2019-08-28 | Stop reason: HOSPADM

## 2019-08-28 RX ORDER — MEPERIDINE HYDROCHLORIDE 25 MG/ML
6.25 INJECTION INTRAMUSCULAR; INTRAVENOUS; SUBCUTANEOUS
Status: DISCONTINUED | OUTPATIENT
Start: 2019-08-28 | End: 2019-08-28 | Stop reason: HOSPADM

## 2019-08-28 RX ORDER — BUPIVACAINE HYDROCHLORIDE AND EPINEPHRINE 5; 5 MG/ML; UG/ML
INJECTION, SOLUTION EPIDURAL; INTRACAUDAL; PERINEURAL
Status: DISCONTINUED | OUTPATIENT
Start: 2019-08-28 | End: 2019-08-28 | Stop reason: HOSPADM

## 2019-08-28 RX ORDER — DIPHENHYDRAMINE HYDROCHLORIDE 50 MG/ML
12.5 INJECTION INTRAMUSCULAR; INTRAVENOUS
Status: DISCONTINUED | OUTPATIENT
Start: 2019-08-28 | End: 2019-08-28 | Stop reason: HOSPADM

## 2019-08-28 RX ORDER — OXYCODONE HCL 5 MG/5 ML
10 SOLUTION, ORAL ORAL
Status: COMPLETED | OUTPATIENT
Start: 2019-08-28 | End: 2019-08-28

## 2019-08-28 RX ORDER — ROCURONIUM BROMIDE 10 MG/ML
INJECTION, SOLUTION INTRAVENOUS PRN
Status: DISCONTINUED | OUTPATIENT
Start: 2019-08-28 | End: 2019-08-28 | Stop reason: SURG

## 2019-08-28 RX ORDER — ACETAMINOPHEN 500 MG
1000 TABLET ORAL ONCE
Status: COMPLETED | OUTPATIENT
Start: 2019-08-28 | End: 2019-08-28

## 2019-08-28 RX ORDER — OXYCODONE HCL 5 MG/5 ML
5 SOLUTION, ORAL ORAL
Status: COMPLETED | OUTPATIENT
Start: 2019-08-28 | End: 2019-08-28

## 2019-08-28 RX ORDER — CEFAZOLIN SODIUM 1 G/3ML
INJECTION, POWDER, FOR SOLUTION INTRAMUSCULAR; INTRAVENOUS PRN
Status: DISCONTINUED | OUTPATIENT
Start: 2019-08-28 | End: 2019-08-28 | Stop reason: SURG

## 2019-08-28 RX ADMIN — SODIUM CHLORIDE, POTASSIUM CHLORIDE, SODIUM LACTATE AND CALCIUM CHLORIDE 1000 ML: 600; 310; 30; 20 INJECTION, SOLUTION INTRAVENOUS at 11:14

## 2019-08-28 RX ADMIN — FENTANYL CITRATE 50 MCG: 50 INJECTION, SOLUTION INTRAMUSCULAR; INTRAVENOUS at 14:12

## 2019-08-28 RX ADMIN — SODIUM CHLORIDE, POTASSIUM CHLORIDE, SODIUM LACTATE AND CALCIUM CHLORIDE: 600; 310; 30; 20 INJECTION, SOLUTION INTRAVENOUS at 13:09

## 2019-08-28 RX ADMIN — ONDANSETRON 4 MG: 2 INJECTION INTRAMUSCULAR; INTRAVENOUS at 13:45

## 2019-08-28 RX ADMIN — LIDOCAINE HYDROCHLORIDE 3 ML: 20 INJECTION, SOLUTION EPIDURAL; INFILTRATION; INTRACAUDAL; PERINEURAL at 13:13

## 2019-08-28 RX ADMIN — OXYCODONE HYDROCHLORIDE 10 MG: 5 SOLUTION ORAL at 14:24

## 2019-08-28 RX ADMIN — SUGAMMADEX 200 MG: 100 INJECTION, SOLUTION INTRAVENOUS at 14:06

## 2019-08-28 RX ADMIN — DEXAMETHASONE SODIUM PHOSPHATE 8 MG: 4 INJECTION, SOLUTION INTRAMUSCULAR; INTRAVENOUS at 13:23

## 2019-08-28 RX ADMIN — MIDAZOLAM HYDROCHLORIDE 1.5 MG: 1 INJECTION, SOLUTION INTRAMUSCULAR; INTRAVENOUS at 13:07

## 2019-08-28 RX ADMIN — PROPOFOL 200 MG: 10 INJECTION, EMULSION INTRAVENOUS at 13:13

## 2019-08-28 RX ADMIN — FENTANYL CITRATE 50 MCG: 50 INJECTION, SOLUTION INTRAMUSCULAR; INTRAVENOUS at 15:39

## 2019-08-28 RX ADMIN — CEFAZOLIN 2 G: 1 INJECTION, POWDER, FOR SOLUTION INTRAVENOUS at 13:21

## 2019-08-28 RX ADMIN — FENTANYL CITRATE 50 MCG: 50 INJECTION, SOLUTION INTRAMUSCULAR; INTRAVENOUS at 14:28

## 2019-08-28 RX ADMIN — ROCURONIUM BROMIDE 40 MG: 10 INJECTION, SOLUTION INTRAVENOUS at 13:13

## 2019-08-28 RX ADMIN — CELECOXIB 400 MG: 200 CAPSULE ORAL at 11:10

## 2019-08-28 RX ADMIN — FENTANYL CITRATE 50 MCG: 50 INJECTION, SOLUTION INTRAMUSCULAR; INTRAVENOUS at 13:13

## 2019-08-28 RX ADMIN — FENTANYL CITRATE 50 MCG: 50 INJECTION, SOLUTION INTRAMUSCULAR; INTRAVENOUS at 13:27

## 2019-08-28 RX ADMIN — ACETAMINOPHEN 1000 MG: 500 TABLET, FILM COATED ORAL at 11:09

## 2019-08-28 RX ADMIN — LIDOCAINE HYDROCHLORIDE 0.5 ML: 10 INJECTION, SOLUTION INFILTRATION; PERINEURAL at 11:12

## 2019-08-28 ASSESSMENT — PAIN SCALES - GENERAL: PAIN_LEVEL: 5

## 2019-08-28 NOTE — ANESTHESIA PREPROCEDURE EVALUATION
Relevant Problems   NEURO   (+) History of depression   (+) History of suicide attempt       Physical Exam    Airway   Mallampati: II  TM distance: >3 FB       Cardiovascular   Rhythm: regular  Rate: normal     Dental - normal exam         Pulmonary    Abdominal    Neurological              Anesthesia Plan    ASA 2       Plan - general       Airway plan will be ETT        Induction: intravenous    Postoperative Plan: Postoperative administration of opioids is intended.    Pertinent diagnostic labs and testing reviewed    Informed Consent:    Anesthetic plan and risks discussed with patient.

## 2019-08-28 NOTE — OP REPORT
DATE OF SERVICE:  08/28/2019    PREOPERATIVE DIAGNOSIS:  Dysphagia secondary to lap band.    POSTOPERATIVE DIAGNOSIS:  Dysphagia secondary to lap band.    PROCEDURE PERFORMED:  Laparoscopic removal of adjustable gastric band and   subcutaneous port.    SURGEON:  John H. Ganser, MD    ASSISTANT:  Kevin Cespedes PA-C    ANESTHESIA:  General.    ANESTHESIOLOGIST:  Angelica Browning MD    INDICATIONS:  The patient is a 39-year-old female who underwent lap band   surgery in [place] in 2012.  She developed significant dysphagia secondary   to the band despite it being empty.  Risks, benefits, and alternatives to   removal of her band and port were outlined in detail.  All questions answered   and she wished to proceed.    DESCRIPTION OF PROCEDURE:  The patient was identified and general anesthetic   administered.  Her abdomen was prepped and draped in the usual sterile   fashion.  Local anesthesia of 0.5% Marcaine with epinephrine was injected   prior to making skin incision.  Small incision was made in the midepigastric   region and the Veress needle passed.  The abdomen was insufflated with carbon   dioxide without incident and a 5 mm blunt trocar and 5-mm 30-degree scope   inserted.  Brissa liver retractor was passed through small subxiphoid   incision, used to elevate the lateral segment of liver.  Right upper quadrant   15 mm, and 2 left lateral abdominal 5 mm trocars were placed at the prior   laparoscopic sites.  Adhesions to the lateral segment liver were taken down   with cautery and liver retractor advanced.  Scar capsule overlying the band on   the right side was divided with cautery, staying right on the band.  This was   then carried anteriorly.  The gastropexy sutures were taken down all across   the band and the scar capsule incised on the band.  The band tubing was then   cut, the band unbuckled and was removed from around the stomach, withdrawn and   discarded.  The scar capsule on the stomach side  was then carefully dissected   off of the stomach, avoiding injury to the underlying stomach.  This was   carried over to the right lateral aspect.  On the left side, it was carried   all around as far posterior as possible.  Hemostasis was assured and the liver   retractor and trocars withdrawn.    The port was at the 15 mm trocar site and this dissection was carried down to   this.  There was a dense scar capsule around this with the apparent mesh   underlying the port.  The mesh was carefully removed leaving the fascia below   intact.  This included the port.  Hemostasis was assured.  The incisions were   closed with Vicryl.  Sterile dressings were applied.  The patient returned to   recovery room in stable condition.       ____________________________________     JOHN H. GANSER, MD [facility] / TAMARA    DD:  08/28/2019 14:12:30  DT:  08/28/2019 14:21:47    D#:  [id number]  Job#:  [id number]    cc: Yaron Murrell MD

## 2019-08-28 NOTE — ANESTHESIA PROCEDURE NOTES
Airway  Date/Time: 8/28/2019 1:10 PM  Performed by: Angelica Browning M.D.  Authorized by: Angelica Browning M.D.     Location:  OR  Urgency:  Elective  Difficult Airway: No    Indications for Airway Management:  Anesthesia  Spontaneous Ventilation: absent    Sedation Level:  Deep  Preoxygenated: Yes    Patient Position:  Sniffing  MILS Maintained Throughout: No    Mask Difficulty Assessment:  1 - vent by mask  Final Airway Type:  Endotracheal airway  Final Endotracheal Airway:  ETT  Cuffed: Yes    Technique Used for Successful ETT Placement:  Direct laryngoscopy  Blade Type:  Garcia  Laryngoscope Blade/Videolaryngoscope Blade Size:  2  ETT Size (mm):  6.5  Placement Verified by: capnometry    Cormack-Lehane Classification:  Grade I - full view of glottis  Number of Attempts at Approach:  1

## 2019-08-28 NOTE — OR NURSING
1413: To PACU from OR via gurney, respirations spontaneous and non-labored.  Surgical site on abd x5 transparent drsg over gauze, c/d/i.  Abd soft but firm. Pt drowsy and c/o of pain 6 out of 10.  1425 sat pt up. Pt moaning c/o pain in left shoulder 10 out of 10.  Able to move left arm, fingers pink and pulse palpable.   1428 med for pain  1440 Pt resting with eyes closed. O2 weaning commenced.   1455: Sleeping - not roused at this time  1510: Pt tolerating sips of water.  Pt states that pain is better, but still present.  She states that it is tolerable and would like to get up, move around and go home.Meets criteria to transfer to Stage 2

## 2019-08-28 NOTE — ANESTHESIA POSTPROCEDURE EVALUATION
Patient: Terri Alvares    Procedure Summary     Date:  08/28/19 Room / Location:   OR 01 / SURGERY [facility]    Anesthesia Start:  1309 Anesthesia Stop:  1415    Procedure:  REMOVAL, GASTRIC BAND, LAPAROSCOPIC - AND PORT (Abdomen) Diagnosis:  (GASTRIC BAND MALFUNCTION - SLIPPED PORT)    Surgeon:  John H Ganser, M.D. Responsible Provider:  Angelica Browning M.D.    Anesthesia Type:  general ASA Status:  2          Final Anesthesia Type: general  Last vitals  BP   Blood Pressure: 141/76, NIBP: 126/85    Temp   36.7 °C (98.1 °F)    Pulse   Pulse: 60, Heart Rate (Monitored): 68   Resp   (!) 22    SpO2   98 %      Anesthesia Post Evaluation    Patient location during evaluation: PACU  Patient participation: complete - patient participated  Level of consciousness: awake  Pain score: 5    Anesthetic complications: no  Cardiovascular status: adequate  Respiratory status: acceptable  Hydration status: acceptable    PONV: none           Nurse Pain Score: 10 (NPRS)

## 2019-08-28 NOTE — DISCHARGE INSTR - OTHER INFO
Clears without carbonation today. Please ensure patient able to tolerate several ounces (small sips at a time) of water/clears without dysphagia or vomiting/regurgitation before sending home.  Advance diet as tolerated to full liquids tomorrow. Continue full liquids x 2-3 days. Then, ok to advance diet as tolerated to soft foods on 8/31/19.  Up ad tejal.  Ok to Shower over Tegaderms tomorrow; remove Tegaderms on 9/01/19.  No baths or soaks x 14 days.  No driving x 4-5 days.  No lifting > 15 lbs until x 1-2 weeks.  D/C home when alert, comfortable, ambulatory, and tolerating PO well  Pt Counseled re: I.S., diet, activity, home med's, and wound care  All home med's larger in size than eraser head should be crushed or changed to liquid form x 3-4 days, while on liquid diet.  Hold MVI/supplements until after postop check.  F/U with Dr. Ganser ~ 1-2 weeks.

## 2019-08-28 NOTE — OR NURSING
1524: To stage ll. Up to chair and dressed w/ CNA assist. Pt is grimacing and moaning, states pain in shoulder is 10/10, see MAR.  1555: Pain is better. Home care instructions reviewed w/ pt and S.O. Questions, concerns addressed. Meets criteria for discharge.

## 2019-08-28 NOTE — ANESTHESIA TIME REPORT
Anesthesia Start and Stop Event Times     Date Time Event    8/28/2019 1304 Ready for Procedure     1309 Anesthesia Start        Responsible Staff  08/28/19    Name Role Begin End    Angelica Browning M.D. Anesth 1309         Preop Diagnosis (Free Text):  Pre-op Diagnosis     GASTRIC BAND MALFUNCTION - SLIPPED PORT        Preop Diagnosis (Codes):    Post op Diagnosis  Morbid obesity (HCC)      Premium Reason  Non-Premium    Comments:

## 2019-08-28 NOTE — DISCHARGE INSTRUCTIONS
ACTIVITY: Rest and take it easy for the first 24 hours.  A responsible adult is recommended to remain with you during that time.  It is normal to feel sleepy.  We encourage you to not do anything that requires balance, judgment or coordination.    MILD FLU-LIKE SYMPTOMS ARE NORMAL. YOU MAY EXPERIENCE GENERALIZED MUSCLE ACHES, THROAT IRRITATION, HEADACHE AND/OR SOME NAUSEA.    FOR 24 HOURS DO NOT:  Drive, operate machinery or run household appliances.  Drink beer or alcoholic beverages.   Make important decisions or sign legal documents.      DIET: To avoid nausea, slowly advance diet as tolerated, avoiding spicy or greasy foods for the first day.  Add more substantial food to your diet according to your physician's instructions.  INCREASE FLUIDS AND FIBER TO AVOID CONSTIPATION.    SURGICAL DRESSING/BATHING:  Keep dressing clean, dry and intact until instructed otherwise by surgeon    FOLLOW-UP APPOINTMENT:  A follow-up appointment should be arranged with your doctor, call to schedule.    You should CALL YOUR PHYSICIAN if you develop:  Fever greater than 101 degrees F.  Pain not relieved by medication, or persistent nausea or vomiting.  Excessive bleeding (blood soaking through dressing) or unexpected drainage from the wound.  Extreme redness or swelling around the incision site, drainage of pus or foul smelling drainage.  Inability to urinate or empty your bladder within 8 hours.  Problems with breathing or chest pain.    You should call 911 if you develop problems with breathing or chest pain.  If you are unable to contact your doctor or surgical center, you should go to the nearest emergency room or urgent care center.  Physician's telephone #: [phone]    If any questions arise, call your doctor.  If your doctor is not available, please feel free to call the Surgical Center at [phone].  The Center is open Monday through Friday from 7AM to 7PM.  You can also call the [employer] HOTLINE open 24 hours/day, 7  days/week and speak to a nurse at [phone], or toll free at [phone].    A registered nurse may call you a few days after your surgery to see how you are doing after your procedure.    MEDICATIONS: Resume taking daily medication.  Take prescribed pain medication with food.  If no medication is prescribed, you may take non-aspirin pain medication if needed.  PAIN MEDICATION CAN BE VERY CONSTIPATING.  Take a stool softener or laxative such as senokot, pericolace, or milk of magnesia if needed.    Prescription given for Norco.  Last pain medication given at 2:24 p.m.    If your physician has prescribed pain medication that includes Acetaminophen (Tylenol), do not take additional Acetaminophen (Tylenol) while taking the prescribed medication.    Depression / Suicide Risk    As you are discharged from this [facility] facility, it is important to learn how to keep safe from harming yourself.    Recognize the warning signs:  · Abrupt changes in personality, positive or negative- including increase in energy   · Giving away possessions  · Change in eating patterns- significant weight changes-  positive or negative  · Change in sleeping patterns- unable to sleep or sleeping all the time   · Unwillingness or inability to communicate  · Depression  · Unusual sadness, discouragement and loneliness  · Talk of wanting to die  · Neglect of personal appearance   · Rebelliousness- reckless behavior  · Withdrawal from people/activities they love  · Confusion- inability to concentrate     If you or a loved one observes any of these behaviors or has concerns about self-harm, here's what you can do:  · Talk about it- your feelings and reasons for harming yourself  · Remove any means that you might use to hurt yourself (examples: pills, rope, extension cords, firearm)  · Get professional help from the community (Mental Health, Substance Abuse, psychological counseling)  · Do not be alone:Call your Safe Contact- someone  whom you trust who will be there for you.  · Call your local CRISIS HOTLINE 991-0648 or 117-456-2652  · Call your local Children's Mobile Crisis Response Team [place] (121) 911-6421 or www.[facility]  · Call the toll free National Suicide Prevention Hotlines   · National Suicide Prevention Lifeline 934-231-RNFP (3001)  · [employer] Line Network 800-SUICIDE (214-6228)

## 2019-08-28 NOTE — OR SURGEON
Immediate Post OP Note    PreOp Diagnosis: Dysphagia secondary to Lap Band    PostOp Diagnosis: Same    Procedure(s):  REMOVAL, GASTRIC BAND, LAPAROSCOPIC - AND PORT - Wound Class: Clean    Surgeon(s):  John H Ganser, M.D.    Anesthesiologist/Type of Anesthesia:  Anesthesiologist: Angelica Browning M.D./General    Surgical Staff:  Assistant: LISA Brantley  Circulator: Ora Segovia R.N.  Scrub Person: Jean Alonso    Specimens removed if any:  * No specimens in log *    Estimated Blood Loss: 0    Findings: 0    Complications: 0        8/28/2019 2:06 PM John H Ganser, M.D.

## 2019-10-01 ENCOUNTER — NON-PROVIDER VISIT (OUTPATIENT)
Dept: URGENT CARE | Facility: PHYSICIAN GROUP | Age: 39
End: 2019-10-01

## 2019-10-01 DIAGNOSIS — Z02.1 PRE-EMPLOYMENT DRUG SCREENING: ICD-10-CM

## 2019-10-01 LAB
AMP AMPHETAMINE: NORMAL
COC COCAINE: NORMAL
INT CON NEG: NORMAL
INT CON POS: NORMAL
MET METHAMPHETAMINES: NORMAL
OPI OPIATES: NORMAL
PCP PHENCYCLIDINE: NORMAL
POC DRUG COMMENT 753798-OCCUPATIONAL HEALTH: NEGATIVE
THC: NORMAL

## 2019-10-01 PROCEDURE — 80305 DRUG TEST PRSMV DIR OPT OBS: CPT | Performed by: PHYSICIAN ASSISTANT

## 2019-10-15 ENCOUNTER — NON-PROVIDER VISIT (OUTPATIENT)
Dept: URGENT CARE | Facility: CLINIC | Age: 39
End: 2019-10-15

## 2019-10-15 DIAGNOSIS — Z02.1 PRE-EMPLOYMENT DRUG SCREENING: ICD-10-CM

## 2019-10-15 LAB
AMP AMPHETAMINE: NORMAL
BAR BARBITURATES: NORMAL
BZO BENZODIAZEPINES: NORMAL
COC COCAINE: NORMAL
INT CON NEG: NORMAL
INT CON POS: NORMAL
MDMA ECSTASY: NORMAL
MET METHAMPHETAMINES: NORMAL
MTD METHADONE: NORMAL
OPI OPIATES: NORMAL
OXY OXYCODONE: NORMAL
PCP PHENCYCLIDINE: NORMAL
POC URINE DRUG SCREEN OCDRS: NORMAL
THC: NORMAL

## 2019-10-15 PROCEDURE — 80305 DRUG TEST PRSMV DIR OPT OBS: CPT | Performed by: NURSE PRACTITIONER

## 2020-04-09 ENCOUNTER — HOSPITAL ENCOUNTER (OUTPATIENT)
Dept: LAB | Facility: MEDICAL CENTER | Age: 40
End: 2020-04-09
Attending: INTERNAL MEDICINE
Payer: OTHER GOVERNMENT

## 2020-04-09 LAB
ALBUMIN SERPL BCP-MCNC: 4.3 G/DL (ref 3.2–4.9)
ALBUMIN/GLOB SERPL: 1.3 G/DL
ALP SERPL-CCNC: 114 U/L (ref 30–99)
ALT SERPL-CCNC: 115 U/L (ref 2–50)
ANION GAP SERPL CALC-SCNC: 14 MMOL/L (ref 7–16)
AST SERPL-CCNC: 106 U/L (ref 12–45)
BASOPHILS # BLD AUTO: 0.1 % (ref 0–1.8)
BASOPHILS # BLD: 0.01 K/UL (ref 0–0.12)
BILIRUB SERPL-MCNC: 0.4 MG/DL (ref 0.1–1.5)
BUN SERPL-MCNC: 11 MG/DL (ref 8–22)
CALCIUM SERPL-MCNC: 9.2 MG/DL (ref 8.5–10.5)
CHLORIDE SERPL-SCNC: 104 MMOL/L (ref 96–112)
CO2 SERPL-SCNC: 23 MMOL/L (ref 20–33)
CREAT SERPL-MCNC: 0.61 MG/DL (ref 0.5–1.4)
EOSINOPHIL # BLD AUTO: 0.12 K/UL (ref 0–0.51)
EOSINOPHIL NFR BLD: 1.2 % (ref 0–6.9)
ERYTHROCYTE [DISTWIDTH] IN BLOOD BY AUTOMATED COUNT: 46.1 FL (ref 35.9–50)
GLOBULIN SER CALC-MCNC: 3.4 G/DL (ref 1.9–3.5)
GLUCOSE SERPL-MCNC: 105 MG/DL (ref 65–99)
HBV CORE AB SERPL QL IA: NONREACTIVE
HBV SURFACE AB SERPL IA-ACNC: 138 MIU/ML (ref 0–10)
HBV SURFACE AG SER QL: NORMAL
HCT VFR BLD AUTO: 45.1 % (ref 37–47)
HGB BLD-MCNC: 14.6 G/DL (ref 12–16)
IMM GRANULOCYTES # BLD AUTO: 0.07 K/UL (ref 0–0.11)
IMM GRANULOCYTES NFR BLD AUTO: 0.7 % (ref 0–0.9)
LYMPHOCYTES # BLD AUTO: 2.48 K/UL (ref 1–4.8)
LYMPHOCYTES NFR BLD: 24.6 % (ref 22–41)
MCH RBC QN AUTO: 29.4 PG (ref 27–33)
MCHC RBC AUTO-ENTMCNC: 32.4 G/DL (ref 33.6–35)
MCV RBC AUTO: 90.7 FL (ref 81.4–97.8)
MONOCYTES # BLD AUTO: 0.52 K/UL (ref 0–0.85)
MONOCYTES NFR BLD AUTO: 5.1 % (ref 0–13.4)
NEUTROPHILS # BLD AUTO: 6.9 K/UL (ref 2–7.15)
NEUTROPHILS NFR BLD: 68.3 % (ref 44–72)
NRBC # BLD AUTO: 0 K/UL
NRBC BLD-RTO: 0 /100 WBC
PLATELET # BLD AUTO: 254 K/UL (ref 164–446)
PMV BLD AUTO: 10.3 FL (ref 9–12.9)
POTASSIUM SERPL-SCNC: 4 MMOL/L (ref 3.6–5.5)
PROT SERPL-MCNC: 7.7 G/DL (ref 6–8.2)
RBC # BLD AUTO: 4.97 M/UL (ref 4.2–5.4)
SODIUM SERPL-SCNC: 141 MMOL/L (ref 135–145)
WBC # BLD AUTO: 10.1 K/UL (ref 4.8–10.8)

## 2020-04-09 PROCEDURE — 87902 NFCT AGT GNTYP ALYS HEP C: CPT

## 2020-04-09 PROCEDURE — 87522 HEPATITIS C REVRS TRNSCRPJ: CPT

## 2020-04-09 PROCEDURE — 87340 HEPATITIS B SURFACE AG IA: CPT

## 2020-04-09 PROCEDURE — 85025 COMPLETE CBC W/AUTO DIFF WBC: CPT

## 2020-04-09 PROCEDURE — 36415 COLL VENOUS BLD VENIPUNCTURE: CPT

## 2020-04-09 PROCEDURE — 80053 COMPREHEN METABOLIC PANEL: CPT

## 2020-04-09 PROCEDURE — 86706 HEP B SURFACE ANTIBODY: CPT

## 2020-04-09 PROCEDURE — 86704 HEP B CORE ANTIBODY TOTAL: CPT

## 2020-04-13 LAB
HCV RNA SERPL NAA+PROBE-ACNC: 828 IU/ML
HCV RNA SERPL NAA+PROBE-LOG IU: 2.92 LOG IU/ML
HCV RNA SERPL QL NAA+PROBE: DETECTED

## 2020-05-20 ENCOUNTER — HOSPITAL ENCOUNTER (OUTPATIENT)
Dept: LAB | Facility: MEDICAL CENTER | Age: 40
End: 2020-05-20
Attending: INTERNAL MEDICINE
Payer: OTHER GOVERNMENT

## 2020-05-20 PROCEDURE — 87522 HEPATITIS C REVRS TRNSCRPJ: CPT

## 2020-05-20 PROCEDURE — 36415 COLL VENOUS BLD VENIPUNCTURE: CPT

## 2020-05-23 LAB
HCV RNA SERPL NAA+PROBE-ACNC: 239 K[IU]/ML
HCV RNA SERPL NAA+PROBE-LOG IU: 2.38 LOG IU/ML
HCV RNA SERPL QL NAA+PROBE: DETECTED

## 2020-09-05 ENCOUNTER — OFFICE VISIT (OUTPATIENT)
Dept: URGENT CARE | Facility: PHYSICIAN GROUP | Age: 40
End: 2020-09-05
Payer: COMMERCIAL

## 2020-09-05 VITALS
BODY MASS INDEX: 41.11 KG/M2 | TEMPERATURE: 98.4 F | HEART RATE: 79 BPM | RESPIRATION RATE: 16 BRPM | DIASTOLIC BLOOD PRESSURE: 80 MMHG | HEIGHT: 64 IN | OXYGEN SATURATION: 98 % | SYSTOLIC BLOOD PRESSURE: 120 MMHG | WEIGHT: 240.8 LBS

## 2020-09-05 DIAGNOSIS — L73.9 ACUTE FOLLICULITIS: Primary | ICD-10-CM

## 2020-09-05 DIAGNOSIS — N89.8 ITCHING IN THE VAGINAL AREA: ICD-10-CM

## 2020-09-05 PROCEDURE — 99203 OFFICE O/P NEW LOW 30 MIN: CPT | Performed by: PHYSICIAN ASSISTANT

## 2020-09-05 RX ORDER — SULFAMETHOXAZOLE AND TRIMETHOPRIM 800; 160 MG/1; MG/1
1 TABLET ORAL 2 TIMES DAILY
Qty: 14 TAB | Refills: 0 | Status: SHIPPED | OUTPATIENT
Start: 2020-09-05 | End: 2020-09-12

## 2020-09-05 RX ORDER — FLUCONAZOLE 150 MG/1
150 TABLET ORAL DAILY
Qty: 1 TAB | Refills: 0 | Status: SHIPPED | OUTPATIENT
Start: 2020-09-05 | End: 2021-03-01

## 2020-09-05 ASSESSMENT — FIBROSIS 4 INDEX: FIB4 SCORE: 1.63

## 2020-09-05 NOTE — PROGRESS NOTES
Subjective:      Terri Alvares is a 40 y.o. female who presents with Herpes Zoster (outbreak has not had one for 2 1/2 years)    Medications:    • This patient does not have an active medication from one of the medication groupers.    Allergies: Patient has no known allergies.    Problem List: Terri Alvares has Advanced maternal age in multigravida; History of laparoscopic adjustable gastric banding; History of depression; History of suicide attempt; HSV infection; Encounter for postpartum care after hospital delivery; and Mastitis on their problem list.    Surgical History:  Past Surgical History:   Procedure Laterality Date   • PB LAP, REMOVE ADJUST ELANA RESTRICT D*  8/28/2019    Procedure: REMOVAL, GASTRIC BAND, LAPAROSCOPIC - AND PORT;  Surgeon: John H Ganser, M.D.;  Location: SURGERY [facility];  Service: General   • DILATION AND CURETTAGE  11/19/2018    Procedure: DILATION AND CURETTAGE;  Surgeon: Jatin Stapleton M.D.;  Location: LABOR AND DELIVERY;  Service: Obstetrics   • GASTRIC BANDING LAPAROSCOPIC  12/12    Lost 40#   • EYE SURGERY  2011       Past Social Hx: Terri Alvares  reports that she quit smoking about 6 years ago. Her smoking use included cigarettes. She has a 2.00 pack-year smoking history. She has never used smokeless tobacco. She reports current alcohol use. She reports that she does not use drugs.     Past Family Hx:  Terri Alvares family history includes Diabetes in her brother, maternal aunt, maternal grandmother, and mother; No Known Problems in her brother.     Problem list, medications, and allergies reviewed by myself today in Epic.           Patient presents with:    Pt co vaginal itching (like a yeast infection) and a possible pimple or ingrown hair up by pubic bone, not labia.  Pt denies blisters or typical herpes outbreak.  Pt does shave occasionally and lesion occurred after shaving.  No new partner, no dysuria.    Rash  This is a new problem.  "The current episode started in the past 7 days. The problem has been gradually worsening since onset. The affected locations include the groin. The rash is characterized by itchiness, redness and draining. It is unknown if there was an exposure to a precipitant. Pertinent negatives include no diarrhea or fever. Treatments tried: pt tried to pop \"pimple like \" lesion. The treatment provided no relief. There is no history of eczema or varicella.       Review of Systems   Constitutional: Negative for fever.   Gastrointestinal: Negative for diarrhea.   Genitourinary: Negative for dysuria, flank pain, frequency, hematuria and urgency.   Skin: Positive for itching and rash.   All other systems reviewed and are negative.         Objective:     /80   Pulse 79   Temp 36.9 °C (98.4 °F) (Temporal)   Resp 16   Ht 1.626 m (5' 4\")   Wt 109.2 kg (240 lb 12.8 oz)   SpO2 98%   BMI 41.33 kg/m²      Physical Exam  Vitals signs and nursing note reviewed. Chaperone present: declined chaperone.   Constitutional:       General: She is not in acute distress.     Appearance: Normal appearance. She is well-developed. She is obese. She is not ill-appearing or toxic-appearing.   HENT:      Head: Normocephalic and atraumatic.      Right Ear: Tympanic membrane normal.      Left Ear: Tympanic membrane normal.      Nose: Nose normal.      Mouth/Throat:      Lips: Pink.      Mouth: Mucous membranes are moist.      Pharynx: Oropharynx is clear. Uvula midline.   Eyes:      Extraocular Movements: Extraocular movements intact.      Conjunctiva/sclera: Conjunctivae normal.      Pupils: Pupils are equal, round, and reactive to light.   Neck:      Musculoskeletal: Normal range of motion and neck supple.   Cardiovascular:      Rate and Rhythm: Normal rate and regular rhythm.      Pulses: Normal pulses.      Heart sounds: Normal heart sounds.   Pulmonary:      Effort: Pulmonary effort is normal.      Breath sounds: Normal breath sounds. "   Abdominal:      General: Bowel sounds are normal.      Palpations: Abdomen is soft.   Genitourinary:     General: Normal vulva.      Exam position: Lithotomy position.      Pubic Area: No rash.       Labia:         Right: No rash.         Left: No rash.        Musculoskeletal: Normal range of motion.   Skin:     General: Skin is warm and dry.      Capillary Refill: Capillary refill takes less than 2 seconds.   Neurological:      General: No focal deficit present.      Mental Status: She is alert and oriented to person, place, and time.      Cranial Nerves: No cranial nerve deficit.      Motor: Motor function is intact.      Coordination: Coordination is intact.      Gait: Gait normal.   Psychiatric:         Mood and Affect: Mood normal.               Assessment/Plan:         1. Folliculitis of perineum  sulfamethoxazole-trimethoprim (BACTRIM DS) 800-160 MG tablet    fluconazole (DIFLUCAN) 150 MG tablet   2. Itching in the vaginal area  sulfamethoxazole-trimethoprim (BACTRIM DS) 800-160 MG tablet    fluconazole (DIFLUCAN) 150 MG tablet     PT to begin medications today as prescribed.    PT can use cool/warm compress on affected area for relief of symptoms.     Pt has an appointment with her GYN in 2 weeks, encouraged to keep appointment for remainder of well woman exam.      PT should follow up with PCP in 1-2 days for re-evaluation if symptoms have not improved.  Discussed red flags and reasons to return to UC or ED.  Pt and/or family verbalized understanding of diagnosis and follow up instructions and was offered informational handout on diagnosis.  PT discharged.

## 2020-09-09 ASSESSMENT — ENCOUNTER SYMPTOMS
FEVER: 0
DIARRHEA: 0
FLANK PAIN: 0

## 2020-09-25 ENCOUNTER — HOSPITAL ENCOUNTER (OUTPATIENT)
Dept: LAB | Facility: MEDICAL CENTER | Age: 40
End: 2020-09-25
Attending: INTERNAL MEDICINE
Payer: COMMERCIAL

## 2020-09-25 PROCEDURE — 87902 NFCT AGT GNTYP ALYS HEP C: CPT

## 2020-09-25 PROCEDURE — 87522 HEPATITIS C REVRS TRNSCRPJ: CPT

## 2020-09-25 PROCEDURE — 36415 COLL VENOUS BLD VENIPUNCTURE: CPT

## 2020-09-30 LAB
HCV RNA SERPL NAA+PROBE-ACNC: 586 K[IU]/ML
HCV RNA SERPL NAA+PROBE-LOG IU: 2.77 LOG IU/ML
HCV RNA SERPL QL NAA+PROBE: DETECTED

## 2020-10-08 LAB — HCV GENTYP SERPL NAA+PROBE: NORMAL

## 2021-03-01 ENCOUNTER — HOSPITAL ENCOUNTER (OUTPATIENT)
Dept: RADIOLOGY | Facility: MEDICAL CENTER | Age: 41
End: 2021-03-01
Attending: SURGERY
Payer: COMMERCIAL

## 2021-03-01 ENCOUNTER — PRE-ADMISSION TESTING (OUTPATIENT)
Dept: ADMISSIONS | Facility: MEDICAL CENTER | Age: 41
End: 2021-03-01
Attending: SURGERY
Payer: COMMERCIAL

## 2021-03-01 DIAGNOSIS — Z01.811 PRE-OPERATIVE RESPIRATORY EXAMINATION: ICD-10-CM

## 2021-03-01 DIAGNOSIS — Z01.810 PRE-OPERATIVE CARDIOVASCULAR EXAMINATION: ICD-10-CM

## 2021-03-01 DIAGNOSIS — Z01.812 PRE-OPERATIVE LABORATORY EXAMINATION: ICD-10-CM

## 2021-03-01 LAB
25(OH)D3 SERPL-MCNC: 23 NG/ML (ref 30–100)
ALBUMIN SERPL BCP-MCNC: 3.9 G/DL (ref 3.2–4.9)
ALBUMIN/GLOB SERPL: 1.1 G/DL
ALP SERPL-CCNC: 96 U/L (ref 30–99)
ALT SERPL-CCNC: 50 U/L (ref 2–50)
ANION GAP SERPL CALC-SCNC: 11 MMOL/L (ref 7–16)
AST SERPL-CCNC: 53 U/L (ref 12–45)
BASOPHILS # BLD AUTO: 0.3 % (ref 0–1.8)
BASOPHILS # BLD: 0.03 K/UL (ref 0–0.12)
BILIRUB SERPL-MCNC: 0.4 MG/DL (ref 0.1–1.5)
BUN SERPL-MCNC: 8 MG/DL (ref 8–22)
CALCIUM SERPL-MCNC: 9.3 MG/DL (ref 8.5–10.5)
CHLORIDE SERPL-SCNC: 104 MMOL/L (ref 96–112)
CHOLEST SERPL-MCNC: 142 MG/DL (ref 100–199)
CO2 SERPL-SCNC: 24 MMOL/L (ref 20–33)
CREAT SERPL-MCNC: 0.45 MG/DL (ref 0.5–1.4)
EOSINOPHIL # BLD AUTO: 0.18 K/UL (ref 0–0.51)
EOSINOPHIL NFR BLD: 1.8 % (ref 0–6.9)
ERYTHROCYTE [DISTWIDTH] IN BLOOD BY AUTOMATED COUNT: 47.7 FL (ref 35.9–50)
FERRITIN SERPL-MCNC: 162 NG/ML (ref 10–291)
FOLATE SERPL-MCNC: 24.1 NG/ML
GLOBULIN SER CALC-MCNC: 3.7 G/DL (ref 1.9–3.5)
GLUCOSE SERPL-MCNC: 99 MG/DL (ref 65–99)
HCT VFR BLD AUTO: 43.5 % (ref 37–47)
HDLC SERPL-MCNC: 35 MG/DL
HGB BLD-MCNC: 13.9 G/DL (ref 12–16)
IMM GRANULOCYTES # BLD AUTO: 0.06 K/UL (ref 0–0.11)
IMM GRANULOCYTES NFR BLD AUTO: 0.6 % (ref 0–0.9)
IRON SATN MFR SERPL: 36 % (ref 15–55)
IRON SERPL-MCNC: 90 UG/DL (ref 40–170)
LDLC SERPL CALC-MCNC: 85 MG/DL
LYMPHOCYTES # BLD AUTO: 3.64 K/UL (ref 1–4.8)
LYMPHOCYTES NFR BLD: 36.7 % (ref 22–41)
MCH RBC QN AUTO: 29.6 PG (ref 27–33)
MCHC RBC AUTO-ENTMCNC: 32 G/DL (ref 33.6–35)
MCV RBC AUTO: 92.6 FL (ref 81.4–97.8)
MONOCYTES # BLD AUTO: 0.64 K/UL (ref 0–0.85)
MONOCYTES NFR BLD AUTO: 6.5 % (ref 0–13.4)
NEUTROPHILS # BLD AUTO: 5.36 K/UL (ref 2–7.15)
NEUTROPHILS NFR BLD: 54.1 % (ref 44–72)
NRBC # BLD AUTO: 0 K/UL
NRBC BLD-RTO: 0 /100 WBC
PLATELET # BLD AUTO: 205 K/UL (ref 164–446)
PMV BLD AUTO: 10.7 FL (ref 9–12.9)
POTASSIUM SERPL-SCNC: 3.9 MMOL/L (ref 3.6–5.5)
PREALB SERPL-MCNC: 18.3 MG/DL (ref 18–38)
PROT SERPL-MCNC: 7.6 G/DL (ref 6–8.2)
PTH-INTACT SERPL-MCNC: 26.4 PG/ML (ref 14–72)
RBC # BLD AUTO: 4.7 M/UL (ref 4.2–5.4)
SODIUM SERPL-SCNC: 139 MMOL/L (ref 135–145)
TIBC SERPL-MCNC: 252 UG/DL (ref 250–450)
TRIGL SERPL-MCNC: 110 MG/DL (ref 0–149)
UIBC SERPL-MCNC: 162 UG/DL (ref 110–370)
VIT B12 SERPL-MCNC: 302 PG/ML (ref 211–911)
WBC # BLD AUTO: 9.9 K/UL (ref 4.8–10.8)

## 2021-03-01 PROCEDURE — 71046 X-RAY EXAM CHEST 2 VIEWS: CPT

## 2021-03-01 PROCEDURE — 83550 IRON BINDING TEST: CPT

## 2021-03-01 PROCEDURE — 36415 COLL VENOUS BLD VENIPUNCTURE: CPT

## 2021-03-01 PROCEDURE — 83970 ASSAY OF PARATHORMONE: CPT

## 2021-03-01 PROCEDURE — 82728 ASSAY OF FERRITIN: CPT

## 2021-03-01 PROCEDURE — 84425 ASSAY OF VITAMIN B-1: CPT

## 2021-03-01 PROCEDURE — 82746 ASSAY OF FOLIC ACID SERUM: CPT

## 2021-03-01 PROCEDURE — 83540 ASSAY OF IRON: CPT

## 2021-03-01 PROCEDURE — 93005 ELECTROCARDIOGRAM TRACING: CPT

## 2021-03-01 PROCEDURE — 82607 VITAMIN B-12: CPT

## 2021-03-01 PROCEDURE — 85025 COMPLETE CBC W/AUTO DIFF WBC: CPT

## 2021-03-01 PROCEDURE — 82306 VITAMIN D 25 HYDROXY: CPT

## 2021-03-01 PROCEDURE — 84134 ASSAY OF PREALBUMIN: CPT

## 2021-03-01 PROCEDURE — 80053 COMPREHEN METABOLIC PANEL: CPT

## 2021-03-01 PROCEDURE — 80061 LIPID PANEL: CPT

## 2021-03-01 RX ORDER — ONDANSETRON 4 MG/1
TABLET, FILM COATED ORAL
Status: ON HOLD | COMMUNITY
Start: 2021-02-15 | End: 2021-03-08

## 2021-03-01 RX ORDER — OMEPRAZOLE 20 MG/1
CAPSULE, DELAYED RELEASE ORAL
Status: ON HOLD | COMMUNITY
Start: 2021-02-15 | End: 2021-03-08

## 2021-03-01 RX ORDER — IODINE/SODIUM IODIDE 2 %
125 TINCTURE TOPICAL
Status: ON HOLD | COMMUNITY
Start: 2021-02-15 | End: 2021-03-08

## 2021-03-01 RX ORDER — LORATADINE 10 MG/1
10 TABLET ORAL EVERY MORNING
COMMUNITY
End: 2021-12-01

## 2021-03-01 ASSESSMENT — FIBROSIS 4 INDEX: FIB4 SCORE: 1.63

## 2021-03-02 LAB — EKG IMPRESSION: NORMAL

## 2021-03-02 PROCEDURE — 93010 ELECTROCARDIOGRAM REPORT: CPT | Performed by: INTERNAL MEDICINE

## 2021-03-04 LAB — VIT B1 BLD-MCNC: 98 NMOL/L (ref 70–180)

## 2021-03-08 ENCOUNTER — ANESTHESIA (OUTPATIENT)
Dept: SURGERY | Facility: MEDICAL CENTER | Age: 41
End: 2021-03-08
Payer: COMMERCIAL

## 2021-03-08 ENCOUNTER — HOSPITAL ENCOUNTER (OUTPATIENT)
Facility: MEDICAL CENTER | Age: 41
End: 2021-03-09
Attending: SURGERY | Admitting: SURGERY
Payer: COMMERCIAL

## 2021-03-08 ENCOUNTER — ANESTHESIA EVENT (OUTPATIENT)
Dept: SURGERY | Facility: MEDICAL CENTER | Age: 41
End: 2021-03-08
Payer: COMMERCIAL

## 2021-03-08 DIAGNOSIS — G89.18 POSTOPERATIVE PAIN: ICD-10-CM

## 2021-03-08 LAB
HCG UR QL: NEGATIVE
PATHOLOGY CONSULT NOTE: NORMAL

## 2021-03-08 PROCEDURE — 88305 TISSUE EXAM BY PATHOLOGIST: CPT

## 2021-03-08 PROCEDURE — 160048 HCHG OR STATISTICAL LEVEL 1-5: Performed by: SURGERY

## 2021-03-08 PROCEDURE — 501664 HCHG TUBING, FILTER STRYKER: Performed by: SURGERY

## 2021-03-08 PROCEDURE — 700111 HCHG RX REV CODE 636 W/ 250 OVERRIDE (IP): Performed by: PHYSICIAN ASSISTANT

## 2021-03-08 PROCEDURE — 501570 HCHG TROCAR, SEPARATOR: Performed by: SURGERY

## 2021-03-08 PROCEDURE — 700101 HCHG RX REV CODE 250: Performed by: ANESTHESIOLOGY

## 2021-03-08 PROCEDURE — 700111 HCHG RX REV CODE 636 W/ 250 OVERRIDE (IP): Performed by: ANESTHESIOLOGY

## 2021-03-08 PROCEDURE — A9270 NON-COVERED ITEM OR SERVICE: HCPCS | Performed by: SURGERY

## 2021-03-08 PROCEDURE — 160035 HCHG PACU - 1ST 60 MINS PHASE I: Performed by: SURGERY

## 2021-03-08 PROCEDURE — 96374 THER/PROPH/DIAG INJ IV PUSH: CPT

## 2021-03-08 PROCEDURE — 501571 HCHG TROCAR, SEPARATOR 12X100: Performed by: SURGERY

## 2021-03-08 PROCEDURE — 700105 HCHG RX REV CODE 258: Performed by: PHYSICIAN ASSISTANT

## 2021-03-08 PROCEDURE — 96375 TX/PRO/DX INJ NEW DRUG ADDON: CPT

## 2021-03-08 PROCEDURE — 160041 HCHG SURGERY MINUTES - EA ADDL 1 MIN LEVEL 4: Performed by: SURGERY

## 2021-03-08 PROCEDURE — 502570 HCHG PACK, GASTRIC BANDING: Performed by: SURGERY

## 2021-03-08 PROCEDURE — 160009 HCHG ANES TIME/MIN: Performed by: SURGERY

## 2021-03-08 PROCEDURE — 501838 HCHG SUTURE GENERAL: Performed by: SURGERY

## 2021-03-08 PROCEDURE — 160029 HCHG SURGERY MINUTES - 1ST 30 MINS LEVEL 4: Performed by: SURGERY

## 2021-03-08 PROCEDURE — 81025 URINE PREGNANCY TEST: CPT

## 2021-03-08 PROCEDURE — 700101 HCHG RX REV CODE 250: Performed by: SURGERY

## 2021-03-08 PROCEDURE — 160002 HCHG RECOVERY MINUTES (STAT): Performed by: SURGERY

## 2021-03-08 PROCEDURE — 500868 HCHG NEEDLE, SURGI(VARES): Performed by: SURGERY

## 2021-03-08 PROCEDURE — 160036 HCHG PACU - EA ADDL 30 MINS PHASE I: Performed by: SURGERY

## 2021-03-08 PROCEDURE — G0378 HOSPITAL OBSERVATION PER HR: HCPCS

## 2021-03-08 PROCEDURE — 700111 HCHG RX REV CODE 636 W/ 250 OVERRIDE (IP): Performed by: SURGERY

## 2021-03-08 PROCEDURE — 700105 HCHG RX REV CODE 258: Performed by: SURGERY

## 2021-03-08 PROCEDURE — 96376 TX/PRO/DX INJ SAME DRUG ADON: CPT

## 2021-03-08 PROCEDURE — 500522 HCHG ENDOSTITCH SUTURING DEVICE: Performed by: SURGERY

## 2021-03-08 PROCEDURE — 700102 HCHG RX REV CODE 250 W/ 637 OVERRIDE(OP): Performed by: SURGERY

## 2021-03-08 RX ORDER — SODIUM CHLORIDE, SODIUM LACTATE, POTASSIUM CHLORIDE, AND CALCIUM CHLORIDE .6; .31; .03; .02 G/100ML; G/100ML; G/100ML; G/100ML
500 INJECTION, SOLUTION INTRAVENOUS
Status: DISCONTINUED | OUTPATIENT
Start: 2021-03-08 | End: 2021-03-09 | Stop reason: HOSPADM

## 2021-03-08 RX ORDER — SODIUM CHLORIDE, SODIUM LACTATE, POTASSIUM CHLORIDE, CALCIUM CHLORIDE 600; 310; 30; 20 MG/100ML; MG/100ML; MG/100ML; MG/100ML
INJECTION, SOLUTION INTRAVENOUS CONTINUOUS
Status: DISCONTINUED | OUTPATIENT
Start: 2021-03-08 | End: 2021-03-09 | Stop reason: HOSPADM

## 2021-03-08 RX ORDER — ONDANSETRON 2 MG/ML
INJECTION INTRAMUSCULAR; INTRAVENOUS PRN
Status: DISCONTINUED | OUTPATIENT
Start: 2021-03-08 | End: 2021-03-08 | Stop reason: SURG

## 2021-03-08 RX ORDER — ONDANSETRON 2 MG/ML
4 INJECTION INTRAMUSCULAR; INTRAVENOUS EVERY 4 HOURS PRN
Status: DISCONTINUED | OUTPATIENT
Start: 2021-03-08 | End: 2021-03-09 | Stop reason: HOSPADM

## 2021-03-08 RX ORDER — SODIUM CHLORIDE, SODIUM LACTATE, POTASSIUM CHLORIDE, CALCIUM CHLORIDE 600; 310; 30; 20 MG/100ML; MG/100ML; MG/100ML; MG/100ML
INJECTION, SOLUTION INTRAVENOUS CONTINUOUS
Status: DISCONTINUED | OUTPATIENT
Start: 2021-03-08 | End: 2021-03-08 | Stop reason: HOSPADM

## 2021-03-08 RX ORDER — OXYCODONE HCL 5 MG/5 ML
10 SOLUTION, ORAL ORAL
Status: COMPLETED | OUTPATIENT
Start: 2021-03-08 | End: 2021-03-08

## 2021-03-08 RX ORDER — HYDROMORPHONE HYDROCHLORIDE 1 MG/ML
0.2 INJECTION, SOLUTION INTRAMUSCULAR; INTRAVENOUS; SUBCUTANEOUS
Status: DISCONTINUED | OUTPATIENT
Start: 2021-03-08 | End: 2021-03-08 | Stop reason: HOSPADM

## 2021-03-08 RX ORDER — DIPHENHYDRAMINE HYDROCHLORIDE 50 MG/ML
12.5 INJECTION INTRAMUSCULAR; INTRAVENOUS EVERY 6 HOURS PRN
Status: DISCONTINUED | OUTPATIENT
Start: 2021-03-08 | End: 2021-03-09 | Stop reason: HOSPADM

## 2021-03-08 RX ORDER — SCOLOPAMINE TRANSDERMAL SYSTEM 1 MG/1
1 PATCH, EXTENDED RELEASE TRANSDERMAL
Status: DISCONTINUED | OUTPATIENT
Start: 2021-03-08 | End: 2021-03-09 | Stop reason: HOSPADM

## 2021-03-08 RX ORDER — LABETALOL HYDROCHLORIDE 5 MG/ML
10 INJECTION, SOLUTION INTRAVENOUS EVERY 6 HOURS PRN
Status: DISCONTINUED | OUTPATIENT
Start: 2021-03-08 | End: 2021-03-09 | Stop reason: HOSPADM

## 2021-03-08 RX ORDER — ROCURONIUM BROMIDE 10 MG/ML
INJECTION, SOLUTION INTRAVENOUS PRN
Status: DISCONTINUED | OUTPATIENT
Start: 2021-03-08 | End: 2021-03-08 | Stop reason: SURG

## 2021-03-08 RX ORDER — SIMETHICONE 80 MG
80 TABLET,CHEWABLE ORAL 3 TIMES DAILY PRN
Status: DISCONTINUED | OUTPATIENT
Start: 2021-03-08 | End: 2021-03-09 | Stop reason: HOSPADM

## 2021-03-08 RX ORDER — HYDROMORPHONE HYDROCHLORIDE 1 MG/ML
0.4 INJECTION, SOLUTION INTRAMUSCULAR; INTRAVENOUS; SUBCUTANEOUS
Status: DISCONTINUED | OUTPATIENT
Start: 2021-03-08 | End: 2021-03-08 | Stop reason: HOSPADM

## 2021-03-08 RX ORDER — HYDRALAZINE HYDROCHLORIDE 20 MG/ML
10 INJECTION INTRAMUSCULAR; INTRAVENOUS EVERY 6 HOURS PRN
Status: DISCONTINUED | OUTPATIENT
Start: 2021-03-08 | End: 2021-03-09 | Stop reason: HOSPADM

## 2021-03-08 RX ORDER — BUPIVACAINE HYDROCHLORIDE AND EPINEPHRINE 5; 5 MG/ML; UG/ML
INJECTION, SOLUTION EPIDURAL; INTRACAUDAL; PERINEURAL
Status: DISCONTINUED | OUTPATIENT
Start: 2021-03-08 | End: 2021-03-08 | Stop reason: HOSPADM

## 2021-03-08 RX ORDER — HALOPERIDOL 5 MG/ML
1 INJECTION INTRAMUSCULAR
Status: DISCONTINUED | OUTPATIENT
Start: 2021-03-08 | End: 2021-03-08 | Stop reason: HOSPADM

## 2021-03-08 RX ORDER — ACETAMINOPHEN 10 MG/ML
1 INJECTION, SOLUTION INTRAVENOUS
Status: COMPLETED | OUTPATIENT
Start: 2021-03-08 | End: 2021-03-08

## 2021-03-08 RX ORDER — ONDANSETRON 4 MG/1
4 TABLET, ORALLY DISINTEGRATING ORAL EVERY 4 HOURS PRN
Status: DISCONTINUED | OUTPATIENT
Start: 2021-03-08 | End: 2021-03-09 | Stop reason: HOSPADM

## 2021-03-08 RX ORDER — CEFAZOLIN SODIUM 1 G/3ML
INJECTION, POWDER, FOR SOLUTION INTRAMUSCULAR; INTRAVENOUS PRN
Status: DISCONTINUED | OUTPATIENT
Start: 2021-03-08 | End: 2021-03-08 | Stop reason: SURG

## 2021-03-08 RX ORDER — SODIUM CHLORIDE, SODIUM LACTATE, POTASSIUM CHLORIDE, CALCIUM CHLORIDE 600; 310; 30; 20 MG/100ML; MG/100ML; MG/100ML; MG/100ML
INJECTION, SOLUTION INTRAVENOUS CONTINUOUS
Status: ACTIVE | OUTPATIENT
Start: 2021-03-08 | End: 2021-03-08

## 2021-03-08 RX ORDER — DIPHENHYDRAMINE HYDROCHLORIDE 50 MG/ML
12.5 INJECTION INTRAMUSCULAR; INTRAVENOUS
Status: DISCONTINUED | OUTPATIENT
Start: 2021-03-08 | End: 2021-03-08 | Stop reason: HOSPADM

## 2021-03-08 RX ORDER — HYDROMORPHONE HYDROCHLORIDE 1 MG/ML
0.1 INJECTION, SOLUTION INTRAMUSCULAR; INTRAVENOUS; SUBCUTANEOUS
Status: DISCONTINUED | OUTPATIENT
Start: 2021-03-08 | End: 2021-03-08 | Stop reason: HOSPADM

## 2021-03-08 RX ORDER — LIDOCAINE HYDROCHLORIDE 20 MG/ML
INJECTION, SOLUTION EPIDURAL; INFILTRATION; INTRACAUDAL; PERINEURAL PRN
Status: DISCONTINUED | OUTPATIENT
Start: 2021-03-08 | End: 2021-03-08 | Stop reason: SURG

## 2021-03-08 RX ORDER — MIDAZOLAM HYDROCHLORIDE 1 MG/ML
INJECTION INTRAMUSCULAR; INTRAVENOUS PRN
Status: DISCONTINUED | OUTPATIENT
Start: 2021-03-08 | End: 2021-03-08 | Stop reason: SURG

## 2021-03-08 RX ORDER — KETOROLAC TROMETHAMINE 30 MG/ML
30 INJECTION, SOLUTION INTRAMUSCULAR; INTRAVENOUS EVERY 6 HOURS PRN
Status: DISCONTINUED | OUTPATIENT
Start: 2021-03-08 | End: 2021-03-09 | Stop reason: HOSPADM

## 2021-03-08 RX ORDER — LORAZEPAM 2 MG/ML
.5-1 INJECTION INTRAMUSCULAR EVERY 4 HOURS PRN
Status: DISCONTINUED | OUTPATIENT
Start: 2021-03-08 | End: 2021-03-09 | Stop reason: HOSPADM

## 2021-03-08 RX ORDER — OXYCODONE HCL 5 MG/5 ML
5 SOLUTION, ORAL ORAL
Status: COMPLETED | OUTPATIENT
Start: 2021-03-08 | End: 2021-03-08

## 2021-03-08 RX ORDER — MORPHINE SULFATE 4 MG/ML
1-5 INJECTION, SOLUTION INTRAMUSCULAR; INTRAVENOUS
Status: DISCONTINUED | OUTPATIENT
Start: 2021-03-08 | End: 2021-03-09 | Stop reason: HOSPADM

## 2021-03-08 RX ORDER — ONDANSETRON 2 MG/ML
4 INJECTION INTRAMUSCULAR; INTRAVENOUS
Status: COMPLETED | OUTPATIENT
Start: 2021-03-08 | End: 2021-03-08

## 2021-03-08 RX ORDER — ACETAMINOPHEN 10 MG/ML
1000 INJECTION, SOLUTION INTRAVENOUS EVERY 6 HOURS
Status: COMPLETED | OUTPATIENT
Start: 2021-03-08 | End: 2021-03-08

## 2021-03-08 RX ORDER — DEXAMETHASONE SODIUM PHOSPHATE 4 MG/ML
INJECTION, SOLUTION INTRA-ARTICULAR; INTRALESIONAL; INTRAMUSCULAR; INTRAVENOUS; SOFT TISSUE PRN
Status: DISCONTINUED | OUTPATIENT
Start: 2021-03-08 | End: 2021-03-08 | Stop reason: SURG

## 2021-03-08 RX ORDER — PROMETHAZINE HYDROCHLORIDE 25 MG/1
25 SUPPOSITORY RECTAL EVERY 4 HOURS PRN
Status: DISCONTINUED | OUTPATIENT
Start: 2021-03-08 | End: 2021-03-09 | Stop reason: HOSPADM

## 2021-03-08 RX ADMIN — POVIDONE IODINE 15 ML: 100 SOLUTION TOPICAL at 06:39

## 2021-03-08 RX ADMIN — FENTANYL CITRATE 50 MCG: 50 INJECTION, SOLUTION INTRAMUSCULAR; INTRAVENOUS at 08:00

## 2021-03-08 RX ADMIN — CEFAZOLIN 2 G: 330 INJECTION, POWDER, FOR SOLUTION INTRAMUSCULAR; INTRAVENOUS at 07:30

## 2021-03-08 RX ADMIN — LIDOCAINE HYDROCHLORIDE 100 MG: 20 INJECTION, SOLUTION EPIDURAL; INFILTRATION; INTRACAUDAL at 07:37

## 2021-03-08 RX ADMIN — ACETAMINOPHEN 1000 MG: 10 INJECTION, SOLUTION INTRAVENOUS at 14:19

## 2021-03-08 RX ADMIN — FENTANYL CITRATE 50 MCG: 50 INJECTION, SOLUTION INTRAMUSCULAR; INTRAVENOUS at 08:24

## 2021-03-08 RX ADMIN — MIDAZOLAM HYDROCHLORIDE 2 MG: 1 INJECTION, SOLUTION INTRAMUSCULAR; INTRAVENOUS at 07:37

## 2021-03-08 RX ADMIN — FENTANYL CITRATE 100 MCG: 50 INJECTION, SOLUTION INTRAMUSCULAR; INTRAVENOUS at 07:45

## 2021-03-08 RX ADMIN — KETOROLAC TROMETHAMINE 30 MG: 30 INJECTION, SOLUTION INTRAMUSCULAR; INTRAVENOUS at 16:55

## 2021-03-08 RX ADMIN — HYDROMORPHONE HYDROCHLORIDE 0.4 MG: 1 INJECTION, SOLUTION INTRAMUSCULAR; INTRAVENOUS; SUBCUTANEOUS at 12:00

## 2021-03-08 RX ADMIN — ONDANSETRON 4 MG: 2 INJECTION INTRAMUSCULAR; INTRAVENOUS at 07:37

## 2021-03-08 RX ADMIN — HALOPERIDOL LACTATE 1 MG: 5 INJECTION, SOLUTION INTRAMUSCULAR at 09:03

## 2021-03-08 RX ADMIN — METHOCARBAMOL 1000 MG: 100 INJECTION INTRAMUSCULAR; INTRAVENOUS at 09:29

## 2021-03-08 RX ADMIN — SUGAMMADEX 200 MG: 100 INJECTION, SOLUTION INTRAVENOUS at 08:28

## 2021-03-08 RX ADMIN — FENTANYL CITRATE 50 MCG: 50 INJECTION, SOLUTION INTRAMUSCULAR; INTRAVENOUS at 08:55

## 2021-03-08 RX ADMIN — ROCURONIUM BROMIDE 40 MG: 10 INJECTION, SOLUTION INTRAVENOUS at 07:37

## 2021-03-08 RX ADMIN — FENTANYL CITRATE 100 MCG: 50 INJECTION, SOLUTION INTRAMUSCULAR; INTRAVENOUS at 07:37

## 2021-03-08 RX ADMIN — SCOPALAMINE 1 PATCH: 1 PATCH, EXTENDED RELEASE TRANSDERMAL at 06:39

## 2021-03-08 RX ADMIN — ACETAMINOPHEN 1000 MG: 10 INJECTION, SOLUTION INTRAVENOUS at 16:55

## 2021-03-08 RX ADMIN — FAMOTIDINE 20 MG: 10 INJECTION INTRAVENOUS at 16:55

## 2021-03-08 RX ADMIN — DEXAMETHASONE SODIUM PHOSPHATE 4 MG: 4 INJECTION, SOLUTION INTRA-ARTICULAR; INTRALESIONAL; INTRAMUSCULAR; INTRAVENOUS; SOFT TISSUE at 07:37

## 2021-03-08 RX ADMIN — ACETAMINOPHEN 1 G: 10 INJECTION, SOLUTION INTRAVENOUS at 06:39

## 2021-03-08 RX ADMIN — SODIUM CHLORIDE, POTASSIUM CHLORIDE, SODIUM LACTATE AND CALCIUM CHLORIDE: 600; 310; 30; 20 INJECTION, SOLUTION INTRAVENOUS at 14:20

## 2021-03-08 RX ADMIN — ONDANSETRON 4 MG: 2 INJECTION INTRAMUSCULAR; INTRAVENOUS at 08:55

## 2021-03-08 RX ADMIN — MORPHINE SULFATE 4 MG: 4 INJECTION INTRAVENOUS at 17:21

## 2021-03-08 RX ADMIN — HYDROMORPHONE HYDROCHLORIDE 0.4 MG: 1 INJECTION, SOLUTION INTRAMUSCULAR; INTRAVENOUS; SUBCUTANEOUS at 09:30

## 2021-03-08 RX ADMIN — SODIUM CHLORIDE, POTASSIUM CHLORIDE, SODIUM LACTATE AND CALCIUM CHLORIDE: 600; 310; 30; 20 INJECTION, SOLUTION INTRAVENOUS at 06:39

## 2021-03-08 ASSESSMENT — PATIENT HEALTH QUESTIONNAIRE - PHQ9
1. LITTLE INTEREST OR PLEASURE IN DOING THINGS: NOT AT ALL
SUM OF ALL RESPONSES TO PHQ9 QUESTIONS 1 AND 2: 0
2. FEELING DOWN, DEPRESSED, IRRITABLE, OR HOPELESS: NOT AT ALL

## 2021-03-08 ASSESSMENT — LIFESTYLE VARIABLES
TOTAL SCORE: 0
CONSUMPTION TOTAL: NEGATIVE
EVER FELT BAD OR GUILTY ABOUT YOUR DRINKING: NO
AVERAGE NUMBER OF DAYS PER WEEK YOU HAVE A DRINK CONTAINING ALCOHOL: 0
ALCOHOL_USE: NO
HOW MANY TIMES IN THE PAST YEAR HAVE YOU HAD 5 OR MORE DRINKS IN A DAY: 0
HAVE YOU EVER FELT YOU SHOULD CUT DOWN ON YOUR DRINKING: NO
TOTAL SCORE: 0
TOTAL SCORE: 0
ON A TYPICAL DAY WHEN YOU DRINK ALCOHOL HOW MANY DRINKS DO YOU HAVE: 0
HAVE PEOPLE ANNOYED YOU BY CRITICIZING YOUR DRINKING: NO
EVER HAD A DRINK FIRST THING IN THE MORNING TO STEADY YOUR NERVES TO GET RID OF A HANGOVER: NO
DOES PATIENT WANT TO STOP DRINKING: NO

## 2021-03-08 ASSESSMENT — COGNITIVE AND FUNCTIONAL STATUS - GENERAL
HELP NEEDED FOR BATHING: A LITTLE
DAILY ACTIVITIY SCORE: 22
CLIMB 3 TO 5 STEPS WITH RAILING: A LITTLE
MOBILITY SCORE: 21
SUGGESTED CMS G CODE MODIFIER DAILY ACTIVITY: CJ
WALKING IN HOSPITAL ROOM: A LITTLE
DRESSING REGULAR LOWER BODY CLOTHING: A LITTLE
STANDING UP FROM CHAIR USING ARMS: A LITTLE
SUGGESTED CMS G CODE MODIFIER MOBILITY: CJ

## 2021-03-08 ASSESSMENT — PAIN DESCRIPTION - PAIN TYPE
TYPE: SURGICAL PAIN
TYPE: SURGICAL PAIN
TYPE: ACUTE PAIN
TYPE: SURGICAL PAIN
TYPE: ACUTE PAIN
TYPE: ACUTE PAIN;SURGICAL PAIN
TYPE: ACUTE PAIN;SURGICAL PAIN
TYPE: SURGICAL PAIN

## 2021-03-08 ASSESSMENT — FIBROSIS 4 INDEX: FIB4 SCORE: 1.54

## 2021-03-08 ASSESSMENT — PAIN SCALES - GENERAL: PAIN_LEVEL: 0

## 2021-03-08 NOTE — PROGRESS NOTES
2 RN skin check complete.   Devices in place SCDs, pulse ox, PIV, gown.  Skin assessed under devices and all bony prominences.  Only findings were expected surgical incisions.   Pt completes self-turns.

## 2021-03-08 NOTE — ANESTHESIA PROCEDURE NOTES
Airway    Date/Time: 3/8/2021 7:37 AM  Performed by: Bhaskar Malin M.D.  Authorized by: Bhaskar Malin M.D.     Location:  OR  Urgency:  Elective  Indications for Airway Management:  Anesthesia      Spontaneous Ventilation: absent    Sedation Level:  Deep  Preoxygenated: Yes    Patient Position:  Sniffing  Final Airway Type:  Endotracheal airway  Final Endotracheal Airway:  ETT  Cuffed: Yes    Technique Used for Successful ETT Placement:  Direct laryngoscopy    Insertion Site:  Oral  Blade Type:  Garcia  Laryngoscope Blade/Videolaryngoscope Blade Size:  2  ETT Size (mm):  7.0  Measured from:  Teeth  ETT to Teeth (cm):  23  Placement Verified by: auscultation and capnometry    Cormack-Lehane Classification:  Grade I - full view of glottis  Number of Attempts at Approach:  1

## 2021-03-08 NOTE — OP REPORT
Operative Report    Date: 3/8/2021    Surgeon: John Ganser M.D.    Assistant: Kevin Cespedes PA-C    Anesthesia: Dr. Malin    Preoperative Diagnosis: Morbid Obesity, depression, irregular menses, headaches    Postoperative Diagnosis: Same    Procedure Performed: Laparoscopic Vertical Sleeve Gastrectomy    Indications: The patient is suffering from morbid obesity and it's sequelae with a body mass index of 41 kg/m2. They have failed dietary attempts at weight loss and have been fully counseled about risks and alternatives to sleeve gastrectomy and wish to proceed.    Findings: No hiatal hernia    DESCRIPTION OF PROCEDURE: The patient was identified and general anesthetic   administered. Her abdomen was prepped and draped in the usual sterile   fashion. Local anesthesia of 0.5% Marcaine with epinephrine was injected   prior to making skin incision. Small incision was made to left midline in low  epigastric region and the Veress needle passed. The abdomen was insufflated   with carbon dioxide without incident and a 5-mm blunt trocar and 5-mm   30-degree scope inserted. The Brissa liver retractor was passed through a   small subxiphoid incision and used to elevate the lateral segment of liver and was held with the robotic arm. Right upper quadrant 12 mm, left upper   quadrant 15 mm and left lateral subcostal 5 mm trocars were placed. Inspection   of the hiatus showed no evidence of a hiatal hernia. Scarring from previous Lap Band was divided around the upper stomach. The gastropexy sutures had been taken down previously at the time of band removal. The omentum was then taken down off the greater curve of the stomach using the Harmonic scalpel.     A 40-Greek bougie was then passed by anesthesia and laid along the lesser curve of the stomach. The Townsend 60 power stapler using a green load was then placed about 4-5 cm proximal to the pylorus and positioned adjacent to the bougie and fired. The sleeve was completed  with sequential firing of the stapler using green and gold loads. A  small cuff of stomach was left adjacent to the esophagus. The distal stomach was removed through the 15 mm trocar site.     The staple line was then oversewn with a 3-0 absorbable V-Loc suture using the endostitch deviceincorporating the omentum along the way. The bougie was removed and the sleeve maintained good orientation with no torsion or kinks. The abdomen was irrigated and hemostasis assured. The trocars were removed. The abdomen deflated, and incisions closed with Vicryl. Sterile dressings were applied.   The patient returned to recovery room in stable condition.        ____________________________________  JOHN H. GANSER, MD John Ganser, M.D., F.A.C.S.  [facility]  [facility]  [phone]

## 2021-03-08 NOTE — ANESTHESIA POSTPROCEDURE EVALUATION
Patient: Terri Alvares    Procedure Summary     Date: 03/08/21 Room / Location: [place] [address] / SURGERY [facility]    Anesthesia Start: 0730 Anesthesia Stop: 0851    Procedure: GASTRECTOMY, SLEEVE, LAPAROSCOPIC. (N/A Abdomen) Diagnosis: (MORBID OBESITY)    Surgeons: John H Ganser, M.D. Responsible Provider: Bhaskar Malin M.D.    Anesthesia Type: general ASA Status: 3          Final Anesthesia Type: general  Last vitals  BP   Blood Pressure: 127/89    Temp   36.6 °C (97.9 °F)    Pulse   87   Resp   18    SpO2   96 %      Anesthesia Post Evaluation    Patient location during evaluation: PACU  Patient participation: complete - patient participated  Level of consciousness: awake and alert  Pain score: 0    Airway patency: patent  Anesthetic complications: no  Cardiovascular status: hemodynamically stable  Respiratory status: acceptable  Hydration status: euvolemic    PONV: none          There were no known complications for this encounter.     Nurse Pain Score: 0 (NPRS)

## 2021-03-08 NOTE — ANESTHESIA TIME REPORT
Anesthesia Start and Stop Event Times     Date Time Event    3/8/2021 0703 Ready for Procedure     0730 Anesthesia Start     0851 Anesthesia Stop        Responsible Staff  03/08/21    Name Role Begin End    Bhaskar Malin M.D. Anesth 0730 0851        Preop Diagnosis (Free Text):  Pre-op Diagnosis     MORBID OBESITY        Preop Diagnosis (Codes):    Post op Diagnosis  Morbid obesity (HCC)      Premium Reason  Non-Premium    Comments:

## 2021-03-08 NOTE — OR NURSING
Pre op admission completed.  Pt educated on surgical plan of care, all questions answered.  Bed in low position and call light within reach.  Hourly rounding in place.  This RN performed excellent hand hygiene and wore a surgical mask at all times.  Pt wore a mask at all times except when doing oral and nasal triple aim care. Educated pt on scopolamine patch removal and disposal.  Pt verbalized understanding.

## 2021-03-08 NOTE — PROGRESS NOTES
Med rec completed per Pt at bedside.  Allergies reviewed with Pt. No known drug allergies.  No oral antibiotics in last 14 days.

## 2021-03-08 NOTE — ANESTHESIA PREPROCEDURE EVALUATION
Relevant Problems   NEURO   (+) History of depression   (+) History of suicide attempt       Physical Exam    Airway   Mallampati: II  TM distance: >3 FB  Neck ROM: full       Cardiovascular - normal exam  Rhythm: regular  Rate: normal  (-) murmur     Dental - normal exam           Pulmonary - normal exam  Breath sounds clear to auscultation     Abdominal    Neurological - normal exam                 Anesthesia Plan    ASA 3   ASA physical status 3 criteria: morbid obesity - BMI greater than or equal to 40    Plan - general       Airway plan will be ETT          Induction: intravenous    Postoperative Plan: Postoperative administration of opioids is intended.    Pertinent diagnostic labs and testing reviewed    Informed Consent:    Anesthetic plan and risks discussed with patient.    Use of blood products discussed with: patient whom consented to blood products.

## 2021-03-08 NOTE — CARE PLAN
Problem: Communication  Goal: The ability to communicate needs accurately and effectively will improve  Outcome: PROGRESSING AS EXPECTED  Intervention: [place] patient and significant other/support system to call light to alert staff of needs  Note: Pt was educated on call light use and the need to alert staff to needs and prior to mobilization. Pt demonstrates understanding.       Problem: Pain Management  Goal: Pain level will decrease to patient's comfort goal  Outcome: PROGRESSING AS EXPECTED  Intervention: Follow pain managment plan developed in collaboration with patient and Interdisciplinary Team  Note: Pt was educated on 0-10 pain scale, non-pharm methods of pain relief and MAR. Pt states that their pain is well controlled and declines pain medication.

## 2021-03-08 NOTE — OR NURSING
0847: Pt arrived from OR, handoff received from anesthesiologist and RN. Lap sites x 5 with gauze and tegaderm C/D/I.     0920: Call made to patient's significant other to update patient status.     1135: Handoff attempted to T4. Awaiting call back.    1151:L Handoff to [facility] Sam on T4. O2 tank level above half full, pt transported on 10 L 02. Tubing connected to device and to patient and stored safely in ortiz prior to transport.

## 2021-03-08 NOTE — PROGRESS NOTES
Report received from PACU RN  Assumed care of patient at roughly 1300.    Pt is A&O x 4.  Pain reported as tolerable with rest and ice  Nausea denied   Tolerating sips of clears. Pt denies appetite at this time.   Surgical lap sites are dressed with guaze and tegaderm. No drainage noted.    - Urine output  - BM    - Flatus  SCD's in place and on   Family at bedside.  Bed in lowest position and locked.  Pt resting comfortably now.  Review plan of care with patient  Call light within reach  Hourly rounds in place  All needs met at this time

## 2021-03-09 VITALS
OXYGEN SATURATION: 97 % | TEMPERATURE: 98.5 F | BODY MASS INDEX: 40.84 KG/M2 | HEART RATE: 65 BPM | HEIGHT: 64 IN | SYSTOLIC BLOOD PRESSURE: 123 MMHG | WEIGHT: 239.2 LBS | RESPIRATION RATE: 16 BRPM | DIASTOLIC BLOOD PRESSURE: 80 MMHG

## 2021-03-09 LAB
ANION GAP SERPL CALC-SCNC: 7 MMOL/L (ref 7–16)
BUN SERPL-MCNC: 8 MG/DL (ref 8–22)
CALCIUM SERPL-MCNC: 8.6 MG/DL (ref 8.5–10.5)
CHLORIDE SERPL-SCNC: 104 MMOL/L (ref 96–112)
CO2 SERPL-SCNC: 23 MMOL/L (ref 20–33)
CREAT SERPL-MCNC: 0.44 MG/DL (ref 0.5–1.4)
ERYTHROCYTE [DISTWIDTH] IN BLOOD BY AUTOMATED COUNT: 47.5 FL (ref 35.9–50)
GLUCOSE SERPL-MCNC: 116 MG/DL (ref 65–99)
HCT VFR BLD AUTO: 37.8 % (ref 37–47)
HGB BLD-MCNC: 12.3 G/DL (ref 12–16)
MCH RBC QN AUTO: 29.9 PG (ref 27–33)
MCHC RBC AUTO-ENTMCNC: 32.5 G/DL (ref 33.6–35)
MCV RBC AUTO: 91.7 FL (ref 81.4–97.8)
PLATELET # BLD AUTO: 194 K/UL (ref 164–446)
PMV BLD AUTO: 10.9 FL (ref 9–12.9)
POTASSIUM SERPL-SCNC: 3.6 MMOL/L (ref 3.6–5.5)
RBC # BLD AUTO: 4.12 M/UL (ref 4.2–5.4)
SODIUM SERPL-SCNC: 134 MMOL/L (ref 135–145)
WBC # BLD AUTO: 13.4 K/UL (ref 4.8–10.8)

## 2021-03-09 PROCEDURE — 85027 COMPLETE CBC AUTOMATED: CPT

## 2021-03-09 PROCEDURE — 80048 BASIC METABOLIC PNL TOTAL CA: CPT

## 2021-03-09 PROCEDURE — 700111 HCHG RX REV CODE 636 W/ 250 OVERRIDE (IP): Performed by: PHYSICIAN ASSISTANT

## 2021-03-09 PROCEDURE — A9270 NON-COVERED ITEM OR SERVICE: HCPCS | Performed by: PHYSICIAN ASSISTANT

## 2021-03-09 PROCEDURE — 36415 COLL VENOUS BLD VENIPUNCTURE: CPT

## 2021-03-09 PROCEDURE — 96376 TX/PRO/DX INJ SAME DRUG ADON: CPT

## 2021-03-09 PROCEDURE — G0378 HOSPITAL OBSERVATION PER HR: HCPCS

## 2021-03-09 PROCEDURE — 700102 HCHG RX REV CODE 250 W/ 637 OVERRIDE(OP): Performed by: PHYSICIAN ASSISTANT

## 2021-03-09 RX ADMIN — FAMOTIDINE 20 MG: 10 INJECTION INTRAVENOUS at 05:03

## 2021-03-09 RX ADMIN — HYDROCODONE BITARTRATE AND ACETAMINOPHEN 10 MG: 7.5; 325 SOLUTION ORAL at 01:50

## 2021-03-09 ASSESSMENT — PAIN DESCRIPTION - PAIN TYPE: TYPE: ACUTE PAIN

## 2021-03-09 ASSESSMENT — ENCOUNTER SYMPTOMS
FEVER: 0
NAUSEA: 0
CHILLS: 0
SHORTNESS OF BREATH: 0
ABDOMINAL PAIN: 1
VOMITING: 0

## 2021-03-09 NOTE — DIETARY
NUTRITION SERVICES: BMI - Pt with BMI >40 (=Body mass index is 41.06 kg/m².), morbid obesity. Weight loss counseling not appropriate in acute care setting. Pt s/p laparoscopic vertical sleeve gastrectomy.  Anticipate f/u planned with MD/RD post d/c.     RECOMMEND - Referral to outpatient nutrition services for weight management, or F/u with MD/RD after D/C.

## 2021-03-09 NOTE — PROGRESS NOTES
Pt is A&O x4, calls appropriately  Pain 4/10   - nausea  Tolerating a gastric clears diet   x5 lap sites with CDI gauze and tagaderm  + voids  + flatus  Last BM pta  Up self  SCD's on  Bed alarm off, pt no fall risk per chadd lozoya  Reviewed plan of care with patient, bed in lowest position and locked, pt resting comfortably now, call light within reach, all needs met at this time. Interventions will be executed per plan of care

## 2021-03-09 NOTE — DISCHARGE INSTRUCTIONS
Discharge Instructions    Discharged to home by car with relative. Discharged via walking, hospital escort: Yes.  Special equipment needed: Not Applicable    Be sure to schedule a follow-up appointment with your primary care doctor or any specialists as instructed.     Discharge Plan:   Diet Plan: Discussed  Activity Level: Discussed  Confirmed Follow up Appointment: Appointment Scheduled  Confirmed Symptoms Management: Discussed  Medication Reconciliation Updated: Yes  Influenza Vaccine Indication: Not indicated: Previously immunized this influenza season and > 8 years of age    I understand that a diet low in cholesterol, fat, and sodium is recommended for good health. Unless I have been given specific instructions below for another diet, I accept this instruction as my diet prescription.   Other diet: See post op packet    Special Instructions: None    · Is patient discharged on Warfarin / Coumadin?   No   Eating Plan After Bariatric Surgery, Stage 1  A diet after weight loss surgery (bariatric surgery) should provide plenty of fluids and nutrients while promoting weight loss and healing. Each stage of recovery has a different set of food and drink recommendations. Your health care provider may recommend that you work with a diet and nutrition specialist (dietitian) to make a staged eating plan that is right for you.  Stage 1 begins right after surgery and lasts until about 2 weeks after surgery, or as long as directed by your health care provider. During this time, you will eat a liquid-only diet. You will be on clear liquids immediately after surgery. After your health care provider approves, you will move on to full liquids.  What are tips for following this plan?    Meal planning  · Eat at set times. Allow 30-45 minutes for each meal.  · Have protein with every meal. Eat protein foods first. Protein is a very important nutrient after surgery.  · You will need at least 60-80 g of protein daily, or as much as  "determined by your dietitian. Work with your dietitian to choose a liquid protein supplement that has:  ? At least 15 g of protein per 8 oz serving.  ? Less than 20 g total carbohydrate per 8 oz serving.  ? Less than 5 g fat per 8 oz serving.  · To get more protein, you may add 1 Tbsp non-fat dry milk powder to each ¼ cup of skim milk.  · Do not eat or drink:  ? Carbonated beverages.  ? Sweets with more than 25 g of sugar per serving.  ? High-fat foods. This includes foods with more than 5 g of fat per serving.  ? Alcohol.  ? Any foods you do not tolerate well, such as dairy or high-fiber foods.  · Move on to a soft-food diet when your health care provider approves. For most people, this happens after 2 weeks on a liquid-only diet.  General instructions  · Sip liquids. Do not use a straw until several weeks after surgery.  · Do not drink extra liquids with meals for 30 minutes before or after meals.  · Slowly sip 8-10 oz of clear liquid, preferably water, between each meal. Try to get at least 48-60 oz of fluid each day.  · Take a liquid or chewable multivitamin with iron each day. Discuss additional supplements with your health care provider or dietitian.  · Stop eating when you feel full.  · Your surgeon or dietitian may have specific eating guidelines for you. This may depend on:  ? The type of weight loss surgery you had.  ? Your overall health.  Clear liquids  It is important to drink clear liquids to make sure you stay hydrated. Try to drink at least 24-30 oz (about 3 cups) of clear liquids each day. Clear liquids have:  · No carbonation.  · No sugar or calories.  · No caffeine or alcohol.  Clear liquids include:  · Water and flavored water.  · Decaffeinated coffee or tea.  · \"Light\" powdered juice mixes, like lemonade.  · Clear broth.  · \"Light\" flavored gelatin.  · Sugar-free popsicles.  Full liquids  Full liquids are important for making sure you have enough calories, protein, and other nutrients. Try to " "get at least 24-30 oz (about 3 cups) of full liquids each day. Full liquids include:  · Low-fat or fat-free milk.  · Soy milk.  · Yogurt.  · Protein shakes or protein powder.  · \"Light\" meal replacement shakes.  · Sugar-free pudding.  Summary  · Stage 1 begins right after surgery and lasts until about 2 weeks after surgery, or as directed by your health care provider.  · Stage 1 diet is a liquid-only diet. You will be on clear liquids immediately after surgery. After your health care provider approves, you will move to full liquids.  · Eating enough protein and drinking plenty of fluids are important in promoting weight loss and healing after surgery.  This information is not intended to replace advice given to you by your health care provider. Make sure you discuss any questions you have with your health care provider.  Document Released: 06/23/2004 Document Revised: 04/09/2020 Document Reviewed: 03/19/2018  [employer] Patient Education © 2020 [employer]      Eating Plan After Bariatric Surgery, Stage 2  A diet after weight loss surgery (bariatric surgery) should provide plenty of fluids and nutrients while promoting weight loss and healing. Each stage of recovery has a different set of food and drink recommendations. Your health care provider may recommend that you work with a diet and nutrition specialist (dietitian) to make a staged eating plan that is right for you.  Stage 2 starts about 2 weeks after surgery and lasts until about 4-5 weeks after surgery. During this stage, you begin to eat solid foods. For the first week of your Stage 2 diet, you will eat soft, protein-rich foods. After the first one week of this stage, you will gradually add other soft foods, such as fruits and vegetables.  What are tips for following this plan?  Cooking  · Prepare foods with gravy, broth, or mayonnaise to moisten and soften the texture.  · Cook foods until soft and tender. Use cooking methods such as boiling or " braising.  · Puree foods, as needed.  Meal planning  · Eat 3 meals and 2 snacks, or 5 small meals each day.   · Eat at set times. Allow 30-45 minutes for each meal.  · Limit food intake to ¾-1 cup total per meal.  · Include a soft protein food at every meal and snack, and eat the protein food first. Eat 60-80 g of protein a day when possible.  · Do not eat or drink:  ? Carbonated beverages.  ? Sweets with more than 25 g of sugar per serving.  ? High-fat foods. This includes foods with more than 5 g of fat per serving.  ? Alcohol.  ? Any foods you do not tolerate well, such as dairy or high-fiber foods.  General instructions  · Eat soft foods. This includes foods that are:  ? Fork-mashed or pureed.  ? Finely minced or ground.  ? Moist.  ? Easy to chew and swallow.  · After 1 week of soft protein foods, you may start to introduce other soft foods. Introduce soft foods as told by your dietitian and as you feel comfortable.  · Avoid starchy foods, such as bread, crackers, pasta, or cereal.  · Do not eat more than you can tolerate. Stop eating when you feel full.  · Continue to drink plenty of fluids. Sip clear liquids between meals. Full liquids may be eaten in place of a meal or snack. Try to get at least 48-60 oz of fluid each day.  · Do not drink extra liquids with meals or 30 minutes before or after meals.  · Meet with a dietitian during this stage to plan for adding other solid foods back into your diet.  · Take vitamin and mineral supplements as directed by your health care provider.  Soft protein foods  Soft protein foods include:  · Well-cooked beans and lentils. Hummus.  · Tofu, tempeh, and bean or veggie burgers.  · Low-fat or nonfat cheese, cottage cheese, yogurt, and milk.  · Eggs or egg substitutes. Fish.  · Tender, soft-cooked lean meat and poultry. Ground meats.  Other soft foods  Other soft foods that you may eat during stage 2 include:  · Soft fruit, including soft canned fruit in light syrup or natural  juice, apple sauce, bananas, melons, peaches, pears, and strawberries.  · Well-cooked vegetables.   · Hot wheat cereal. Unflavored oatmeal.  · Baby food or toddler fruits and vegetables.  · Blended fruit smoothies.  Summary  · Stage 2 diet after bariatric surgery lasts from about 2 weeks until 4-5 weeks after surgery, or as long as directed by your health care provider.  · Stage 2 diet involves eating soft solid foods. For the first week of this diet, you will eat soft, protein-rich foods. After that, you will gradually eat other soft foods as well, such as fruits and vegetables.  · Eating enough protein and drinking plenty of fluids are important for promoting weight loss and healing after surgery.  This information is not intended to replace advice given to you by your health care provider. Make sure you discuss any questions you have with your health care provider.  Document Released: 04/23/2018 Document Revised: 04/09/2020 Document Reviewed: 04/23/2018  [employer] Patient Education © 2020 [employer]      Eating Plan After Bariatric Surgery, Stage 3  A diet after weight loss surgery (bariatric surgery) should provide plenty of fluids and nutrients while promoting weight loss and healing. Each stage of recovery has a different set of food and drink recommendations. Your health care provider may recommend that you work with a diet and nutrition specialist (dietitian) to make a staged eating plan that is right for you.  You may start to transition to a stage 3 diet about 5-6 weeks after surgery. During this stage, you will be allowed to eat foods of various textures. Continue to follow the guidelines below until your health care provider or dietitian approves eating a regular weight-maintenance diet.  What are tips for following this plan?  Cooking  · Use low-fat cooking methods, such as baking, broiling, boiling, or grilling.  · Cook using healthy fats, such as olive, sunflower, grapeseed, or canola oil.  · Cook  and bake using no-calorie sweeteners.  · Avoid adding extra salt, sugar, or fat to foods when cooking.  Meal planning  · Eat 3 meals and 2 snacks, or 5 small meals each day.  · Eat at set times. Allow 30-45 minutes for each meal.  · Do not skip meals or go a long time without eating (fast). If you are having a hard time eating, talk to your health care provider or dietitian.  · Limit food intake to ¾-1 cup per meal. As you heal and advance, you may be able to eat a little more with each meal. Always listen to your body.  · Eat foods from all food groups. This includes fruits and vegetables, grains, dairy, and meat and other proteins.  · Limit carbohydrate intake to no more than 30 g per meal or 130 g per day. There are about 15-20 g of carbohydrates in 1 piece of bread or a medium piece of fruit. Half of your total grains should be whole grains.  · Include a protein-rich food at every meal and snack, and eat the protein food first. Eat 60-80 g of protein a day when possible.  General instructions  · Work with your dietitian to slowly add new foods to your diet.  · Stay hydrated. Drink at least 48-64 oz of noncarbonated, zero-calorie fluid per day. Water is the best choice.  · Limit alcohol intake as directed by your health care provider.  · Avoid foods that are high in fat or have added sugar.  · Take any vitamin supplements as directed by your health care provider.  · Ask your dietitian to help you with meal planning and strategies to continue to lose weight and maintain weight loss.  Recommended foods    Grains  · Whole wheat bread, crackers, and pasta.  · Rice.  · Unsweetened hot and cold cereals.  Vegetables  · All fresh, frozen, or canned vegetables.  Fruits  · All fresh, frozen, or canned fruit.  Meats and other protein foods  · Lean meat, poultry, and fish.  · Eggs and egg substitutes.  · Beans and lentils. Smooth nut butters.  · Soy products, such as tempeh and tofu.  Dairy  · Low or nonfat milk, cheese, and  yogurt.  · Sugar-free pudding.  · Cottage cheese.  Beverages  · Decaffeinated coffee and tea.  · Sugar-free, caffeine-free soft drinks.  Fats and oils  · Avocado.  · Olive, sunflower, grapeseed, or canola oil.  Sweets and desserts  · Low-fat, sugar-free desserts.  Seasoning and other foods  · Low-fat, low-sodium condiments.  The items listed above may not be a complete list of foods and beverages [you/your child] can eat. Contact a dietitian for more information.  Foods to avoid  · Hard-to-digest foods, including:  ? Popcorn.  ? Nuts and seeds.  ? Celery.  ? White parts of citrus fruits (pith).  · Caffeinated drinks, including:  ? Coffee and tea.  ? Energy drinks.  · Sweets and desserts with more than 25 g of sugar per serving.  The items listed above may not be a complete list of foods and beverages [you/your child] should avoid. Contact a dietitian for more information.  Summary  · Stage 3 diet starts about 5-6 weeks after surgery. Continue to follow stage 3 guidelines until your health care provider or dietitian approves eating a regular weight-maintenance diet.  · Stage 3 diet involves eating a balanced, healthy diet with foods of various textures. This diet helps you continue to lose weight and maintain weight loss.  · Eating enough protein and drinking plenty of fluids is important for promoting weight loss and healing after surgery.  This information is not intended to replace advice given to you by your health care provider. Make sure you discuss any questions you have with your health care provider.  Document Released: 04/23/2018 Document Revised: 04/09/2020 Document Reviewed: 04/23/2018  [email or web address] Patient Education © 2020 [employer]    PLAN  Bariatric clears today, advance diet as tolerated to Bariatric full liquids on morning of POD#2. Strive for 5-10 ouces of fluid per hour while awake. Follow guidelines in preop booklet for food choices.  Incentive spirometry Q hr while awake, continue at home.  Up ad  tejal.  Ok to Shower over Tegaderms; remove Tegaderms on 03/12/21 .  No baths or soaks x 14 days.  No driving x 4-5 days.  No lifting > 15 lbs until after post-op appt.  D/C home when alert, comfortable, ambulatory, and tolerating PO well.  Pt Counseled re: I.S., diet, activity, home med's, continued use of incentive spirometer at home,  and wound care.  Begin PPI at home (all home med's larger in size than eraser head should be crushed or changed to liquid form x 1-2 weeks, while on liquid diet).  Hold MVI/supplements until after postop appointment.  F/U with Dr. Ganser ~ 1-2 weeks.          Depression / Suicide Risk    As you are discharged from this [facility] facility, it is important to learn how to keep safe from harming yourself.    Recognize the warning signs:  · Abrupt changes in personality, positive or negative- including increase in energy   · Giving away possessions  · Change in eating patterns- significant weight changes-  positive or negative  · Change in sleeping patterns- unable to sleep or sleeping all the time   · Unwillingness or inability to communicate  · Depression  · Unusual sadness, discouragement and loneliness  · Talk of wanting to die  · Neglect of personal appearance   · Rebelliousness- reckless behavior  · Withdrawal from people/activities they love  · Confusion- inability to concentrate     If you or a loved one observes any of these behaviors or has concerns about self-harm, here's what you can do:  · Talk about it- your feelings and reasons for harming yourself  · Remove any means that you might use to hurt yourself (examples: pills, rope, extension cords, firearm)  · Get professional help from the community (Mental Health, Substance Abuse, psychological counseling)  · Do not be alone:Call your Safe Contact- someone whom you trust who will be there for you.  · Call your local CRISIS HOTLINE 129-3280 or 475-398-3846  · Call your local Children's Mobile Crisis Response Team [place] (597) 239-1973 or www.[employer]  · Call the toll free National Suicide Prevention Hotlines   · National Suicide Prevention Lifeline 102-356-VMPT (4482)  · National Instant Opinion Line Network 800-SUICIDE (668-0731)

## 2021-03-09 NOTE — CARE PLAN
Problem: Communication  Goal: The ability to communicate needs accurately and effectively will improve  Outcome: PROGRESSING AS EXPECTED  Pt calls appropriately     Problem: Safety  Goal: Will remain free from injury  Outcome: PROGRESSING AS EXPECTED  Goal: Will remain free from falls  Outcome: PROGRESSING AS EXPECTED  Pt free from injury and falls     Problem: Pain Management  Goal: Pain level will decrease to patient's comfort goal  Outcome: PROGRESSING AS EXPECTED  Pt medicated per MAR     Problem: Respiratory:  Goal: Respiratory status will improve  Outcome: PROGRESSING AS EXPECTED  Pt stating >90% RA     Problem: Mobility  Goal: Risk for activity intolerance will decrease  Outcome: PROGRESSING AS EXPECTED  Pt up self

## 2021-03-09 NOTE — PROGRESS NOTES
Discharge instructions reviewed with patient. No further questions at this time. Incentive spirometer given and education on usage provided. Patient ambulated out of unit with this RN.

## 2021-03-09 NOTE — PROGRESS NOTES
Surgical Progress Note    Author: LISA Brantley Date & Time created: 3/9/2021   8:05 AM     Interval Events:  S/p Laparoscopic Vertical Sleeve Gastrectomy -POD#1, doing well; christiane bariatric clears  well without dysphagia or N/V; ambulatory; No IS in room; pain controlled; wants to go home      Review of Systems   Constitutional: Negative for chills and fever.   Respiratory: Negative for shortness of breath.    Gastrointestinal: Positive for abdominal pain. Negative for nausea and vomiting.   Skin: Negative for itching and rash.     Hemodynamics:  Temp (24hrs), Av.8 °C (98.3 °F), Min:36.3 °C (97.3 °F), Max:37.2 °C (99 °F)  Temperature: 36.8 °C (98.3 °F)  Pulse  Av.3  Min: 62  Max: 90   Blood Pressure: 125/90     Respiratory:    Respiration: 18, Pulse Oximetry: 93 %        RUL Breath Sounds: Clear, RML Breath Sounds: Clear, RLL Breath Sounds: Diminished, GAYATRI Breath Sounds: Clear, LLL Breath Sounds: Diminished  Neuro:  GCS       Fluids:    Intake/Output Summary (Last 24 hours) at 3/9/2021 0805  Last data filed at 3/9/2021 0400  Gross per 24 hour   Intake 5400 ml   Output 50 ml   Net 5350 ml        Current Diet Order   Procedures   • Diet Order Diet: Clear Liquid; Miscellaneous modifications: (optional): Bariatric     Physical Exam  Vitals and nursing note reviewed.   Constitutional:       General: She is not in acute distress.     Appearance: She is obese.   Cardiovascular:      Rate and Rhythm: Normal rate.   Pulmonary:      Effort: No respiratory distress.   Abdominal:      Palpations: Abdomen is soft.      Comments: Teg's c/d/i   Skin:     General: Skin is warm and dry.   Neurological:      Mental Status: She is alert and oriented to person, place, and time.   Psychiatric:         Mood and Affect: Mood normal.       Labs:  Recent Results (from the past 24 hour(s))   Histology Request    Collection Time: 21  9:31 AM   Result Value Ref Range    Pathology Request Sent to Histo    [facility] without  Differential (blood)    Collection Time: 03/09/21  3:53 AM   Result Value Ref Range    WBC 13.4 (H) 4.8 - 10.8 K/uL    RBC 4.12 (L) 4.20 - 5.40 M/uL    Hemoglobin 12.3 12.0 - 16.0 g/dL    Hematocrit 37.8 37.0 - 47.0 %    MCV 91.7 81.4 - 97.8 fL    MCH 29.9 27.0 - 33.0 pg    MCHC 32.5 (L) 33.6 - 35.0 g/dL    RDW 47.5 35.9 - 50.0 fL    Platelet Count 194 164 - 446 K/uL    MPV 10.9 9.0 - 12.9 fL   Basic Metabolic Panel (BMP)    Collection Time: 03/09/21  3:53 AM   Result Value Ref Range    Sodium 134 (L) 135 - 145 mmol/L    Potassium 3.6 3.6 - 5.5 mmol/L    Chloride 104 96 - 112 mmol/L    Co2 23 20 - 33 mmol/L    Glucose 116 (H) 65 - 99 mg/dL    Bun 8 8 - 22 mg/dL    Creatinine 0.44 (L) 0.50 - 1.40 mg/dL    Calcium 8.6 8.5 - 10.5 mg/dL    Anion Gap 7.0 7.0 - 16.0   ESTIMATED GFR    Collection Time: 03/09/21  3:53 AM   Result Value Ref Range    GFR If African American >60 >60 mL/min/1.73 m 2    GFR If Non African American >60 >60 mL/min/1.73 m 2     Medical Decision Making, by Problem:  There are no active hospital problems to display for this patient.    Plan:  Bariatric clears today, advance diet as tolerated to Bariatric full liquids on morning of POD#2. Strive for 5-10 ouces of fluid per hour while awake. Follow guidelines in preop booklet for food choices.  Incentive spirometry Q hr while awake, continue at home.  Up ad tejal.  Ok to Shower over Tegaderms; remove Tegaderms on 03/12/21 .  No baths or soaks x 14 days.  No driving x 4-5 days.  No lifting > 15 lbs until after post-op appt.  D/C home when alert, comfortable, ambulatory, and tolerating PO well.  Pt Counseled re: I.S., diet, activity, home med's, continued use of incentive spirometer at home,  and wound care.  Begin PPI at home (all home med's larger in size than eraser head should be crushed or changed to liquid form x 1-2 weeks, while on liquid diet).  Hold MVI/supplements until after postop appointment.  F/U with Dr. Ganser ~ 1-2 weeks.    Quality  Measures:  Quality-Core Measures   Reviewed items::  Labs reviewed and Medications reviewed  Mcmahan catheter::  No Mcmahan  DVT prophylaxis pharmacological::  Enoxaparin (Lovenox)  DVT prophylaxis - mechanical:  SCDs  Ulcer Prophylaxis::  Yes      Discussed patient condition with RN, Patient and Dr. Ganser

## 2021-07-27 ENCOUNTER — HOSPITAL ENCOUNTER (OUTPATIENT)
Dept: LAB | Facility: MEDICAL CENTER | Age: 41
End: 2021-07-27
Attending: INTERNAL MEDICINE
Payer: COMMERCIAL

## 2021-07-27 PROCEDURE — 36415 COLL VENOUS BLD VENIPUNCTURE: CPT

## 2021-07-27 PROCEDURE — 87522 HEPATITIS C REVRS TRNSCRPJ: CPT

## 2021-07-30 LAB
HCV RNA SERPL NAA+PROBE-ACNC: NOT DETECTED IU/ML
HCV RNA SERPL NAA+PROBE-LOG IU: NOT DETECTED LOG IU/ML
HCV RNA SERPL QL NAA+PROBE: NOT DETECTED

## 2021-12-01 ENCOUNTER — OFFICE VISIT (OUTPATIENT)
Dept: URGENT CARE | Facility: PHYSICIAN GROUP | Age: 41
End: 2021-12-01
Payer: COMMERCIAL

## 2021-12-01 VITALS
BODY MASS INDEX: 32.54 KG/M2 | SYSTOLIC BLOOD PRESSURE: 108 MMHG | DIASTOLIC BLOOD PRESSURE: 70 MMHG | WEIGHT: 190.6 LBS | HEIGHT: 64 IN | HEART RATE: 73 BPM | OXYGEN SATURATION: 99 % | TEMPERATURE: 97.6 F | RESPIRATION RATE: 16 BRPM

## 2021-12-01 DIAGNOSIS — J06.9 UPPER RESPIRATORY TRACT INFECTION, UNSPECIFIED TYPE: ICD-10-CM

## 2021-12-01 DIAGNOSIS — J02.9 SORE THROAT: ICD-10-CM

## 2021-12-01 DIAGNOSIS — R10.31 RLQ ABDOMINAL PAIN: ICD-10-CM

## 2021-12-01 LAB
EXTERNAL QUALITY CONTROL: NORMAL
INT CON NEG: NORMAL
INT CON POS: NORMAL
S PYO AG THROAT QL: NEGATIVE
SARS-COV+SARS-COV-2 AG RESP QL IA.RAPID: NEGATIVE

## 2021-12-01 PROCEDURE — 87426 SARSCOV CORONAVIRUS AG IA: CPT | Performed by: PHYSICIAN ASSISTANT

## 2021-12-01 PROCEDURE — 87880 STREP A ASSAY W/OPTIC: CPT | Performed by: PHYSICIAN ASSISTANT

## 2021-12-01 PROCEDURE — 99213 OFFICE O/P EST LOW 20 MIN: CPT | Performed by: PHYSICIAN ASSISTANT

## 2021-12-01 RX ORDER — OMEPRAZOLE 20 MG/1
20 CAPSULE, DELAYED RELEASE ORAL DAILY
COMMUNITY
End: 2023-06-19

## 2021-12-01 ASSESSMENT — FIBROSIS 4 INDEX: FIB4 SCORE: 1.66

## 2021-12-01 NOTE — LETTER
December 1, 2021         Patient: Terri Alvares   YOB: 1980   Date of Visit: 12/1/2021           To Whom it May Concern:    Terri Alvares was seen in my clinic on 12/1/2021. She may return to work 12/2/21.    If you have any questions or concerns, please don't hesitate to call.        Sincerely,           Tahmina Isbell P.A.-C.  Electronically Signed

## 2021-12-01 NOTE — PROGRESS NOTES
"Chief Complaint   Patient presents with   • Pharyngitis   • Otalgia   • Sinus Problem     sinus pain        HISTORY OF PRESENT ILLNESS: Patient is a 41 y.o. female who presents today for the following:    Symptoms started 2 days ago  + ST, right ear pain, right sinus pressure  No cough, fever, chills, N/V  This morning right sided LBP into the RLQ pain; + frequency but otherwise no urinary symptoms  Achy RLQ pain that has since resolved  Currently no urinary symptoms or abdominal pain  Negative COVID test 2 days ago    Patient Active Problem List    Diagnosis Date Noted   • Encounter for postpartum care after hospital delivery 2018   • Mastitis 2018   • HSV infection 2018   • Advanced maternal age in multigravida 2013   • History of laparoscopic adjustable gastric banding 2013   • History of depression 2013   • History of suicide attempt 2013       Allergies:Patient has no known allergies.    Current Outpatient Medications Ordered in Epic   Medication Sig Dispense Refill   • omeprazole (PRILOSEC) 20 MG delayed-release capsule Take 20 mg by mouth every day.       No current [facility]-ordered facility-administered medications on file.       Past Medical History:   Diagnosis Date   • GDM (gestational diabetes mellitus) 7/3/2013    denies,States she didn't take the test, not sure why this was entered:  states treated r/t previous gastric surgery. NOT current diabetic   • Hepatitis C 2019   • Migraine    • Psychiatric problem     depression- \"when I was younger, no issues now\"       Social History     Tobacco Use   • Smoking status: Former Smoker     Packs/day: 0.20     Years: 10.00     Pack years: 2.00     Types: Cigarettes     Quit date: 2013     Years since quittin.0   • Smokeless tobacco: Never Used   • Tobacco comment: last used 3/13 due to pregnancy   Vaping Use   • Vaping Use: Never used   Substance Use Topics   • Alcohol use: Yes     Comment: 2 per year   • Drug use: " "No       Family Status   Relation Name Status   • Mo  Alive   • Fa  Alive   • Bro  Alive   • MAunt 2 Alive   • MGMo  Alive   • MGFa     • PGMo     • PGFa     • Bro  Alive     Family History   Problem Relation Age of Onset   • Diabetes Mother         dialysis   • Diabetes Brother    • Diabetes Maternal Aunt    • Diabetes Maternal Grandmother    • No Known Problems Brother        Review of Systems:   Pertinent systems reviewed with the patient.    Exam:  /70   Pulse 73   Temp 36.4 °C (97.6 °F)   Resp 16   Ht 1.626 m (5' 4\")   Wt 86.5 kg (190 lb 9.6 oz)   SpO2 99%   General: Well developed, well nourished. No distress.    Eye: PERRL/EOMI; conjunctivae clear, lids normal.  ENMT: Lips without lesions, MMM. Oropharynx is clear. Bilateral TMs are within normal limits.  Pulmonary: Unlabored respiratory effort. Lungs clear to auscultation, no wheezes, no rhonchi.    Cardiovascular: Regular rate and rhythm without murmur.   Abdomen: soft, nondistended, nontender to palpation.  Neurologic: Grossly nonfocal. No facial asymmetry noted.  Lymph: No cervical lymphadenopathy noted.  Skin: Warm, dry, good turgor. No rashes in visible areas.   Psych: Normal mood. Alert and oriented to person, place and time.    Rapid strep: Negative  Rapid Covid: negative    Assessment/Plan:  Discussed likely viral etiology.  Vitals and exam are unremarkable.  Low suspicion for pneumonia.  Discussed appropriate over-the-counter symptomatic medication, and when to return to clinic. Follow up for worsening or persistent symptoms.  1. Upper respiratory tract infection, unspecified type  POCT SARS-COV Antigen MONTSE (Symptomatic Only)   2. Sore throat  POCT Rapid Strep A   3. RLQ abdominal pain  POCT Urinalysis    POCT PREGNANCY       "

## 2021-12-28 NOTE — OR NURSING
Preop test results faxed to Dr. Ganser 3/1/2021 @ 1439.  EKG still pending.   Xelmickz Pregnancy And Lactation Text: This medication is Pregnancy Category D and is not considered safe during pregnancy.  The risk during breast feeding is also uncertain.

## 2022-02-10 ENCOUNTER — HOSPITAL ENCOUNTER (OUTPATIENT)
Dept: LAB | Facility: MEDICAL CENTER | Age: 42
End: 2022-02-10
Attending: INTERNAL MEDICINE
Payer: COMMERCIAL

## 2022-02-10 LAB
ALBUMIN SERPL BCP-MCNC: 4.4 G/DL (ref 3.2–4.9)
ALBUMIN/GLOB SERPL: 1.5 G/DL
ALP SERPL-CCNC: 113 U/L (ref 30–99)
ALT SERPL-CCNC: 8 U/L (ref 2–50)
ANION GAP SERPL CALC-SCNC: 12 MMOL/L (ref 7–16)
AST SERPL-CCNC: 18 U/L (ref 12–45)
BILIRUB SERPL-MCNC: 0.2 MG/DL (ref 0.1–1.5)
BUN SERPL-MCNC: 12 MG/DL (ref 8–22)
CALCIUM SERPL-MCNC: 9.4 MG/DL (ref 8.5–10.5)
CHLORIDE SERPL-SCNC: 105 MMOL/L (ref 96–112)
CO2 SERPL-SCNC: 22 MMOL/L (ref 20–33)
CREAT SERPL-MCNC: 0.49 MG/DL (ref 0.5–1.4)
GLOBULIN SER CALC-MCNC: 2.9 G/DL (ref 1.9–3.5)
GLUCOSE SERPL-MCNC: 88 MG/DL (ref 65–99)
POTASSIUM SERPL-SCNC: 4.2 MMOL/L (ref 3.6–5.5)
PROT SERPL-MCNC: 7.3 G/DL (ref 6–8.2)
SODIUM SERPL-SCNC: 139 MMOL/L (ref 135–145)

## 2022-02-10 PROCEDURE — 87522 HEPATITIS C REVRS TRNSCRPJ: CPT

## 2022-02-10 PROCEDURE — 36415 COLL VENOUS BLD VENIPUNCTURE: CPT

## 2022-02-10 PROCEDURE — 80053 COMPREHEN METABOLIC PANEL: CPT

## 2022-02-22 ENCOUNTER — APPOINTMENT (OUTPATIENT)
Dept: RADIOLOGY | Facility: MEDICAL CENTER | Age: 42
End: 2022-02-22
Attending: INTERNAL MEDICINE
Payer: COMMERCIAL

## 2022-02-22 DIAGNOSIS — B19.20 HEPATITIS C VIRUS INFECTION WITHOUT HEPATIC COMA, UNSPECIFIED CHRONICITY: ICD-10-CM

## 2022-02-22 DIAGNOSIS — R10.9 ABDOMINAL PAIN, UNSPECIFIED ABDOMINAL LOCATION: ICD-10-CM

## 2022-02-22 DIAGNOSIS — K76.0 FATTY LIVER: ICD-10-CM

## 2022-02-22 PROCEDURE — 700117 HCHG RX CONTRAST REV CODE 255: Performed by: INTERNAL MEDICINE

## 2022-02-22 PROCEDURE — 74160 CT ABDOMEN W/CONTRAST: CPT

## 2022-02-22 RX ADMIN — IOHEXOL 100 ML: 350 INJECTION, SOLUTION INTRAVENOUS at 09:23

## 2022-02-28 ENCOUNTER — HOSPITAL ENCOUNTER (OUTPATIENT)
Dept: RADIOLOGY | Facility: MEDICAL CENTER | Age: 42
End: 2022-02-28
Attending: SPECIALIST
Payer: COMMERCIAL

## 2022-02-28 DIAGNOSIS — R10.2 PELVIC PAIN: ICD-10-CM

## 2022-02-28 DIAGNOSIS — N92.1 MENORRHAGIA WITH IRREGULAR CYCLE: ICD-10-CM

## 2022-02-28 PROCEDURE — 76830 TRANSVAGINAL US NON-OB: CPT

## 2022-11-26 ENCOUNTER — HOSPITAL ENCOUNTER (OUTPATIENT)
Facility: MEDICAL CENTER | Age: 42
End: 2022-11-26
Attending: FAMILY MEDICINE
Payer: COMMERCIAL

## 2022-11-26 ENCOUNTER — OFFICE VISIT (OUTPATIENT)
Dept: URGENT CARE | Facility: PHYSICIAN GROUP | Age: 42
End: 2022-11-26
Payer: COMMERCIAL

## 2022-11-26 VITALS
RESPIRATION RATE: 16 BRPM | DIASTOLIC BLOOD PRESSURE: 72 MMHG | HEART RATE: 82 BPM | TEMPERATURE: 97.9 F | HEIGHT: 65 IN | WEIGHT: 187.4 LBS | SYSTOLIC BLOOD PRESSURE: 112 MMHG | BODY MASS INDEX: 31.22 KG/M2 | OXYGEN SATURATION: 97 %

## 2022-11-26 DIAGNOSIS — Z20.822 SUSPECTED COVID-19 VIRUS INFECTION: ICD-10-CM

## 2022-11-26 DIAGNOSIS — J06.9 VIRAL URI WITH COUGH: ICD-10-CM

## 2022-11-26 PROCEDURE — 99213 OFFICE O/P EST LOW 20 MIN: CPT | Mod: CS | Performed by: FAMILY MEDICINE

## 2022-11-26 PROCEDURE — 0240U HCHG SARS-COV-2 COVID-19 NFCT DS RESP RNA 3 TRGT MIC: CPT

## 2022-11-26 RX ORDER — LORATADINE 10 MG/1
TABLET ORAL
COMMUNITY
Start: 2022-09-06

## 2022-11-26 RX ORDER — SUMATRIPTAN 100 MG/1
TABLET, FILM COATED ORAL
COMMUNITY
Start: 2022-10-19 | End: 2023-06-19

## 2022-11-26 ASSESSMENT — ENCOUNTER SYMPTOMS
COUGH: 1
NAUSEA: 0
SORE THROAT: 0
SHORTNESS OF BREATH: 0
CHILLS: 0
FEVER: 1
DIZZINESS: 0
FATIGUE: 1
VOMITING: 0
HEADACHES: 1
MYALGIAS: 0

## 2022-11-26 ASSESSMENT — FIBROSIS 4 INDEX: FIB4 SCORE: 1.44

## 2022-11-26 NOTE — PATIENT INSTRUCTIONS
INSTRUCTIONS FOR COVID-19 OR ANY OTHER INFECTIOUS RESPIRATORY ILLNESSES    Symptoms of viral acute respiratory illnesses (e.g COVID-19, Influenza, RSV etc.) may include: cough, shortness of breath or difficulty breathing, fever or chills, muscle or body aches, headache, sore throat, congestion or runny nose, nausea or vomiting, diarrhea, or new loss of taste or smell. Symptoms can range from mild to severe and may appear up to two weeks after exposure to infectious virus.    The practice of self-isolation and quarantine helps protect the public and your family by  preventing exposure to people who have or may have a contagious disease. Please follow the prevention steps below:    WHEN TO STOP ISOLATION: Persons with COVID-19 or any other infectious respiratory illness who have symptoms and were advised to care for themselves at home may discontinue home isolation under the following conditions:  At least 24 hours have passed since recovery defined as resolution of fever without the use of fever-reducing medications; AND,  Improvement in respiratory symptoms (e.g., cough, shortness of breath); AND,  At least 5 days have passed since symptoms first appeared. Immunocompromised* individual need to isolate for 10 days.  Wear a well-fitting mask for an additional 5 days anytime you are away from your home or around other people. If you are unable to wear one, maintain a minimum of 6 feet distancing from others.    *Definition of immunocompromised   1. Active treatment for solid tumor or hematologic malignancy  2. Receipt of solid organ transplant and taking immunosuppressant  3. Moderate or severe primary immunodeficiency  4. Advanced or untreated HIV infection  5. Active treatment with high-dose corticosteroids, chemotherapy, TNF blockers or other biological agent    MONITOR YOUR SYMPTOMS: If your illness is worsening, seek prompt medical attention.   ACTIVITY RESTRICTION: restrict activities outside your home, except  for getting medical care. Do not go to work, school, or public areas. Avoid using public transportation, ride-sharing, or taxis.    LIVING ENVIRONMENT: Stay in a separate room from other people and pets. If possible, use a separate bathroom and avoid sharing household items. Any items used should be washed thoroughly with soap and water. Clean all “high-touch” surfaces every day. Use a household cleaning spray or wipe, according to the label instructions. High touch surfaces include (but are not limited to) counters, tabletops, doorknobs, bathroom fixtures, toilets, phones, keyboards, tablets, and bedside tables.     HAND WASHING: Frequently wash hands with soap and water for at least 20 seconds, especially after blowing your nose, coughing, or sneezing; going to the bathroom; before and after interacting with pets; and before and after eating or preparing food. If hands are visibly dirty use soap and water. If soap and water are not available, use an alcohol-based hand  with at least 60% alcohol. Avoid touching your eyes, nose, and mouth with unwashed hands. Cover your coughs and sneezes with a tissue. Throw used tissues in a lined trash can. Immediately wash your hands.    ACTIVE/FACILITATED SELF-MONITORING: Follow instructions provided by your local health department or health professionals, as appropriate. When working with your local health department check their available hours.    [facility]   Phone Number   [place] [phone]   [facility] [phone]   [facility]   [place] [phone]     Cough, Adult  A cough helps to clear your throat and lungs. A cough may be a sign of an illness or another medical condition.  An acute cough may only last 2-3 weeks, while a chronic cough may last 8 or more weeks.  Many things can cause a cough. They include:  Germs (viruses or bacteria) that attack the airway.  Breathing in things that bother (irritate) your  lungs.  Allergies.  Asthma.  Mucus that runs down the back of your throat (postnasal drip).  Smoking.  Acid backing up from the stomach into the tube that moves food from the mouth to the stomach (gastroesophageal reflux).  Some medicines.  Lung problems.  Other medical conditions, such as heart failure or a blood clot in the lung (pulmonary embolism).  Follow these instructions at home:  Medicines  Take over-the-counter and prescription medicines only as told by your doctor.  Talk with your doctor before you take medicines that stop a cough (coughsuppressants).  Lifestyle    Do not smoke, and try not to be around smoke. Do not use any products that contain nicotine or tobacco, such as cigarettes, e-cigarettes, and chewing tobacco. If you need help quitting, ask your doctor.  Drink enough fluid to keep your pee (urine) pale yellow.  Avoid caffeine.  Do not drink alcohol if your doctor tells you not to drink.  General instructions    Watch for any changes in your cough. Tell your doctor about them.  Always cover your mouth when you cough.  Stay away from things that make you cough, such as perfume, candles, campfire smoke, or cleaning products.  If the air is dry, use a cool mist vaporizer or humidifier in your home.  If your cough is worse at night, try using extra pillows to raise your head up higher while you sleep.  Rest as needed.  Keep all follow-up visits as told by your doctor. This is important.  Contact a doctor if:  You have new symptoms.  You cough up pus.  Your cough does not get better after 2-3 weeks, or your cough gets worse.  Cough medicine does not help your cough and you are not sleeping well.  You have pain that gets worse or pain that is not helped with medicine.  You have a fever.  You are losing weight and you do not know why.  You have night sweats.  Get help right away if:  You cough up blood.  You have trouble breathing.  Your heartbeat is very fast.  These symptoms may be an emergency. Do  not wait to see if the symptoms will go away. Get medical help right away. Call your local emergency services (911 in the [place] Do not drive yourself to the hospital.  Summary  A cough helps to clear your throat and lungs. Many things can cause a cough.  Take over-the-counter and prescription medicines only as told by your doctor.  Always cover your mouth when you cough.  Contact a doctor if you have new symptoms or you have a cough that does not get better or gets worse.  This information is not intended to replace advice given to you by your health care provider. Make sure you discuss any questions you have with your health care provider.  Document Released: 08/30/2012 Document Revised: 01/06/2020 Document Reviewed: 01/06/2020  Elsevier Patient Education © 2020 [employer]

## 2022-11-26 NOTE — PROGRESS NOTES
Subjective:   Terri Alvares is a 42 y.o. female who presents for Cough, Fever, Body Aches, Lightheadedness, Fatigue, and Headache        Cough  Chronicity: Reports cough, subjective fever, he has body aches, lightheadedness, fatigue. The current episode started in the past 7 days (3 days). Associated symptoms include a fever and headaches. Pertinent negatives include no chills, myalgias, rash, sore throat or shortness of breath. Associated symptoms comments: There has been community-wide COVID-19 exposure, the patient denies known direct COVID-19 exposure    . She has tried rest for the symptoms. The treatment provided mild relief.   Fever   Associated symptoms include coughing and headaches. Pertinent negatives include no nausea, rash, sore throat or vomiting.   Fatigue  Associated symptoms include coughing, fatigue, a fever and headaches. Pertinent negatives include no chills, myalgias, nausea, rash, sore throat or vomiting.   Headache  PMH:  has a past medical history of GDM (gestational diabetes mellitus) (7/3/2013), Hepatitis C (2019), Migraine, and Psychiatric problem.  MEDS:   Current Outpatient Medications:     omeprazole (PRILOSEC) 20 MG delayed-release capsule, Take 20 mg by mouth every day., Disp: , Rfl:     loratadine (CLARITIN) 10 MG Tab, , Disp: , Rfl:     sumatriptan (IMITREX) 100 MG tablet, , Disp: , Rfl:   ALLERGIES: No Known Allergies  SURGHX:   Past Surgical History:   Procedure Laterality Date    ID LAP, ELANA RESTRICT PROC, LONGITUDINAL GAS* N/A 3/8/2021    Procedure: GASTRECTOMY, SLEEVE, LAPAROSCOPIC.;  Surgeon: John H Ganser, M.D.;  Location: SURGERY [facility];  Service: General    LAP, REMOVE ADJUST ELANA RESTRICT D*  8/28/2019    Procedure: REMOVAL, GASTRIC BAND, LAPAROSCOPIC - AND PORT;  Surgeon: John H Ganser, M.D.;  Location: SURGERY [facility];  Service: General    DILATION AND CURETTAGE  11/19/2018    Procedure: DILATION AND CURETTAGE;  Surgeon: Jatin Stapleton M.D.;   "Location: LABOR AND DELIVERY;  Service: Obstetrics    GASTRIC BANDING LAPAROSCOPIC  12/12    Lost 40#    EYE SURGERY  2011     SOCHX:  reports that she quit smoking about 9 years ago. Her smoking use included cigarettes. She has a 2.00 pack-year smoking history. She has never used smokeless tobacco. She reports current alcohol use. She reports that she does not use drugs.  FH:   Family History   Problem Relation Age of Onset    Diabetes Mother         dialysis    Diabetes Brother     Diabetes Maternal Aunt     Diabetes Maternal Grandmother     No Known Problems Brother      Review of Systems   Constitutional:  Positive for fatigue, fever and malaise/fatigue. Negative for chills.   HENT:  Negative for sore throat.    Respiratory:  Positive for cough. Negative for shortness of breath.    Gastrointestinal:  Negative for nausea and vomiting.   Musculoskeletal:  Negative for myalgias.   Skin:  Negative for rash.   Neurological:  Positive for headaches. Negative for dizziness.      Objective:   /72   Pulse 82   Temp 36.6 °C (97.9 °F) (Temporal)   Resp 16   Ht 1.651 m (5' 5\")   Wt 85 kg (187 lb 6.4 oz)   SpO2 97%   BMI 31.18 kg/m²   Physical Exam  Vitals and nursing note reviewed.   Constitutional:       General: She is not in acute distress.     Appearance: She is well-developed.   HENT:      Head: Normocephalic and atraumatic.      Right Ear: Tympanic membrane and external ear normal.      Left Ear: Tympanic membrane and external ear normal.      Nose: Rhinorrhea present.      Mouth/Throat:      Mouth: Mucous membranes are moist.      Pharynx: Posterior oropharyngeal erythema present. No oropharyngeal exudate.   Eyes:      Conjunctiva/sclera: Conjunctivae normal.   Cardiovascular:      Rate and Rhythm: Normal rate.   Pulmonary:      Effort: Pulmonary effort is normal. No respiratory distress.      Breath sounds: Normal breath sounds. No wheezing or rhonchi.   Abdominal:      General: There is no distension. "   Musculoskeletal:         General: Normal range of motion.   Skin:     General: Skin is warm and dry.   Neurological:      General: No focal deficit present.      Mental Status: She is alert and oriented to person, place, and time. Mental status is at baseline.      Gait: Gait (gait at baseline) normal.   Psychiatric:         Judgment: Judgment normal.         Assessment/Plan:   1. Suspected COVID-19 virus infection  - CoV-2 and Flu A/B by PCR (24 hour In-House): Collect NP swab in VTM; Future    2. Viral URI with cough    Other orders  - loratadine (CLARITIN) 10 MG Tab;  (Patient not taking: Reported on 11/26/2022)  - sumatriptan (IMITREX) 100 MG tablet;  (Patient not taking: Reported on 11/26/2022)    Advised routine [facility] social distancing guidelines, symptomatic and supportive measures      Medical Decision Making/Course:  In the course of preparing for this visit with review of the pertinent past medical history, recent and past clinic visits, current medications, and performing chart, immunization, medical history and medication reconciliation, and in the further course of obtaining the current history pertinent to the clinic visit today, performing an exam and evaluation, ordering and independently evaluating labs, tests  including SARS CoV-2 by PCR testing  , and/or procedures, prescribing any recommended new medications as noted above, providing any pertinent counseling and education and recommending further coordination of care, at least  8 minutes of total time were spent during this encounter.      Discussed close monitoring, return precautions, and supportive measures of maintaining adequate fluid hydration and caloric intake, relative rest and symptom management as needed for pain and/or fever.    Differential diagnosis, natural history, supportive care, and indications for immediate follow-up discussed.     Advised the patient to follow-up with the primary care physician for recheck, reevaluation, and  consideration of further management.    Please note that this dictation was created using voice recognition software. I have worked with consultants from the vendor as well as technical experts from [facility] to optimize the interface. I have made every reasonable attempt to correct obvious errors, but I expect that there are errors of grammar and possibly content that I did not discover before finalizing the note.

## 2022-11-27 LAB
FLUAV RNA SPEC QL NAA+PROBE: NEGATIVE
FLUBV RNA SPEC QL NAA+PROBE: NEGATIVE
SARS-COV-2 RNA RESP QL NAA+PROBE: NOTDETECTED
SPECIMEN SOURCE: NORMAL

## 2022-12-23 ENCOUNTER — APPOINTMENT (OUTPATIENT)
Dept: RADIOLOGY | Facility: MEDICAL CENTER | Age: 42
End: 2022-12-23
Attending: FAMILY MEDICINE
Payer: COMMERCIAL

## 2022-12-23 DIAGNOSIS — Z12.31 ENCOUNTER FOR MAMMOGRAM TO ESTABLISH BASELINE MAMMOGRAM: ICD-10-CM

## 2022-12-23 PROCEDURE — 77067 SCR MAMMO BI INCL CAD: CPT

## 2022-12-30 ENCOUNTER — TELEPHONE (OUTPATIENT)
Dept: HEALTH INFORMATION MANAGEMENT | Facility: OTHER | Age: 42
End: 2022-12-30
Payer: COMMERCIAL

## 2023-02-17 ENCOUNTER — APPOINTMENT (OUTPATIENT)
Dept: RADIOLOGY | Facility: MEDICAL CENTER | Age: 43
End: 2023-02-17
Attending: OBSTETRICS & GYNECOLOGY
Payer: COMMERCIAL

## 2023-06-19 ENCOUNTER — OFFICE VISIT (OUTPATIENT)
Dept: NEUROLOGY | Facility: MEDICAL CENTER | Age: 43
End: 2023-06-19
Attending: PSYCHIATRY & NEUROLOGY
Payer: COMMERCIAL

## 2023-06-19 VITALS
BODY MASS INDEX: 32.54 KG/M2 | RESPIRATION RATE: 14 BRPM | SYSTOLIC BLOOD PRESSURE: 110 MMHG | OXYGEN SATURATION: 98 % | WEIGHT: 195.33 LBS | HEART RATE: 67 BPM | HEIGHT: 65 IN | DIASTOLIC BLOOD PRESSURE: 78 MMHG

## 2023-06-19 DIAGNOSIS — G43.109 MIGRAINE WITH AURA AND WITHOUT STATUS MIGRAINOSUS, NOT INTRACTABLE: Primary | ICD-10-CM

## 2023-06-19 PROCEDURE — 99204 OFFICE O/P NEW MOD 45 MIN: CPT | Performed by: PSYCHIATRY & NEUROLOGY

## 2023-06-19 PROCEDURE — 3078F DIAST BP <80 MM HG: CPT | Performed by: PSYCHIATRY & NEUROLOGY

## 2023-06-19 PROCEDURE — 3074F SYST BP LT 130 MM HG: CPT | Performed by: PSYCHIATRY & NEUROLOGY

## 2023-06-19 PROCEDURE — 99212 OFFICE O/P EST SF 10 MIN: CPT | Performed by: PSYCHIATRY & NEUROLOGY

## 2023-06-19 RX ORDER — RIZATRIPTAN BENZOATE 10 MG/1
TABLET, ORALLY DISINTEGRATING ORAL
Qty: 9 TABLET | Refills: 11 | Status: SHIPPED | OUTPATIENT
Start: 2023-06-19

## 2023-06-19 RX ORDER — CHOLECALCIFEROL (VITAMIN D3) 125 MCG
CAPSULE ORAL
COMMUNITY
Start: 2023-05-05

## 2023-06-19 RX ORDER — OLOPATADINE HYDROCHLORIDE 1 MG/ML
SOLUTION/ DROPS OPHTHALMIC
COMMUNITY
Start: 2023-03-27

## 2023-06-19 RX ORDER — L. ACIDOPHILUS/PECTIN, CITRUS 25MM-100MG
TABLET ORAL
COMMUNITY
Start: 2023-05-05

## 2023-06-19 RX ORDER — IBUPROFEN 600 MG/1
TABLET ORAL
COMMUNITY
Start: 2023-05-17

## 2023-06-19 RX ORDER — PREDNISOLONE ACETATE 10 MG/ML
SUSPENSION/ DROPS OPHTHALMIC
COMMUNITY
Start: 2023-04-26

## 2023-06-19 RX ORDER — OMEPRAZOLE 40 MG/1
CAPSULE, DELAYED RELEASE ORAL
COMMUNITY
Start: 2023-05-05

## 2023-06-19 ASSESSMENT — PATIENT HEALTH QUESTIONNAIRE - PHQ9: CLINICAL INTERPRETATION OF PHQ2 SCORE: 0
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
## 2023-09-19 ENCOUNTER — APPOINTMENT (OUTPATIENT)
Dept: NEUROLOGY | Facility: MEDICAL CENTER | Age: 43
End: 2023-09-19
Attending: PSYCHIATRY & NEUROLOGY
Payer: COMMERCIAL

## 2024-01-03 ENCOUNTER — APPOINTMENT (OUTPATIENT)
Dept: NEUROLOGY | Facility: MEDICAL CENTER | Age: 44
End: 2024-01-03
Attending: PSYCHIATRY & NEUROLOGY
Payer: OTHER GOVERNMENT

## 2024-01-24 ENCOUNTER — APPOINTMENT (OUTPATIENT)
Dept: NEUROLOGY | Facility: MEDICAL CENTER | Age: 44
End: 2024-01-24
Attending: PSYCHIATRY & NEUROLOGY
Payer: COMMERCIAL

## 2024-09-03 ENCOUNTER — HOSPITAL ENCOUNTER (OUTPATIENT)
Facility: MEDICAL CENTER | Age: 44
End: 2024-09-03
Attending: FAMILY MEDICINE
Payer: COMMERCIAL

## 2024-09-03 ENCOUNTER — OFFICE VISIT (OUTPATIENT)
Dept: URGENT CARE | Facility: PHYSICIAN GROUP | Age: 44
End: 2024-09-03
Payer: COMMERCIAL

## 2024-09-03 VITALS
OXYGEN SATURATION: 98 % | BODY MASS INDEX: 33.66 KG/M2 | WEIGHT: 202 LBS | HEIGHT: 65 IN | SYSTOLIC BLOOD PRESSURE: 122 MMHG | DIASTOLIC BLOOD PRESSURE: 62 MMHG | TEMPERATURE: 98.8 F | RESPIRATION RATE: 16 BRPM | HEART RATE: 74 BPM

## 2024-09-03 DIAGNOSIS — M62.830 BACK MUSCLE SPASM: ICD-10-CM

## 2024-09-03 LAB
APPEARANCE UR: NORMAL
BILIRUB UR STRIP-MCNC: NORMAL MG/DL
COLOR UR AUTO: NORMAL
GLUCOSE UR STRIP.AUTO-MCNC: NEGATIVE MG/DL
KETONES UR STRIP.AUTO-MCNC: NORMAL MG/DL
LEUKOCYTE ESTERASE UR QL STRIP.AUTO: NEGATIVE
NITRITE UR QL STRIP.AUTO: NEGATIVE
PH UR STRIP.AUTO: 5.5 [PH] (ref 5–8)
POCT INT CON NEG: NEGATIVE
POCT INT CON POS: POSITIVE
POCT URINE PREGNANCY TEST: NEGATIVE
PROT UR QL STRIP: NEGATIVE MG/DL
RBC UR QL AUTO: NORMAL
SP GR UR STRIP.AUTO: 1.03
UROBILINOGEN UR STRIP-MCNC: 1 MG/DL

## 2024-09-03 PROCEDURE — 99214 OFFICE O/P EST MOD 30 MIN: CPT | Performed by: FAMILY MEDICINE

## 2024-09-03 PROCEDURE — 3074F SYST BP LT 130 MM HG: CPT | Performed by: FAMILY MEDICINE

## 2024-09-03 PROCEDURE — 87086 URINE CULTURE/COLONY COUNT: CPT

## 2024-09-03 PROCEDURE — 81025 URINE PREGNANCY TEST: CPT | Performed by: FAMILY MEDICINE

## 2024-09-03 PROCEDURE — 3078F DIAST BP <80 MM HG: CPT | Performed by: FAMILY MEDICINE

## 2024-09-03 PROCEDURE — 81002 URINALYSIS NONAUTO W/O SCOPE: CPT | Performed by: FAMILY MEDICINE

## 2024-09-03 RX ORDER — METHOCARBAMOL 750 MG/1
750 TABLET, FILM COATED ORAL 4 TIMES DAILY
COMMUNITY

## 2024-09-03 RX ORDER — METHYLPREDNISOLONE 4 MG
TABLET, DOSE PACK ORAL
Qty: 21 TABLET | Refills: 0 | Status: SHIPPED | OUTPATIENT
Start: 2024-09-03

## 2024-09-03 RX ORDER — SUMATRIPTAN 50 MG/1
100 TABLET, FILM COATED ORAL
COMMUNITY

## 2024-09-03 RX ORDER — CYCLOBENZAPRINE HCL 5 MG
5-10 TABLET ORAL EVERY 8 HOURS PRN
Qty: 30 TABLET | Refills: 0 | Status: SHIPPED | OUTPATIENT
Start: 2024-09-03

## 2024-09-03 NOTE — LETTER
September 3, 2024         Patient: Terri Alvares   YOB: 1980   Date of Visit: 9/3/2024           To Whom it May Concern:    Terri Alvares was seen in my clinic on 9/3/2024.     Please excuse absence due to illness on 9/3,9/4.    If you have any questions or concerns, please don't hesitate to call.        Sincerely,           Sohan Bush M.D.  Electronically Signed

## 2024-09-04 ENCOUNTER — HOSPITAL ENCOUNTER (OUTPATIENT)
Dept: RADIOLOGY | Facility: MEDICAL CENTER | Age: 44
End: 2024-09-04
Attending: FAMILY MEDICINE
Payer: COMMERCIAL

## 2024-09-04 DIAGNOSIS — M62.830 BACK MUSCLE SPASM: ICD-10-CM

## 2024-09-04 PROCEDURE — 74176 CT ABD & PELVIS W/O CONTRAST: CPT

## 2024-09-04 NOTE — PROGRESS NOTES
"    Back Pain  This is a new problem. The current episode started in the past 7 days. The problem occurs constantly. The problem is unchanged. The pain is present in the lumbar spine. The quality of the pain is described as aching. The pain does not radiate. The pain is moderate. The symptoms are aggravated by position. Pertinent negatives include no bladder incontinence, bowel incontinence, dysuria, fever, headaches, numbness or weakness. Treatments tried: motrin.  The treatment provided no relief.     Social History     Tobacco Use    Smoking status: Former     Current packs/day: 0.00     Average packs/day: 0.2 packs/day for 10.0 years (2.0 ttl pk-yrs)     Types: Cigarettes     Start date: 11/12/2003     Quit date: 11/12/2013     Years since quitting: 10.8    Smokeless tobacco: Never    Tobacco comments:     last used 3/13 due to pregnancy   Vaping Use    Vaping status: Never Used   Substance Use Topics    Alcohol use: Yes     Comment: 2 per year    Drug use: No       Family hx was reviewed - no pertinent past family hx      Past medical history was unremarkable and not pertinent to current issue    Review of Systems   Constitutional: Negative for fever.   Gastrointestinal: Negative for bowel incontinence.   Genitourinary: Negative for bladder incontinence, dysuria, urgency and frequency.   Musculoskeletal: Positive for back pain.   Neurological: Negative for weakness, numbness and headaches.   All other systems reviewed and are negative.         Objective:     /62   Pulse 74   Temp 37.1 °C (98.8 °F) (Temporal)   Resp 16   Ht 1.651 m (5' 5\")   Wt 91.6 kg (202 lb)   SpO2 98%     Physical Exam   Constitutional: pt is oriented to person, place, and time. Pt appears well-developed and well-nourished. No distress.   HENT:   Head: Normocephalic and atraumatic.   Eyes: EOM are normal. Pupils are equal, round, and reactive to light. No scleral icterus.   Neck: Normal range of motion. Neck supple. No " thyromegaly present.   Cardiovascular: Normal rate, regular rhythm and normal heart sounds.    Pulmonary/Chest: Effort normal and breath sounds normal. No respiratory distress.  no wheezes.   no rales.  no tenderness.   Musculoskeletal: pt exhibits no edema.        Right knee: Normal.        Left knee: Normal.               Lumbar back: pt exhibits decreased range of motion, spasm and tenderness . Pt exhibits no bony tenderness, no swelling, no edema, no deformity  .   Neurological: patient is alert and oriented to person, place, and time. Patient has normal reflexes. No cranial nerve deficit. Patient exhibits normal muscle tone.   Reflex Scores:       Patellar reflexes are 2+ on the right side and 2+ on the left side.  STRAIGHT LEG RAISE, TED NEGATIVE BILAT  Skin: Skin is warm and dry. No rash noted. No erythema.   Psychiatric: patient has a normal mood and affect.  behavior is normal.   Nursing note and vitals reviewed.       Lab Results   Component Value Date/Time    POCCOLOR Nae 09/03/2024 06:51 PM    POCCOLOR Yellow 03/29/2016 10:08 PM    POCAPPEAR slightly cloudy 09/03/2024 06:51 PM    POCAPPEAR Clear 03/29/2016 10:08 PM    POCLEUKEST negative 09/03/2024 06:51 PM    POCLEUKEST Trace (A) 03/29/2016 10:08 PM    POCNITRITE negative 09/03/2024 06:51 PM    POCNITRITE Negative 03/29/2016 10:08 PM    POCUROBILIGE 1.0 09/03/2024 06:51 PM    POCPROTEIN negative 09/03/2024 06:51 PM    POCPROTEIN 30 (A) 03/29/2016 10:08 PM    POCURPH 5.5 09/03/2024 06:51 PM    POCURPH 6.0 03/29/2016 10:08 PM    POCBLOOD moderate 09/03/2024 06:51 PM    POCBLOOD Small (A) 03/29/2016 10:08 PM    POCSPGRV 1.030 09/03/2024 06:51 PM    POCSPGRV 1.025 03/29/2016 10:08 PM    POCKETONES trace 09/03/2024 06:51 PM    POCKETONES Trace (A) 03/29/2016 10:08 PM    POCBILIRUBIN small 09/03/2024 06:51 PM    POCGLUCUA Negative 09/03/2024 06:51 PM    POCGLUCUA Negative 03/29/2016 10:08 PM        Details    Reading Physician Reading Date Result Priority    Marco Antonio Chowdhury M.D.  [phone] 9/4/2024      Narrative & Impression     9/4/2024 5:28 PM     HISTORY/REASON FOR EXAM:  Hematuria.        TECHNIQUE/EXAM DESCRIPTION AND NUMBER OF VIEWS:  CT scan renal/colic without contrast.     5 mm helical images of the abdomen and pelvis were obtained from the diaphragmatic domes through the pubic symphysis.     Low dose optimization technique was utilized for this CT exam including automated exposure control and adjustment of the mA and/or kV according to patient size.     COMPARISON: Multiphasic CT 2/22/2022     FINDINGS:  Chest Base: Lung bases are clear.     Liver:  Normal.     Gallbladder: Normal     Biliary tract: Nondilated.     Pancreas: Normal.     Spleen: Benign splenic cyst does not require imaging follow-up     Adrenals: Normal.     Kidneys and Collecting Systems:  Normal.     Gastrointestinal tract:  No bowel obstruction.   Gastric sleeve surgery. The appendix is normal.     Peritoneum: No free air or free fluid.     Reproductive organs:  A 4 x 3.3 cm left adnexal/ovarian cyst     Bladder:  Normal.     Vessels:  Normal caliber.     Lymph  Nodes:  No lymphadenopathy.     Abdominal wall: Within normal limits.     Bones:  No acute or aggressive abnormality.     IMPRESSION:        1.  No urolithiasis or hydronephrosis.  2.  4 cm left ovarian cyst.  3.  No acute inflammatory abnormality.                    Exam Ended: 09/04/24  5:41 PM Last Resulted: 09/04/24  5:49 PM           Assessment/Plan:       1. Back muscle spasm   UA positive for moderate blood.     Urine sent for cx    CT personally reviewed and shows no kidney stones.         - cyclobenzaprine (FLEXERIL) 5 mg tablet; Take 1-2 Tablets by mouth every 8 hours as needed for Muscle Spasms.  Dispense: 30 Tablet; Refill: 0  - methylPREDNISolone (MEDROL DOSEPAK) 4 MG Tablet Therapy Pack; Follow schedule on package instructions.  Dispense: 21 Tablet; Refill: 0  - diclofenac sodium (VOLTAREN) 1 % Gel; Apply 4 g  topically in the morning, at noon, and at bedtime.  Dispense: 50 g; Refill: 0        - POCT Urinalysis  - POCT Pregnancy  - URINE CULTURE(NEW); Future  - Referral to Physical Therapy       Differential diagnosis, natural history, supportive care, and indications for immediate follow-up discussed. All questions answered. Patient agrees with the plan of care.     Follow-up as needed if symptoms worsen or fail to improve to PCP, Urgent care or Emergency Room.     I have personally reviewed prior external notes and test results pertinent to today's visit.  I have independently reviewed and interpreted all diagnostics ordered during this urgent care acute visit.

## 2024-09-06 LAB
BACTERIA UR CULT: NORMAL
SIGNIFICANT IND 70042: NORMAL
SITE SITE: NORMAL
SOURCE SOURCE: NORMAL

## 2024-12-09 NOTE — PROGRESS NOTES
Pt here today for OB follow up  Pt states pt has yellow discharge with odor, no burning, leg cramping  Reports +FM  Good # [phone]  Pharmacy Confirmed.  Chaperone offered and none needed.   GBS Today    
SUBJECTIVE:  Pt is a 38 y.o.   at 36w2d  gestation. Presents today for follow-up prenatal care. Reports no issues at this time.  Reports good fetal movement. Denies cramping/contractions, bleeding or leaking of fluid. Denies dysuria, headaches, N/V, or other issues at this time. Generally feels well today. Reports no issues with moods. No HSV outbreaks. Reports yellowish fishy d/c since last visit with us. No burning with urination.     OBJECTIVE:  - See prenatal vitals flow  -   Vitals:    10/19/18 1055   BP: 102/76   Weight: 104.8 kg (231 lb)                 ASSESSMENT:   - IUP at 36w2d    - S=D   - +Clue cells, no trich, no budding yeast   - Urine dip trace blood only  Patient Active Problem List    Diagnosis Date Noted   • UTI in pregnancy, antepartum, third trimester 2018   • Abnormal AFP3 test - 1:2 risk Trisomy 18, referral placed to Dr Oron[place].  2018   • Rubella non-immune  2018   • HSV infection 2018   • Supervision of high risk elderly multigravida in third trimester 2013   • Advanced maternal age in multigravida 2013   • History of laparoscopic adjustable gastric banding 2013   • History of depression 2013   • History of suicide attempt 2013         PLAN:  - S/sx pregnancy and labor warning signs vs general discomforts discussed  - Fetal movements and kick counts reviewed   - Adequate hydration reinforced  - Nutrition/exercise/vitamin education; continued PNV  - S/p TDAP vacc today  - S/p Flu vacc today  - Anticipatory guidance given  - BV treatment  - HSV prophylaxis TID starting now; to report any outbreaks   - RTC in 1 weeks for follow-up prenatal care    
headache dizziness and nausea

## (undated) DEVICE — HEAD HOLDER JUNIOR/ADULT

## (undated) DEVICE — CATHETER IV NON-SAFETY 18 GA X 1 1/4 (50/BX 4BX/CA)

## (undated) DEVICE — SUTURE GENERAL

## (undated) DEVICE — NEPTUNE 4 PORT MANIFOLD - (20/PK)

## (undated) DEVICE — DRESSING TRANSPARENT FILM TEGADERM 2.375 X 2.75"  (100EA/BX)"

## (undated) DEVICE — VACURETTE 14MM CURVED  1/2IN BASE (10EA/PK)

## (undated) DEVICE — BANDAID SHEER STRIP 3/4 IN (100EA/BX 12BX/CA)

## (undated) DEVICE — SODIUM CHL IRRIGATION 0.9% 1000ML (12EA/CA)

## (undated) DEVICE — TROCAR SEPARATOR 15MMZTHREAD - (6/BX)

## (undated) DEVICE — CANISTER SUCTION 3000ML MECHANICAL FILTER AUTO SHUTOFF MEDI-VAC NONSTERILE LF DISP  (40EA/CA)

## (undated) DEVICE — TUBING FILTER STRYKER

## (undated) DEVICE — GLOVE, LITE (PAIR)

## (undated) DEVICE — TRAP TISSUE SAFETOUCH

## (undated) DEVICE — TRAY SRGPRP PVP IOD WT PRP - (20/CA)

## (undated) DEVICE — CUFF BP ADULT MEDIUM DISPOSABLE (20EA/CA)

## (undated) DEVICE — ELECTRODE DUAL RETURN W/ CORD - (50/PK)

## (undated) DEVICE — SPONGE GAUZESTER. 2X2 4-PL - (2/PK 50PK/BX 30BX/CS)

## (undated) DEVICE — LACTATED RINGERS INJ 1000 ML - (14EA/CA 60CA/PF)

## (undated) DEVICE — GLOVE BIOGEL PI INDICATOR SZ 8.0 SURGICAL PF LF -(50/BX 4BX/CA)

## (undated) DEVICE — TUBE CONNECTING SUCTION - CLEAR PLASTIC STERILE 72 IN (50EA/CA)

## (undated) DEVICE — CHLORAPREP 26 ML APPLICATOR - ORANGE TINT(25/CA)

## (undated) DEVICE — KIT  I.V. START (100EA/CA)

## (undated) DEVICE — WATER IRRIG. STER. 1500 ML - (9/CA)

## (undated) DEVICE — TUBING CLEARLINK DUO-VENT - C-FLO (48EA/CA)

## (undated) DEVICE — VACURETTE 8MM CURVED 10/PKG

## (undated) DEVICE — BOTTLE SECONDARY SAFE TOUCH - W/SEAL CUP(10/BX)

## (undated) DEVICE — VACURETTE 10MM CURVED 10/PKG

## (undated) DEVICE — SYRINGE SAFETY 5 ML 18 GA X 1-1/2 BLUNT LL (100/BX 4BX/CA)

## (undated) DEVICE — STYLETTE 6FR INFANT STERILE SINGEL ALUMINUM SUNSLIP SEALED IN PVC SHEATH (20EA/BX)

## (undated) DEVICE — SYRINGE ASEPTO - (50EA/CA

## (undated) DEVICE — CURRETTE TIP VAC CURV 16MM (10EA/BX)

## (undated) DEVICE — CANNULA W/SEAL 5X100 Z-THRE - ADED KII (12/BX)

## (undated) DEVICE — DETERGENT RENUZYME PLUS 10 OZ PACKET (50/BX)

## (undated) DEVICE — NEEDLE INSFL 120MM 14GA VRRS - (20/BX)

## (undated) DEVICE — TROCAR 5X100 NON BLADED Z-TH - READ KII (6/BX)

## (undated) DEVICE — Device

## (undated) DEVICE — SUTURE 4-0 VICRYL PLUS FS-2 - 27 INCH (36/BX)

## (undated) DEVICE — SLEEVE, VASO, THIGH, MED

## (undated) DEVICE — MASK ANESTHESIA ADULT  - (100/CA)

## (undated) DEVICE — TUBING INSUFFLATION - (10/BX)

## (undated) DEVICE — GLOVE BIOGEL INDICATOR SZ 8 SURGICAL PF LTX - (50/BX 4BX/CA)

## (undated) DEVICE — TUBING SCT 18IN BR SFTCH BTL - 10/BX GOES WITH 77350

## (undated) DEVICE — GLOVE SZ 6.5 BIOGEL PI MICRO - PF LF (50PR/BX)

## (undated) DEVICE — SUCTION INSTRUMENT YANKAUER BULBOUS TIP W/O VENT (50EA/CA)

## (undated) DEVICE — SEALER VESSEL HARMONIC ACE PLUS WITH ADVANCED HEMOSTASIS 36CM (1/EA)

## (undated) DEVICE — VACURETTE 12MM CURVED 10/PKG

## (undated) DEVICE — GLOVE BIOGEL SZ 8 SURGICAL PF LTX - (50PR/BX 4BX/CA)

## (undated) DEVICE — TUBING D & E COLLECTION SET (50EA/PK)

## (undated) DEVICE — BOTTLE COLLECTION CANISTER LG - (10/PK)

## (undated) DEVICE — GOWN WARMING X-LARGE FLEX - (20/CA)

## (undated) DEVICE — SYRINGE SAFETY TB 1 ML 27 GA X 1/2 IN (100/BX 4BX/CA)

## (undated) DEVICE — TROCAR Z THREAD12MM OPTICAL - NON BLADED (6/BX)

## (undated) DEVICE — FILTER ASPIRATION SAFE TOUCH

## (undated) DEVICE — SET TUBING PNEUMOCLEAR HIGH FLOW SMOKE EVACUATION (10EA/BX)

## (undated) DEVICE — GLOVE BIOGEL PI INDICATOR SZ 6.5 SURGICAL PF LF - (50/BX 4BX/CA)

## (undated) DEVICE — RELOAD WITH GRIPPING SURGACE TECHNOLOGY GREEN 60MM (12EA/BX)

## (undated) DEVICE — ENDOSTITCH10MM SUTURING DEVIC - (3/CA)

## (undated) DEVICE — TUBE E-T HI-LO CUFF 7.0MM (10EA/PK)

## (undated) DEVICE — SET LEADWIRE 5 LEAD BEDSIDE DISPOSABLE ECG (1SET OF 5/EA)

## (undated) DEVICE — GLOVE BIOGEL M SZ 8 SURGICAL PF LTX - (50/BX 4BX/CA)

## (undated) DEVICE — KIT ROOM DECONTAMINATION

## (undated) DEVICE — MAT PATIENT POSITIONING PREVALON (10EA/CA)

## (undated) DEVICE — SUTURE 3-0 ENDO STITCH ABSORBALE 8 20CM (6EA/CA)"

## (undated) DEVICE — KIT ANESTHESIA W/CIRCUIT & 3/LT BAG W/FILTER (20EA/CA)

## (undated) DEVICE — HUMID-VENT HEAT AND MOISTURE EXCHANGE- (50/BX)

## (undated) DEVICE — SENSOR SPO2 NEO LNCS ADHESIVE (20/BX) SEE USER NOTES

## (undated) DEVICE — GLOVE BIOGEL SZ 7.5 SURGICAL PF LTX - (50PR/BX 4BX/CA)

## (undated) DEVICE — PACK ROOM TURNOVER L&D (12/CA)

## (undated) DEVICE — ELECTRODE 850 FOAM ADHESIVE - HYDROGEL RADIOTRNSPRNT (50/PK)

## (undated) DEVICE — SET SUCTION/IRRIGATION WITH DISPOSABLE TIP (6/CA )PART #0250-070-520 IS A SUB

## (undated) DEVICE — SET EXTENSION WITH 2 PORTS (48EA/CA) ***PART #2C8610 IS A SUBSTITUTE*****

## (undated) DEVICE — MANIFOLD NEPTUNE 1 PORT (20/PK)

## (undated) DEVICE — VACURETTE 'F' TIP 8MM 10/PKG

## (undated) DEVICE — GVL 3 STAT DISPOSABLE - (10/BX)

## (undated) DEVICE — BANDAID X-LARGE 2 X 4 IN LF (50EA/BX)

## (undated) DEVICE — TUBING LAPAROSCOPIC PLUME DEVICE (10EA/CA)

## (undated) DEVICE — RELOAD WITH GRIPPING SURFACE TECHNOLOGY GOLD 60MM (12EA/BX)

## (undated) DEVICE — STAPLER POWERED 60MM (3EA/BX)

## (undated) DEVICE — PACK GASTRIC BANDING OR - (1/CA)

## (undated) DEVICE — PROTECTOR ULNA NERVE - (36PR/CA)